# Patient Record
Sex: FEMALE | Race: BLACK OR AFRICAN AMERICAN | NOT HISPANIC OR LATINO | Employment: FULL TIME | URBAN - METROPOLITAN AREA
[De-identification: names, ages, dates, MRNs, and addresses within clinical notes are randomized per-mention and may not be internally consistent; named-entity substitution may affect disease eponyms.]

---

## 2017-04-28 ENCOUNTER — CONVERSION ENCOUNTER (OUTPATIENT)
Dept: MAMMOGRAPHY | Facility: CLINIC | Age: 47
End: 2017-04-28

## 2017-05-14 ENCOUNTER — OFFICE VISIT (OUTPATIENT)
Dept: URGENT CARE | Facility: MEDICAL CENTER | Age: 47
End: 2017-05-14
Payer: COMMERCIAL

## 2017-05-14 PROCEDURE — 99283 EMERGENCY DEPT VISIT LOW MDM: CPT

## 2017-05-14 PROCEDURE — G0382 LEV 3 HOSP TYPE B ED VISIT: HCPCS

## 2017-08-05 ENCOUNTER — HOSPITAL ENCOUNTER (EMERGENCY)
Facility: HOSPITAL | Age: 47
Discharge: HOME | End: 2017-08-06
Attending: EMERGENCY MEDICINE
Payer: COMMERCIAL

## 2017-08-05 VITALS
HEART RATE: 49 BPM | TEMPERATURE: 97.6 F | OXYGEN SATURATION: 100 % | SYSTOLIC BLOOD PRESSURE: 91 MMHG | DIASTOLIC BLOOD PRESSURE: 57 MMHG | RESPIRATION RATE: 16 BRPM | WEIGHT: 147.49 LBS

## 2017-08-05 DIAGNOSIS — S52.90XA RADIUS FRACTURE: Primary | ICD-10-CM

## 2017-08-05 RX ORDER — IBUPROFEN 400 MG/1
400 TABLET ORAL ONCE
Status: COMPLETED | OUTPATIENT
Start: 2017-08-06 | End: 2017-08-06

## 2017-08-05 RX ORDER — ONDANSETRON 2 MG/ML
INJECTION INTRAMUSCULAR; INTRAVENOUS
Status: COMPLETED
Start: 2017-08-05 | End: 2017-08-06

## 2017-08-06 ENCOUNTER — APPOINTMENT (EMERGENCY)
Dept: RADIOLOGY | Facility: HOSPITAL | Age: 47
End: 2017-08-06
Payer: COMMERCIAL

## 2017-08-06 PROCEDURE — 73110 X-RAY EXAM OF WRIST: CPT

## 2017-08-06 PROCEDURE — 96372 THER/PROPH/DIAG INJ SC/IM: CPT

## 2017-08-06 PROCEDURE — 99284 EMERGENCY DEPT VISIT MOD MDM: CPT

## 2017-08-06 RX ORDER — OXYCODONE HYDROCHLORIDE AND ACETAMINOPHEN 5; 325 MG/1; MG/1
1 TABLET ORAL EVERY 6 HOURS PRN
Qty: 12 TABLET | Refills: 0 | Status: SHIPPED | OUTPATIENT
Start: 2017-08-06 | End: 2017-08-16

## 2017-08-06 RX ORDER — IBUPROFEN 600 MG/1
600 TABLET ORAL EVERY 6 HOURS PRN
Qty: 30 TABLET | Refills: 0 | Status: SHIPPED | OUTPATIENT
Start: 2017-08-06 | End: 2019-01-30

## 2017-08-06 RX ADMIN — HYDROMORPHONE HYDROCHLORIDE 1 MG: 1 INJECTION, SOLUTION INTRAMUSCULAR; INTRAVENOUS; SUBCUTANEOUS at 00:32

## 2017-08-06 RX ADMIN — IBUPROFEN 400 MG: 400 TABLET ORAL at 00:15

## 2018-03-25 ENCOUNTER — OFFICE VISIT (OUTPATIENT)
Dept: URGENT CARE | Facility: MEDICAL CENTER | Age: 48
End: 2018-03-25
Payer: COMMERCIAL

## 2018-03-25 VITALS
OXYGEN SATURATION: 100 % | DIASTOLIC BLOOD PRESSURE: 70 MMHG | SYSTOLIC BLOOD PRESSURE: 130 MMHG | TEMPERATURE: 98.5 F | HEART RATE: 48 BPM | RESPIRATION RATE: 16 BRPM

## 2018-03-25 DIAGNOSIS — J30.1 ACUTE SEASONAL ALLERGIC RHINITIS DUE TO POLLEN: Primary | ICD-10-CM

## 2018-03-25 PROCEDURE — 99282 EMERGENCY DEPT VISIT SF MDM: CPT | Performed by: PHYSICIAN ASSISTANT

## 2018-03-25 PROCEDURE — G0381 LEV 2 HOSP TYPE B ED VISIT: HCPCS | Performed by: PHYSICIAN ASSISTANT

## 2018-03-25 RX ORDER — PREDNISONE 10 MG/1
TABLET ORAL
Qty: 18 TABLET | Refills: 0 | Status: SHIPPED | OUTPATIENT
Start: 2018-03-25 | End: 2019-01-30 | Stop reason: ALTCHOICE

## 2018-03-25 NOTE — PATIENT INSTRUCTIONS
Allergic Rhinitis   WHAT YOU NEED TO KNOW:   Allergic rhinitis, or hay fever, is swelling of the inside of your nose  The swelling is a reaction to allergens in the air  An allergen can be anything that causes an allergic reaction  Allergies to weeds, grass, trees, or mold often cause seasonal allergic rhinitis  Indoor dust mites, cockroaches, pet dander, or mold can also cause allergic rhinitis  DISCHARGE INSTRUCTIONS:   Call 911 for the following:   · You have chest pain or shortness of breath  Return to the emergency department if:   · You have severe pain  · You cough up blood  Contact your healthcare provider if:   · You have a fever  · You have ear or sinus pain, or a headache  · Your symptoms get worse, even after treatment  · You have yellow, green, brown, or bloody mucus coming from your nose  · Your nose is bleeding or you have pain inside your nose  · You have trouble sleeping because of your symptoms  · You have questions or concerns about your condition or care  Medicines:   · Medicines  help decrease your symptoms and clear your stuffy nose  · Take your medicine as directed  Contact your healthcare provider if you think your medicine is not helping or if you have side effects  Tell him of her if you are allergic to any medicine  Keep a list of the medicines, vitamins, and herbs you take  Include the amounts, and when and why you take them  Bring the list or the pill bottles to follow-up visits  Carry your medicine list with you in case of an emergency  How to manage allergic rhinitis:  The best way to manage allergic rhinitis is to avoid allergens that can trigger your symptoms  Any of the following may help decrease your symptoms:  · Rinse your nose and sinuses  with a salt water solution or use a salt water nasal spray  This will help thin the mucus in your nose and rinse away pollen and dirt  It will also help reduce swelling so you can breathe normally  Ask your healthcare provider how often to rinse your nose  · Reduce exposure to dust mites  Wash sheets and towels in hot water every week  Cover your pillows and mattresses with allergen-free covers  Limit the number of stuffed animals and soft toys your child has  Wash your child's toys in hot water regularly  Vacuum weekly and use a vacuum  with an air filter  If possible, get rid of carpets and curtains  These collect dust and dust mites  · Reduce exposure to pollen  Keep windows and doors closed in your house and car  Stay inside when air pollution or the pollen count is high  Run your air conditioner on recycle, and change air filters often  Shower and wash your hair before bed every night to rinse away pollen  · Reduce exposure to pet dander  If possible, do not keep cats, dogs, birds, or other pets  If you do keep pets in your home, keep them out of bedrooms and carpeted rooms  Bathe them often  · Reduce exposure to mold  Do not spend time in basements  Choose artificial plants instead of live plants  Keep your home's humidity at less than 45%  Do not have ponds or standing water in your home or yard  · Do not smoke  Avoid others who smoke  Ask your healthcare provider for information if you currently smoke and need help to quit  Follow up with your healthcare provider as directed: You may need to see an allergist often to control your symptoms  Write down your questions so you remember to ask them during your visits  © 2017 [employer] [employer] Information is for End User's use only and may not be sold, redistributed or otherwise used for commercial purposes  All illustrations and images included in CareNotes® are the copyrighted property of [employer]  or Luis F Navarro  The above information is an  only  It is not intended as medical advice for individual conditions or treatments   Talk to your doctor, nurse or pharmacist before following any medical regimen to see if it is safe and effective for you

## 2018-03-25 NOTE — PROGRESS NOTES
[facility]        NAME: Rsoe Toussaint is a 52 y o  female  : 1970    MRN: [id number]  DATE: 2018  TIME: 5:37 PM    Assessment and Plan   Acute seasonal allergic rhinitis due to pollen [J30 1]  1  Acute seasonal allergic rhinitis due to pollen  predniSONE 10 mg tablet         Patient Instructions       Follow-up if symptoms increase or no better in 5 days with PCP  Claritin or Allegra daily  Chief Complaint     Chief Complaint   Patient presents with    Headache     Pt with complaints of runny nose, phlegm in throat for months  Past 3 weeks has headache across forehead and behind eyes that pt states never totally subsides despite taking ibuprofen  Pt denies light sensitivity or nausea or vomiting   Nasal Congestion         History of Present Illness       Sinusitis   This is a new problem  The current episode started 1 to 4 weeks ago  The problem has been gradually worsening since onset  There has been no fever  Her pain is at a severity of 8/10  Associated symptoms include congestion, coughing, headaches, sinus pressure and a sore throat  Pertinent negatives include no chills, diaphoresis, ear pain, hoarse voice, neck pain, shortness of breath, sneezing or swollen glands  Past treatments include nothing  Patient with history of increasing sinus pressure  Has no fever or chills, but continuing difficulties  Symptoms increase when she lays down  She has a headache that is frontal in nature and behind her eyes and is pressing and pressure type  Review of Systems   Review of Systems   Constitutional: Negative for chills and diaphoresis  HENT: Positive for congestion, sinus pressure and sore throat  Negative for ear pain, hoarse voice and sneezing  Respiratory: Positive for cough  Negative for shortness of breath  Musculoskeletal: Negative for neck pain  Neurological: Positive for headaches  All other systems reviewed and are negative          Current Medications Current Outpatient Prescriptions:     ibuprofen (MOTRIN) 600 mg tablet, Take 1 tablet by mouth every 6 (six) hours as needed for mild pain or moderate pain, Disp: 30 tablet, Rfl: 0    predniSONE 10 mg tablet, 4 x 3 days, 3 x 1, 2×1, 1×1, Disp: 18 tablet, Rfl: 0    Current Allergies     Allergies as of 03/25/2018    (No Known Allergies)            The following portions of the patient's history were reviewed and updated as appropriate: allergies, current medications, past family history, past medical history, past social history, past surgical history and problem list      Past Medical History:   Diagnosis Date    Bradycardia     Hypotension        No past surgical history on file  No family history on file  Medications have been verified  Objective   /70 (BP Location: Right arm, Patient Position: Sitting, Cuff Size: Standard)   Pulse (!) 48   Temp 98 5 °F (36 9 °C) (Tympanic)   Resp 16   SpO2 100%        Physical Exam     Physical Exam   Constitutional: She appears well-developed and well-nourished  HENT:   Head: Normocephalic and atraumatic  Right Ear: External ear normal    Left Ear: External ear normal    Mouth/Throat: Oropharynx is clear and moist  No oropharyngeal exudate  Eyes: Conjunctivae are normal    Cardiovascular: Normal rate, regular rhythm and normal heart sounds  Pulmonary/Chest: Effort normal and breath sounds normal    Nasal mucosa boggy with cobblestone appearance  Negative maxillary tenderness And good transillumination  Tms slightly bulging bilaterally

## 2018-07-16 ENCOUNTER — TRANSCRIBE ORDERS (OUTPATIENT)
Dept: ADMINISTRATIVE | Facility: HOSPITAL | Age: 48
End: 2018-07-16

## 2018-07-16 DIAGNOSIS — F33.2 SEVERE RECURRENT MAJOR DEPRESSION WITHOUT PSYCHOTIC FEATURES (HCC): ICD-10-CM

## 2018-07-16 DIAGNOSIS — F31.70 BIPOLAR AFFECTIVE DISORDER IN REMISSION (HCC): ICD-10-CM

## 2018-07-17 ENCOUNTER — APPOINTMENT (OUTPATIENT)
Dept: LAB | Facility: HOSPITAL | Age: 48
End: 2018-07-17
Payer: COMMERCIAL

## 2018-07-17 DIAGNOSIS — F31.70 BIPOLAR AFFECTIVE DISORDER IN REMISSION (HCC): ICD-10-CM

## 2018-07-17 DIAGNOSIS — F33.2 SEVERE RECURRENT MAJOR DEPRESSION WITHOUT PSYCHOTIC FEATURES (HCC): ICD-10-CM

## 2018-07-17 DIAGNOSIS — F20.0 PARANOID SCHIZOPHRENIA, SUBCHRONIC CONDITION (HCC): ICD-10-CM

## 2018-07-17 DIAGNOSIS — F25.9 SCHIZOAFFECTIVE DISORDER, UNSPECIFIED TYPE (HCC): ICD-10-CM

## 2018-07-17 LAB
ALBUMIN SERPL BCP-MCNC: 3.6 G/DL (ref 3–5.2)
ALP SERPL-CCNC: 61 U/L (ref 43–122)
ALT SERPL W P-5'-P-CCNC: 22 U/L (ref 9–52)
ANION GAP SERPL CALCULATED.3IONS-SCNC: 7 MMOL/L (ref 5–14)
AST SERPL W P-5'-P-CCNC: 20 U/L (ref 14–36)
BASOPHILS # BLD AUTO: 0 THOUSANDS/ΜL (ref 0–0.1)
BASOPHILS NFR BLD AUTO: 1 % (ref 0–1)
BILIRUB SERPL-MCNC: 0.2 MG/DL
BUN SERPL-MCNC: 16 MG/DL (ref 5–25)
CALCIUM SERPL-MCNC: 8.6 MG/DL (ref 8.4–10.2)
CHLORIDE SERPL-SCNC: 105 MMOL/L (ref 97–108)
CHOLEST SERPL-MCNC: 202 MG/DL
CO2 SERPL-SCNC: 27 MMOL/L (ref 22–30)
CREAT SERPL-MCNC: 0.7 MG/DL (ref 0.6–1.2)
EOSINOPHIL # BLD AUTO: 0.1 THOUSAND/ΜL (ref 0–0.4)
EOSINOPHIL NFR BLD AUTO: 3 % (ref 0–6)
ERYTHROCYTE [DISTWIDTH] IN BLOOD BY AUTOMATED COUNT: 12.3 %
GFR SERPL CREATININE-BSD FRML MDRD: 119 ML/MIN/1.73SQ M
GLUCOSE P FAST SERPL-MCNC: 87 MG/DL (ref 70–99)
HCT VFR BLD AUTO: 36.7 % (ref 36–46)
HDLC SERPL-MCNC: 57 MG/DL (ref 40–59)
HGB BLD-MCNC: 12 G/DL (ref 12–16)
LDLC SERPL CALC-MCNC: 129 MG/DL
LYMPHOCYTES # BLD AUTO: 1.6 THOUSANDS/ΜL (ref 0.5–4)
LYMPHOCYTES NFR BLD AUTO: 41 % (ref 20–50)
MCH RBC QN AUTO: 30.2 PG (ref 26–34)
MCHC RBC AUTO-ENTMCNC: 32.7 G/DL (ref 31–36)
MCV RBC AUTO: 92 FL (ref 80–100)
MONOCYTES # BLD AUTO: 0.4 THOUSAND/ΜL (ref 0.2–0.9)
MONOCYTES NFR BLD AUTO: 10 % (ref 1–10)
NEUTROPHILS # BLD AUTO: 1.7 THOUSANDS/ΜL (ref 1.8–7.8)
NEUTS SEG NFR BLD AUTO: 45 % (ref 45–65)
NONHDLC SERPL-MCNC: 145 MG/DL
PLATELET # BLD AUTO: 168 THOUSANDS/UL (ref 150–450)
PMV BLD AUTO: 8.9 FL (ref 8.9–12.7)
POTASSIUM SERPL-SCNC: 4.2 MMOL/L (ref 3.6–5)
PROT SERPL-MCNC: 7.1 G/DL (ref 5.9–8.4)
RBC # BLD AUTO: 3.98 MILLION/UL (ref 4–5.2)
SODIUM SERPL-SCNC: 139 MMOL/L (ref 137–147)
TRIGL SERPL-MCNC: 78 MG/DL
TSH SERPL DL<=0.05 MIU/L-ACNC: 2.36 UIU/ML (ref 0.47–4.68)
WBC # BLD AUTO: 3.8 THOUSAND/UL (ref 4.5–11)

## 2018-07-17 PROCEDURE — 84443 ASSAY THYROID STIM HORMONE: CPT

## 2018-07-17 PROCEDURE — 80053 COMPREHEN METABOLIC PANEL: CPT

## 2018-07-17 PROCEDURE — 80061 LIPID PANEL: CPT

## 2018-07-17 PROCEDURE — 36415 COLL VENOUS BLD VENIPUNCTURE: CPT

## 2018-07-17 PROCEDURE — 85025 COMPLETE CBC W/AUTO DIFF WBC: CPT

## 2018-12-26 ENCOUNTER — HOSPITAL ENCOUNTER (EMERGENCY)
Facility: HOSPITAL | Age: 48
Discharge: HOME | End: 2018-12-26
Attending: EMERGENCY MEDICINE
Payer: COMMERCIAL

## 2018-12-26 ENCOUNTER — APPOINTMENT (EMERGENCY)
Dept: RADIOLOGY | Facility: HOSPITAL | Age: 48
End: 2018-12-26
Payer: COMMERCIAL

## 2018-12-26 VITALS
RESPIRATION RATE: 16 BRPM | TEMPERATURE: 98.3 F | OXYGEN SATURATION: 100 % | BODY MASS INDEX: 25.4 KG/M2 | DIASTOLIC BLOOD PRESSURE: 85 MMHG | HEART RATE: 85 BPM | SYSTOLIC BLOOD PRESSURE: 153 MMHG | WEIGHT: 138.89 LBS

## 2018-12-26 DIAGNOSIS — J18.9 PNEUMONIA: Primary | ICD-10-CM

## 2018-12-26 LAB
FLUAV + FLUBV RNA ISLT NAA+PROBE: NOT DETECTED
FLUAV + FLUBV RNA ISLT NAA+PROBE: NOT DETECTED

## 2018-12-26 PROCEDURE — 71046 X-RAY EXAM CHEST 2 VIEWS: CPT

## 2018-12-26 PROCEDURE — 87502 INFLUENZA DNA AMP PROBE: CPT | Performed by: PHYSICIAN ASSISTANT

## 2018-12-26 PROCEDURE — 99283 EMERGENCY DEPT VISIT LOW MDM: CPT

## 2018-12-26 RX ORDER — AZITHROMYCIN 250 MG/1
TABLET, FILM COATED ORAL
Qty: 6 TABLET | Refills: 0 | Status: SHIPPED | OUTPATIENT
Start: 2018-12-26 | End: 2018-12-26

## 2018-12-26 RX ORDER — AZITHROMYCIN 250 MG/1
TABLET, FILM COATED ORAL
Qty: 6 TABLET | Refills: 0 | Status: SHIPPED | OUTPATIENT
Start: 2018-12-26 | End: 2018-12-30

## 2018-12-26 RX ORDER — DEXTROMETHORPHAN HYDROBROMIDE AND PROMETHAZINE HYDROCHLORIDE 15; 6.25 MG/5ML; MG/5ML
5 SYRUP ORAL 4 TIMES DAILY PRN
Qty: 118 ML | Refills: 0 | Status: SHIPPED | OUTPATIENT
Start: 2018-12-26 | End: 2019-01-02

## 2018-12-26 NOTE — ED PROVIDER NOTES
History  Chief Complaint   Patient presents with    Fever - 9 weeks to 74 years     temp anywhere from 100 to 102  7  body aches, cough, sore throat  pt recently put on amoxicillin about 3 weeks ago which did help, but pt began to feel sick again     This is a 49-year-old female patient who states she has been sick for about 3 weeks she was seen in urgent care more than a week ago was put on amoxicillin she still having severe sinus congestion with green discharge from her nose and frontal pressure  Without headache or blurred vision she has an ongoing cough the last with a day and the night and as of 2 days ago developed 102 fever and some aches and pains with some chills  She finished the antibiotics as directed and did not improve at all  The beginning of this she was put on steroids in nasal spray without improvement  No nausea vomiting or diarrhea no chest pain or shortness of breath no urinary symptoms  She is eating and drinking but just feels very washout she called her family doctor which she cannot see her till January  She is still not taking any medications  Differential diagnosis includes not limited to sinusitis due to the greenish nasal discharge and phlegm terminates upon exam this is a not improved after amoxicillin patient might be given Augmentin, influenza, pneumonia            Prior to Admission Medications   Prescriptions Last Dose Informant Patient Reported?  Taking?   ibuprofen (MOTRIN) 600 mg tablet Past Week at Unknown time  No Yes   Sig: Take 1 tablet by mouth every 6 (six) hours as needed for mild pain or moderate pain   predniSONE 10 mg tablet Not Taking at Unknown time  No No   Sig: 4 x 3 days, 3 x 1, 2×1, 1×1   Patient not taking: Reported on 12/26/2018       Facility-Administered Medications: None       Past Medical History:   Diagnosis Date    Bradycardia     Depression     Hypotension        Past Surgical History:   Procedure Laterality Date    HEMORROIDECTOMY      WRIST SURGERY Right        Family History   Problem Relation Age of Onset    Hypertension Maternal Grandmother     Diabetes Paternal Grandmother      I have reviewed and agree with the history as documented  Social History   Substance Use Topics    Smoking status: Never Smoker    Smokeless tobacco: Never Used    Alcohol use 0 6 oz/week     1 Glasses of wine per week      Comment: social        Review of Systems   All other systems reviewed and are negative  Physical Exam  Physical Exam   Constitutional: She appears well-developed and well-nourished  HENT:   Head: Normocephalic and atraumatic  Right Ear: External ear normal    Left Ear: External ear normal    Nose: Mucosal edema and rhinorrhea present  Right sinus exhibits maxillary sinus tenderness  Left sinus exhibits maxillary sinus tenderness  Mouth/Throat: Oropharynx is clear and moist    Patient with greenish-yellow nasal discharge  Positive postnasal drip   Eyes: Pupils are equal, round, and reactive to light  Conjunctivae are normal    Neck: Normal range of motion  Neck supple  Cardiovascular: Normal rate and regular rhythm  Pulmonary/Chest: Effort normal and breath sounds normal    Abdominal: Soft  Bowel sounds are normal  There is no tenderness  Musculoskeletal: Normal range of motion  She exhibits no edema or tenderness  Lymphadenopathy:     She has no cervical adenopathy  Neurological: She is alert  Skin: Skin is warm  Psychiatric: She has a normal mood and affect  Her behavior is normal    Nursing note and vitals reviewed        Vital Signs  ED Triage Vitals [12/26/18 1233]   Temperature Pulse Respirations Blood Pressure SpO2   98 3 °F (36 8 °C) 85 16 153/85 100 %      Temp Source Heart Rate Source Patient Position - Orthostatic VS BP Location FiO2 (%)   Tympanic Monitor Sitting Left arm --      Pain Score       6           Vitals:    12/26/18 1233   BP: 153/85   Pulse: 85   Patient Position - Orthostatic VS: Sitting Visual Acuity      ED Medications  Medications - No data to display    Diagnostic Studies  Results Reviewed     Procedure Component Value Units Date/Time    Rapid Flu-Viral RNA amplification [facility] HEART ONLY) [[id number]]  (Normal) Collected:  12/26/18 1251    Lab Status:  Final result Specimen:  Nares from Nose Updated:  12/26/18 1315     INFLUENZA A AMPLIFIED RNA Not Detected     INFLUENZA B AMPLIFIED Not Detected                 XR chest 2 views   ED Interpretation by Juan Seals PA-C (12/26 3429)   Patient will consolidation right upper lobe possibly consistent with mycoplasma pneumonia                 Procedures  Procedures       Phone Contacts  ED Phone Contact    ED Course                               MDM  CritCare Time    Disposition  Final diagnoses:   Pneumonia     Time reflects when diagnosis was documented in both MDM as applicable and the Disposition within this note     Time User Action Codes Description Comment    12/26/2018  1:19 PM [address] [J18 9] Pneumonia       ED Disposition     ED Disposition Condition Comment    Discharge  [place] discharge to home/self care      Condition at discharge: Good        Follow-up Information     Follow up With Specialties Details Why Contact Info    Bren Jovel MD Family Medicine Schedule an appointment as soon as possible for a visit  [address] [place] [address]            Patient's Medications   Discharge Prescriptions    AZITHROMYCIN (ZITHROMAX) 250 MG TABLET    Take 2 tablets today then 1 tablet daily x 4 days       Start Date: 12/26/2018End Date: 12/30/2018       Order Dose: --       Quantity: 6 tablet    Refills: 0    PROMETHAZINE-DEXTROMETHORPHAN (PHENERGAN-DM) 6 25-15 MG/5 ML ORAL SYRUP    Take 5 mL by mouth 4 (four) times a day as needed for cough for up to 7 days       Start Date: 12/26/2018End Date: 1/2/2019       Order Dose: 5 mL       Quantity: 118 mL    Refills: 0    SODIUM CHLORIDE (OCEAN) 0 65 % NASAL SPRAY    1 spray into each nostril as needed for congestion (as needed for congestion)       Start Date: 12/26/2018End Date: 12/26/2019       Order Dose: 1 spray       Quantity: 15 mL    Refills: 0     No discharge procedures on file      ED Provider  Electronically Signed by           Bernadette Hart PA-C  12/26/18 6226

## 2018-12-26 NOTE — DISCHARGE INSTRUCTIONS
Community Acquired Pneumonia   WHAT YOU NEED TO KNOW:   Community-acquired pneumonia (CAP) is a lung infection that you get outside of a hospital or nursing home setting  Your lungs become inflamed and cannot work well  CAP may be caused by bacteria, viruses, or fungi  DISCHARGE INSTRUCTIONS:   Return to the emergency department if:   · You are confused and cannot think clearly  · You have increased trouble breathing  · Your lips or fingernails turn gray or blue  Contact your healthcare provider if:   · Your symptoms do not get better, or they get worse  · You are urinating less, or not at all  · You have questions or concerns about your condition or care  Medicines:   · Medicines  may be given to treat a bacterial, viral, or fungal infection  You may also be given medicines to dilate your bronchial tubes to help you breathe more easily  · Take your medicine as directed  Contact your healthcare provider if you think your medicine is not helping or if you have side effects  Tell him or her if you are allergic to any medicine  Keep a list of the medicines, vitamins, and herbs you take  Include the amounts, and when and why you take them  Bring the list or the pill bottles to follow-up visits  Carry your medicine list with you in case of an emergency  Follow up with your healthcare provider within 3 days or as directed: You may need another x-ray  Write down your questions so you remember to ask them during your visits  Deep breathing and coughing:  Deep breathing helps open the air passages in your lungs  Coughing helps bring up mucus from your lungs  Take a deep breath and hold the breath as long as you can  Then push the air out of your lungs with a deep, strong cough  Spit out any mucus you have coughed up  Take 10 deep breaths in a row every hour that you are awake  Remember to follow each deep breath with a cough     Do not smoke or allow others to smoke around you:  Nicotine and other chemicals in cigarettes and cigars can cause lung damage  Ask your healthcare provider for information if you currently smoke and need help to quit  E-cigarettes or smokeless tobacco still contain nicotine  Talk to your healthcare provider before you use these products  Manage CAP at home:   · Breathe warm, moist air  This helps loosen mucus  Loosely place a warm, wet washcloth over your nose and mouth  A room humidifier may also help make the air moist     · Drink liquids as directed  Ask your healthcare provider how much liquid to drink each day and which liquids to drink  Liquids help make mucus thin and easier to get out of your body  · Gently tap your chest   This helps loosen mucus so it is easier to cough  Lie with your head lower than your chest several times a day and tap your chest      · Get plenty of rest   Rest helps your body heal   Prevent CAP:   · Wash your hands often with soap and water  Carry germ-killing hand gel with you  You can use the gel to clean your hands when soap and water are not available  Do not touch your eyes, nose, or mouth unless you have washed your hands first      · Clean surfaces often  Clean doorknobs, countertops, cell phones, and other surfaces that are touched often  · Always cover your mouth when you cough  Cough into a tissue or your shirtsleeve so you do not spread germs from your hands  · Try to avoid people who have a cold or the flu  If you are sick, stay away from others as much as possible  · Ask about vaccines  You may need a vaccine to help prevent pneumonia  Get an influenza (flu) vaccine every year as soon as it becomes available  © 2017 [employer] Information is for End User's use only and may not be sold, redistributed or otherwise used for commercial purposes  All illustrations and images included in CareNotes® are the copyrighted property of [employer]  or [employer] F [employer]    The above information is an  only  It is not intended as medical advice for individual conditions or treatments  Talk to your doctor, nurse or pharmacist before following any medical regimen to see if it is safe and effective for you

## 2019-01-30 ENCOUNTER — OFFICE VISIT (OUTPATIENT)
Dept: FAMILY MEDICINE CLINIC | Facility: CLINIC | Age: 49
End: 2019-01-30
Payer: COMMERCIAL

## 2019-01-30 VITALS
TEMPERATURE: 97.3 F | HEART RATE: 60 BPM | SYSTOLIC BLOOD PRESSURE: 120 MMHG | OXYGEN SATURATION: 100 % | DIASTOLIC BLOOD PRESSURE: 72 MMHG | WEIGHT: 144 LBS | RESPIRATION RATE: 20 BRPM | HEIGHT: 61 IN | BODY MASS INDEX: 27.19 KG/M2

## 2019-01-30 DIAGNOSIS — T78.40XA ALLERGIC STATE, INITIAL ENCOUNTER: ICD-10-CM

## 2019-01-30 DIAGNOSIS — D72.819 LEUKOPENIA, UNSPECIFIED TYPE: ICD-10-CM

## 2019-01-30 DIAGNOSIS — J18.9 PNEUMONIA OF RIGHT UPPER LOBE DUE TO INFECTIOUS ORGANISM: Primary | ICD-10-CM

## 2019-01-30 DIAGNOSIS — Z12.31 SCREENING MAMMOGRAM, ENCOUNTER FOR: ICD-10-CM

## 2019-01-30 DIAGNOSIS — E78.00 PURE HYPERCHOLESTEROLEMIA: ICD-10-CM

## 2019-01-30 DIAGNOSIS — I10 HYPERTENSION, UNSPECIFIED TYPE: ICD-10-CM

## 2019-01-30 PROCEDURE — 99214 OFFICE O/P EST MOD 30 MIN: CPT | Performed by: FAMILY MEDICINE

## 2019-01-30 PROCEDURE — 3008F BODY MASS INDEX DOCD: CPT | Performed by: FAMILY MEDICINE

## 2019-01-30 RX ORDER — FLUTICASONE PROPIONATE 50 MCG
2 SPRAY, SUSPENSION (ML) NASAL DAILY
COMMUNITY
End: 2020-08-04

## 2019-01-30 RX ORDER — LORATADINE 10 MG/1
10 TABLET ORAL DAILY
Qty: 30 TABLET | Refills: 0 | Status: SHIPPED | OUTPATIENT
Start: 2019-01-30 | End: 2020-08-04

## 2019-01-30 NOTE — PROGRESS NOTES
Assessment/Plan:          Diagnoses and all orders for this visit:    Pneumonia of right upper lobe due to infectious organism ([facility] [address] )  -     XR chest pa & lateral; Future    Hypertension, unspecified type  Comments:  Most likely benign hypertension  Today is well controlled  Continue monitor blood pressure at home, to follow with low-salt diet  Orders:  -     ECG 12 lead; Future  -     UA w Reflex to Microscopic w Reflex to Culture  -     CBC and differential; Future  -     Comprehensive metabolic panel; Future    Leukopenia, unspecified type  -     CBC and differential; Future    Allergic state, initial encounter  Comments:  Patient to start the Flonase she has at home  And take a Claritin  With plan to stop Claritin with next office visit if her allergy is under control  Orders:  -     loratadine (CLARITIN) 10 mg tablet; Take 1 tablet (10 mg total) by mouth daily Take 1 tablet daily p o  Pure hypercholesterolemia  Comments: To follow with low-fat diet  Orders:  -     Lipid Panel with Direct LDL reflex; Future  -     TSH, 3rd generation with Free T4 reflex; Future    Screening mammogram, encounter for  Comments:  Recommend depressed gyn exam  Orders:  -     Mammo screening bilateral w cad; Future    Other orders  -     fluticasone (FLONASE) 50 mcg/act nasal spray; 2 sprays into each nostril daily            Subjective:     Patient ID: Pat Mondragon is a 50 y o  female      Follow-up post ER  Patient stated in October she developed cold symptoms for 2 weeks  It progressed to sinus infection  [facility]   Was placed on amoxicillin  Patient was given Flonase but she did not use it Did not feel better, she developed cough will want to the emergency room she had chest x-ray and showed pneumonia  Was placed  On a Zithromax  Patient has no further cough or  Fever  Patient admitted to chronic nasal congestion and postnasal drip  Clear  She said she has had for a long while    D admit to tickle in the throat with postnasal drip     Nasal congestion moderate  Could constant  Both nostril  Admit to mild frontal headache sometimes with her nasal congestion  Elevated blood pressure, patient stated when she was at [facility] she told her blood pressure was high  She start monitoring her blood pressure  Her blood pressure run at home in the last 2 weeks between 120 to 152 over 70 to 81,    ER report on December 26, 2018 noted  Had chest x-ray showed right upper lobe , x-ray report noted  Patient had labs on July 17, 2018 also reviewed and discussed with patient        Review of Systems   Constitutional: Negative for activity change, appetite change, chills, fatigue, fever and unexpected weight change  HENT: Positive for postnasal drip and sinus pressure  Negative for ear discharge, ear pain, sore throat, trouble swallowing and voice change  Eyes: Negative for visual disturbance  Respiratory: Negative for cough, chest tightness, shortness of breath and wheezing  Cardiovascular: Negative for chest pain, palpitations and leg swelling  Gastrointestinal: Negative for abdominal pain, blood in stool, constipation, diarrhea, nausea and vomiting  Genitourinary: Negative for dysuria, frequency, hematuria and urgency  Musculoskeletal: Negative for arthralgias, back pain, gait problem, joint swelling, myalgias and neck pain  Skin: Negative for rash  Neurological: Negative for dizziness, tremors, seizures, syncope, weakness and light-headedness  Hematological: Negative for adenopathy  Does not bruise/bleed easily  Psychiatric/Behavioral: Negative for behavioral problems, confusion, dysphoric mood and sleep disturbance  Objective:     Physical Exam   Constitutional: She is oriented to person, place, and time  She appears well-developed and well-nourished  No distress  HENT:   Head: Normocephalic and atraumatic     Right Ear: External ear normal    Left Ear: External ear normal  Mouth/Throat: Oropharynx is clear and moist  No oropharyngeal exudate  Nose  The pale mucosa very enlarged turbinates bilaterally  No discharge seen  Tympanic membrane are normal bilaterally   Eyes: Pupils are equal, round, and reactive to light  Conjunctivae and EOM are normal  No scleral icterus  Neck: Normal range of motion  Neck supple  No JVD present  No thyromegaly present  Cardiovascular: Normal rate, regular rhythm, normal heart sounds and intact distal pulses  No murmur heard  Extremities  No edema   Pulmonary/Chest: Effort normal and breath sounds normal    Abdominal: Soft  She exhibits no mass  There is no tenderness  There is no rebound and no guarding  No hernia  Lymphadenopathy:     She has no cervical adenopathy  Neurological: She is alert and oriented to person, place, and time  No cranial nerve deficit  She exhibits normal muscle tone  Coordination normal    Skin: No rash noted  Psychiatric: She has a normal mood and affect   Her behavior is normal  Judgment and thought content normal

## 2019-02-01 ENCOUNTER — APPOINTMENT (OUTPATIENT)
Dept: LAB | Facility: HOSPITAL | Age: 49
End: 2019-02-01
Payer: COMMERCIAL

## 2019-02-01 DIAGNOSIS — E78.00 PURE HYPERCHOLESTEROLEMIA: ICD-10-CM

## 2019-02-01 DIAGNOSIS — D72.819 LEUKOPENIA, UNSPECIFIED TYPE: ICD-10-CM

## 2019-02-01 DIAGNOSIS — I10 HYPERTENSION, UNSPECIFIED TYPE: ICD-10-CM

## 2019-02-01 LAB
ALBUMIN SERPL BCP-MCNC: 3.9 G/DL (ref 3–5.2)
ALP SERPL-CCNC: 63 U/L (ref 43–122)
ALT SERPL W P-5'-P-CCNC: 18 U/L (ref 9–52)
ANION GAP SERPL CALCULATED.3IONS-SCNC: 7 MMOL/L (ref 5–14)
AST SERPL W P-5'-P-CCNC: 25 U/L (ref 14–36)
BASOPHILS # BLD AUTO: 0 THOUSANDS/ΜL (ref 0–0.1)
BASOPHILS NFR BLD AUTO: 1 % (ref 0–1)
BILIRUB SERPL-MCNC: 0.2 MG/DL
BILIRUB UR QL STRIP: NEGATIVE
BUN SERPL-MCNC: 10 MG/DL (ref 5–25)
CALCIUM SERPL-MCNC: 9 MG/DL (ref 8.4–10.2)
CHLORIDE SERPL-SCNC: 107 MMOL/L (ref 97–108)
CHOLEST SERPL-MCNC: 218 MG/DL
CLARITY UR: CLEAR
CO2 SERPL-SCNC: 25 MMOL/L (ref 22–30)
COLOR UR: NORMAL
CREAT SERPL-MCNC: 0.6 MG/DL (ref 0.6–1.2)
EOSINOPHIL # BLD AUTO: 0.1 THOUSAND/ΜL (ref 0–0.4)
EOSINOPHIL NFR BLD AUTO: 4 % (ref 0–6)
ERYTHROCYTE [DISTWIDTH] IN BLOOD BY AUTOMATED COUNT: 14.2 %
GFR SERPL CREATININE-BSD FRML MDRD: 125 ML/MIN/1.73SQ M
GLUCOSE P FAST SERPL-MCNC: 91 MG/DL (ref 70–99)
GLUCOSE UR STRIP-MCNC: NEGATIVE MG/DL
HCT VFR BLD AUTO: 32.5 % (ref 36–46)
HDLC SERPL-MCNC: 67 MG/DL (ref 40–59)
HGB BLD-MCNC: 10.7 G/DL (ref 12–16)
HGB UR QL STRIP.AUTO: NEGATIVE
KETONES UR STRIP-MCNC: NEGATIVE MG/DL
LDLC SERPL CALC-MCNC: 139 MG/DL
LEUKOCYTE ESTERASE UR QL STRIP: NEGATIVE
LYMPHOCYTES # BLD AUTO: 1.4 THOUSANDS/ΜL (ref 0.5–4)
LYMPHOCYTES NFR BLD AUTO: 44 % (ref 25–45)
MCH RBC QN AUTO: 29.4 PG (ref 26–34)
MCHC RBC AUTO-ENTMCNC: 32.9 G/DL (ref 31–36)
MCV RBC AUTO: 90 FL (ref 80–100)
MONOCYTES # BLD AUTO: 0.3 THOUSAND/ΜL (ref 0.2–0.9)
MONOCYTES NFR BLD AUTO: 8 % (ref 1–10)
NEUTROPHILS # BLD AUTO: 1.4 THOUSANDS/ΜL (ref 1.8–7.8)
NEUTS SEG NFR BLD AUTO: 44 % (ref 45–65)
NITRITE UR QL STRIP: NEGATIVE
PH UR STRIP.AUTO: 6.5 [PH] (ref 4.5–8)
PLATELET # BLD AUTO: 158 THOUSANDS/UL (ref 150–450)
PMV BLD AUTO: 8.8 FL (ref 8.9–12.7)
POTASSIUM SERPL-SCNC: 3.9 MMOL/L (ref 3.6–5)
PROT SERPL-MCNC: 7.3 G/DL (ref 5.9–8.4)
PROT UR STRIP-MCNC: NEGATIVE MG/DL
RBC # BLD AUTO: 3.63 MILLION/UL (ref 4–5.2)
SODIUM SERPL-SCNC: 139 MMOL/L (ref 137–147)
SP GR UR STRIP.AUTO: 1.01 (ref 1–1.04)
TRIGL SERPL-MCNC: 58 MG/DL
TSH SERPL DL<=0.05 MIU/L-ACNC: 1.39 UIU/ML (ref 0.47–4.68)
UROBILINOGEN UA: NEGATIVE MG/DL
WBC # BLD AUTO: 3.2 THOUSAND/UL (ref 4.5–11)

## 2019-02-01 PROCEDURE — 85025 COMPLETE CBC W/AUTO DIFF WBC: CPT

## 2019-02-01 PROCEDURE — 84443 ASSAY THYROID STIM HORMONE: CPT

## 2019-02-01 PROCEDURE — 81003 URINALYSIS AUTO W/O SCOPE: CPT | Performed by: FAMILY MEDICINE

## 2019-02-01 PROCEDURE — 80053 COMPREHEN METABOLIC PANEL: CPT

## 2019-02-01 PROCEDURE — 36415 COLL VENOUS BLD VENIPUNCTURE: CPT

## 2019-02-01 PROCEDURE — 80061 LIPID PANEL: CPT

## 2019-02-13 ENCOUNTER — OFFICE VISIT (OUTPATIENT)
Dept: FAMILY MEDICINE CLINIC | Facility: CLINIC | Age: 49
End: 2019-02-13
Payer: COMMERCIAL

## 2019-02-13 ENCOUNTER — TRANSCRIBE ORDERS (OUTPATIENT)
Dept: ADMINISTRATIVE | Facility: HOSPITAL | Age: 49
End: 2019-02-13

## 2019-02-13 ENCOUNTER — APPOINTMENT (OUTPATIENT)
Dept: LAB | Facility: HOSPITAL | Age: 49
End: 2019-02-13
Payer: COMMERCIAL

## 2019-02-13 ENCOUNTER — HOSPITAL ENCOUNTER (OUTPATIENT)
Dept: NON INVASIVE DIAGNOSTICS | Facility: HOSPITAL | Age: 49
Discharge: HOME | End: 2019-02-13
Payer: COMMERCIAL

## 2019-02-13 ENCOUNTER — HOSPITAL ENCOUNTER (OUTPATIENT)
Dept: RADIOLOGY | Facility: HOSPITAL | Age: 49
Discharge: HOME | End: 2019-02-13
Payer: COMMERCIAL

## 2019-02-13 VITALS
HEART RATE: 74 BPM | WEIGHT: 141.8 LBS | DIASTOLIC BLOOD PRESSURE: 74 MMHG | RESPIRATION RATE: 20 BRPM | BODY MASS INDEX: 27.05 KG/M2 | OXYGEN SATURATION: 100 % | SYSTOLIC BLOOD PRESSURE: 100 MMHG | TEMPERATURE: 97.3 F

## 2019-02-13 DIAGNOSIS — I10 ESSENTIAL HYPERTENSION: ICD-10-CM

## 2019-02-13 DIAGNOSIS — J18.9 PNEUMONIA OF RIGHT UPPER LOBE DUE TO INFECTIOUS ORGANISM: ICD-10-CM

## 2019-02-13 DIAGNOSIS — E78.00 PURE HYPERCHOLESTEROLEMIA: ICD-10-CM

## 2019-02-13 DIAGNOSIS — D72.819 LEUKOPENIA, UNSPECIFIED TYPE: ICD-10-CM

## 2019-02-13 DIAGNOSIS — D64.9 ANEMIA, UNSPECIFIED TYPE: ICD-10-CM

## 2019-02-13 DIAGNOSIS — I10 HYPERTENSION, UNSPECIFIED TYPE: ICD-10-CM

## 2019-02-13 DIAGNOSIS — T78.40XD ALLERGIC STATE, SUBSEQUENT ENCOUNTER: ICD-10-CM

## 2019-02-13 DIAGNOSIS — D72.819 LEUKOPENIA, UNSPECIFIED TYPE: Primary | ICD-10-CM

## 2019-02-13 LAB
ATRIAL RATE: 65 BPM
BASOPHILS # BLD AUTO: 0 THOUSANDS/ΜL (ref 0–0.1)
BASOPHILS NFR BLD AUTO: 1 % (ref 0–1)
EOSINOPHIL # BLD AUTO: 0.2 THOUSAND/ΜL (ref 0–0.4)
EOSINOPHIL NFR BLD AUTO: 5 % (ref 0–6)
ERYTHROCYTE [DISTWIDTH] IN BLOOD BY AUTOMATED COUNT: 14.2 %
ERYTHROCYTE [SEDIMENTATION RATE] IN BLOOD: 35 MM/HOUR (ref 1–20)
FERRITIN SERPL-MCNC: 15 NG/ML (ref 8–388)
FOLATE SERPL-MCNC: 19.6 NG/ML (ref 3.1–17.5)
HCT VFR BLD AUTO: 36.4 % (ref 36–46)
HGB BLD-MCNC: 11.7 G/DL (ref 12–16)
IRON SATN MFR SERPL: 12 %
IRON SERPL-MCNC: 43 UG/DL (ref 50–170)
LYMPHOCYTES # BLD AUTO: 1.6 THOUSANDS/ΜL (ref 0.5–4)
LYMPHOCYTES NFR BLD AUTO: 43 % (ref 25–45)
MCH RBC QN AUTO: 28.9 PG (ref 26–34)
MCHC RBC AUTO-ENTMCNC: 32 G/DL (ref 31–36)
MCV RBC AUTO: 90 FL (ref 80–100)
MONOCYTES # BLD AUTO: 0.3 THOUSAND/ΜL (ref 0.2–0.9)
MONOCYTES NFR BLD AUTO: 9 % (ref 1–10)
NEUTROPHILS # BLD AUTO: 1.5 THOUSANDS/ΜL (ref 1.8–7.8)
NEUTS SEG NFR BLD AUTO: 42 % (ref 45–65)
P AXIS: 69 DEGREES
PLATELET # BLD AUTO: 203 THOUSANDS/UL (ref 150–450)
PMV BLD AUTO: 8.3 FL (ref 8.9–12.7)
PR INTERVAL: 166 MS
QRS AXIS: 71 DEGREES
QRSD INTERVAL: 80 MS
QT INTERVAL: 416 MS
QTC INTERVAL: 432 MS
RBC # BLD AUTO: 4.04 MILLION/UL (ref 4–5.2)
RETICS # CALC: 1.23 % (ref 0.87–2.63)
T WAVE AXIS: 44 DEGREES
TIBC SERPL-MCNC: 350 UG/DL (ref 250–450)
VENTRICULAR RATE: 65 BPM
VIT B12 SERPL-MCNC: 1105 PG/ML (ref 100–900)
WBC # BLD AUTO: 3.6 THOUSAND/UL (ref 4.5–11)

## 2019-02-13 PROCEDURE — 86334 IMMUNOFIX E-PHORESIS SERUM: CPT

## 2019-02-13 PROCEDURE — 84165 PROTEIN E-PHORESIS SERUM: CPT | Performed by: PATHOLOGY

## 2019-02-13 PROCEDURE — 71046 X-RAY EXAM CHEST 2 VIEWS: CPT

## 2019-02-13 PROCEDURE — 86334 IMMUNOFIX E-PHORESIS SERUM: CPT | Performed by: PATHOLOGY

## 2019-02-13 PROCEDURE — 83550 IRON BINDING TEST: CPT

## 2019-02-13 PROCEDURE — 85045 AUTOMATED RETICULOCYTE COUNT: CPT

## 2019-02-13 PROCEDURE — 82746 ASSAY OF FOLIC ACID SERUM: CPT

## 2019-02-13 PROCEDURE — 82728 ASSAY OF FERRITIN: CPT

## 2019-02-13 PROCEDURE — 3074F SYST BP LT 130 MM HG: CPT | Performed by: FAMILY MEDICINE

## 2019-02-13 PROCEDURE — 86038 ANTINUCLEAR ANTIBODIES: CPT

## 2019-02-13 PROCEDURE — 84166 PROTEIN E-PHORESIS/URINE/CSF: CPT

## 2019-02-13 PROCEDURE — 93010 ELECTROCARDIOGRAM REPORT: CPT | Performed by: INTERNAL MEDICINE

## 2019-02-13 PROCEDURE — 82607 VITAMIN B-12: CPT

## 2019-02-13 PROCEDURE — 99214 OFFICE O/P EST MOD 30 MIN: CPT | Performed by: FAMILY MEDICINE

## 2019-02-13 PROCEDURE — 84165 PROTEIN E-PHORESIS SERUM: CPT

## 2019-02-13 PROCEDURE — 85025 COMPLETE CBC W/AUTO DIFF WBC: CPT

## 2019-02-13 PROCEDURE — 3078F DIAST BP <80 MM HG: CPT | Performed by: FAMILY MEDICINE

## 2019-02-13 PROCEDURE — 36415 COLL VENOUS BLD VENIPUNCTURE: CPT

## 2019-02-13 PROCEDURE — 85652 RBC SED RATE AUTOMATED: CPT

## 2019-02-13 PROCEDURE — 86039 ANTINUCLEAR ANTIBODIES (ANA): CPT

## 2019-02-13 PROCEDURE — 84166 PROTEIN E-PHORESIS/URINE/CSF: CPT | Performed by: PATHOLOGY

## 2019-02-13 PROCEDURE — 83540 ASSAY OF IRON: CPT

## 2019-02-13 PROCEDURE — 93005 ELECTROCARDIOGRAM TRACING: CPT

## 2019-02-13 RX ORDER — METHYLPREDNISOLONE 4 MG/1
TABLET ORAL
Qty: 21 EACH | Refills: 0 | Status: SHIPPED | OUTPATIENT
Start: 2019-02-13 | End: 2019-03-13 | Stop reason: ALTCHOICE

## 2019-02-13 NOTE — PROGRESS NOTES
Assessment/Plan:          Diagnoses and all orders for this visit:    Leukopenia, unspecified type  -     Folate; Future  -     Iron, TIBC and Ferritin Panel; Future  -     Iron Saturation %; Future  -     Vitamin B12; Future  -     Protein electrophoresis, urine; Future  -     Protein electrophoresis, serum; Future  -     GONZALO Screen w/ Reflex to Titer/Pattern; Future  -     CBC and differential; Future  -     Sedimentation rate, automated; Future    Anemia, unspecified type  -     Folate; Future  -     Iron, TIBC and Ferritin Panel; Future  -     Iron Saturation %; Future  -     Vitamin B12; Future  -     Reticulocytes; Future  -     Protein electrophoresis, urine; Future  -     Protein electrophoresis, serum; Future  -     GONZALO Screen w/ Reflex to Titer/Pattern; Future  -     Occult Blood, Fecal Immunochemical; Future  -     CBC and differential; Future  -     Sedimentation rate, automated; Future    Pneumonia of right upper lobe due to infectious organism [facility])  Comments:  Check chest x-ray    Allergic state, subsequent encounter  Comments:  Not controlled  To take Medrol with food  GI side effect discussed  Orders:  -     methylPREDNISolone 4 MG tablet therapy pack; Use as directed on package    Pure hypercholesterolemia  Comments: Follow with low-fat diet    Essential hypertension  Comments:  Check EKG  To follow with the dash diet            Subjective:     Patient ID: Lisandro Trinidad is a 50 y o  female      Patient is here for follow-up on her chronic medical problem     Pneumonia  Patient denied fever, cough  Chest pain or chills     Leukopenia  Denied weight loss  Or recurrent infection  Hypertension  Denied flushing, dizziness or headache  Allergy  Patient stated her symptoms not better with adding Claritin  She still have nasal congestion and poor clear postnasal drainage  Recent blood work showed patient had anemia  Patient admitted to history of anemia     Patient stated years ago when she goes to the blood bank to donate blood a lot of time was told her blood count is low and she cannot today blood     And in the past also she took iron supplement  Patient denied melena or rectal bleeding  Test results  Lab done 12 days ago  Discussed result with patient  EKG and chest x-ray not done  Review of Systems   Constitutional: Negative for activity change, appetite change, chills, fatigue, fever and unexpected weight change  HENT: Negative for congestion, ear pain, sinus pressure and sore throat  Eyes: Negative for visual disturbance  Respiratory: Negative for cough, chest tightness, shortness of breath and wheezing  Cardiovascular: Negative for chest pain, palpitations and leg swelling  Gastrointestinal: Negative for abdominal pain, blood in stool, constipation, diarrhea, nausea and vomiting  Endocrine: Negative for cold intolerance, heat intolerance, polyphagia and polyuria  Genitourinary: Negative for dysuria, frequency, hematuria and urgency  Musculoskeletal: Negative for arthralgias, back pain, gait problem, joint swelling, myalgias and neck pain  Skin: Negative for rash  Neurological: Negative for dizziness, tremors, seizures, syncope, weakness, light-headedness and headaches  Hematological: Negative for adenopathy  Does not bruise/bleed easily  Psychiatric/Behavioral: Negative for behavioral problems, confusion, dysphoric mood and sleep disturbance  Objective:     Physical Exam   Constitutional: She is oriented to person, place, and time  She appears well-developed and well-nourished  No distress  HENT:   Head: Normocephalic  Eyes: Pupils are equal, round, and reactive to light  Conjunctivae and EOM are normal  No scleral icterus  Neck: No JVD present  No thyromegaly present  Cardiovascular: Normal rate, regular rhythm and normal heart sounds  Pulses:       Carotid pulses are 3+ on the right side, and 3+ on the left side         Dorsalis pedis pulses are 3+ on the right side, and 3+ on the left side  Pulmonary/Chest: Effort normal and breath sounds normal  She has no wheezes  Abdominal: Soft  Bowel sounds are normal  She exhibits no mass  There is no tenderness  There is no rebound and no guarding  No hernia  Lymphadenopathy:     She has no cervical adenopathy  Neurological: She is alert and oriented to person, place, and time  No cranial nerve deficit  She exhibits normal muscle tone  Coordination normal    Skin: No rash noted  Psychiatric: She has a normal mood and affect  Her behavior is normal  Judgment and thought content normal    Nursing note reviewed

## 2019-02-15 LAB
ALBUMIN SERPL ELPH-MCNC: 4.37 G/DL (ref 3.5–5)
ALBUMIN SERPL ELPH-MCNC: 56.7 % (ref 52–65)
ALBUMIN UR ELPH-MCNC: 100 %
ALPHA1 GLOB MFR UR ELPH: 0 %
ALPHA1 GLOB SERPL ELPH-MCNC: 0.25 G/DL (ref 0.1–0.4)
ALPHA1 GLOB SERPL ELPH-MCNC: 3.3 % (ref 2.5–5)
ALPHA2 GLOB MFR UR ELPH: 0 %
ALPHA2 GLOB SERPL ELPH-MCNC: 0.7 G/DL (ref 0.4–1.2)
ALPHA2 GLOB SERPL ELPH-MCNC: 9.1 % (ref 7–13)
ANA HOMOGEN SER QL IF: NORMAL
ANA HOMOGEN TITR SER: NORMAL {TITER}
B-GLOBULIN MFR UR ELPH: 0 %
BETA GLOB ABNORMAL SERPL ELPH-MCNC: 0.45 G/DL (ref 0.4–0.8)
BETA1 GLOB SERPL ELPH-MCNC: 5.9 % (ref 5–13)
BETA2 GLOB SERPL ELPH-MCNC: 4.8 % (ref 2–8)
BETA2+GAMMA GLOB SERPL ELPH-MCNC: 0.37 G/DL (ref 0.2–0.5)
GAMMA GLOB ABNORMAL SERPL ELPH-MCNC: 1.56 G/DL (ref 0.5–1.6)
GAMMA GLOB MFR UR ELPH: 0 %
GAMMA GLOB SERPL ELPH-MCNC: 20.2 % (ref 12–22)
IGG/ALB SER: 1.31 {RATIO} (ref 1.1–1.8)
INTERPRETATION UR IFE-IMP: NORMAL
M PROTEIN 1 MFR SERPL ELPH: 6.5 %
M PROTEIN 1 SERPL ELPH-MCNC: 0.5 G/DL
PROT PATTERN SERPL ELPH-IMP: NORMAL
PROT PATTERN UR ELPH-IMP: NORMAL
PROT SERPL-MCNC: 7.7 G/DL (ref 6.4–8.2)
PROT UR-MCNC: 13 MG/DL
RYE IGE QN: POSITIVE

## 2019-02-18 ENCOUNTER — TELEPHONE (OUTPATIENT)
Dept: FAMILY MEDICINE CLINIC | Facility: CLINIC | Age: 49
End: 2019-02-18

## 2019-02-18 DIAGNOSIS — D64.9 ANEMIA, UNSPECIFIED TYPE: Primary | ICD-10-CM

## 2019-02-18 DIAGNOSIS — D47.2 MONOCLONAL GAMMOPATHY: ICD-10-CM

## 2019-02-22 ENCOUNTER — APPOINTMENT (OUTPATIENT)
Dept: LAB | Facility: HOSPITAL | Age: 49
End: 2019-02-22
Attending: FAMILY MEDICINE
Payer: COMMERCIAL

## 2019-02-22 DIAGNOSIS — D64.9 ANEMIA, UNSPECIFIED TYPE: ICD-10-CM

## 2019-02-22 LAB — HEMOCCULT STL QL IA: NEGATIVE

## 2019-02-22 PROCEDURE — G0328 FECAL BLOOD SCRN IMMUNOASSAY: HCPCS

## 2019-03-13 ENCOUNTER — OFFICE VISIT (OUTPATIENT)
Dept: FAMILY MEDICINE CLINIC | Facility: CLINIC | Age: 49
End: 2019-03-13
Payer: COMMERCIAL

## 2019-03-13 VITALS
RESPIRATION RATE: 20 BRPM | BODY MASS INDEX: 26.82 KG/M2 | WEIGHT: 140.6 LBS | DIASTOLIC BLOOD PRESSURE: 80 MMHG | TEMPERATURE: 97.4 F | OXYGEN SATURATION: 100 % | HEART RATE: 63 BPM | SYSTOLIC BLOOD PRESSURE: 120 MMHG

## 2019-03-13 DIAGNOSIS — N92.6 MENSTRUAL PERIOD LATE: ICD-10-CM

## 2019-03-13 DIAGNOSIS — R70.0 ELEVATED SED RATE: ICD-10-CM

## 2019-03-13 DIAGNOSIS — D64.9 ANEMIA, UNSPECIFIED TYPE: ICD-10-CM

## 2019-03-13 DIAGNOSIS — R76.8 POSITIVE ANA (ANTINUCLEAR ANTIBODY): ICD-10-CM

## 2019-03-13 DIAGNOSIS — D47.2 MONOCLONAL GAMMOPATHY: ICD-10-CM

## 2019-03-13 DIAGNOSIS — R49.0 HOARSENESS: ICD-10-CM

## 2019-03-13 DIAGNOSIS — T78.40XD ALLERGIC STATE, SUBSEQUENT ENCOUNTER: ICD-10-CM

## 2019-03-13 DIAGNOSIS — E61.1 LOW IRON: ICD-10-CM

## 2019-03-13 DIAGNOSIS — D72.819 LEUKOPENIA, UNSPECIFIED TYPE: Primary | ICD-10-CM

## 2019-03-13 PROCEDURE — 99214 OFFICE O/P EST MOD 30 MIN: CPT | Performed by: FAMILY MEDICINE

## 2019-03-13 NOTE — PROGRESS NOTES
Assessment/Plan:          Diagnoses and all orders for this visit:    Leukopenia, unspecified type  Comments: We referred patient to Hematology, to keep her appointment with Hematology    Anemia, unspecified type  Comments:  Keep her appointment with Hematology  Orders:  -     Ambulatory referral to Gastroenterology; Future    Menstrual period late  -     Follicle stimulating hormone; Future  -     Pregnancy Test (HCG Qualitative); Future    Monoclonal gammopathy  Comments:  keep  appointment with Hematology this week, March 15, 2019    Low iron  -     Ambulatory referral to Gastroenterology; Future    Positive GONZALO (antinuclear antibody)  -     DNA(DS) AB, High Avidity; Future  -     RF Screen w/ Reflex to Titer; Future  -     Anti-scleroderma antibody; Future    Elevated sed rate  -     DNA(DS) AB, High Avidity; Future  -     RF Screen w/ Reflex to Titer; Future  -     Anti-scleroderma antibody; Future    Hoarseness  -     Ambulatory Referral to Otolaryngology; Future    Allergic state, subsequent encounter  -     Ambulatory Referral to Otolaryngology; Future            Subjective:     Patient ID: Marquis Vale is a 50 y o  female      Patient is here for follow-up on her medical problem  Anemia  Denied fatigue, denied rectal bleeding, melena or other bleeding  Leukopenia  Denied sign or symptoms of infection  Allergy patient stated her symptoms is not well controlled by taking Claritin and Flonase  She still has nasal congestion and clear postnasal drainage  She says occasionally have hoarseness   LMP IS ON 2-5-19 pt stated her period  Is  1 week late   Patient stated her menstrual period every 28 days but occasionally is little late  For the last 2 years  Denied pelvic pain  Test results    Lab done on February 13 and February 22, 2019  Discussed result with patient      Review of Systems   Constitutional: Negative for activity change, appetite change, chills, fatigue, fever and unexpected weight change  HENT: Negative for congestion, ear pain and sore throat  Eyes: Negative for visual disturbance  Respiratory: Negative for cough, chest tightness, shortness of breath and wheezing  Cardiovascular: Negative for chest pain, palpitations and leg swelling  Gastrointestinal: Negative for abdominal pain, blood in stool, constipation, diarrhea, nausea and vomiting  Genitourinary: Negative for dysuria, frequency, hematuria and urgency  Musculoskeletal: Negative for arthralgias, back pain, gait problem, joint swelling, myalgias and neck pain  Skin: Negative for rash  Neurological: Negative for dizziness, tremors, seizures, syncope, weakness, light-headedness and headaches  Hematological: Negative for adenopathy  Does not bruise/bleed easily  Psychiatric/Behavioral: Negative for behavioral problems, confusion, dysphoric mood and sleep disturbance  Objective:     Physical Exam   Constitutional: She is oriented to person, place, and time  She appears well-developed and well-nourished  No distress  HENT:   Head: Normocephalic and atraumatic  Eyes: Pupils are equal, round, and reactive to light  Conjunctivae and EOM are normal  No scleral icterus  Neck: Neck supple  No JVD present  No thyromegaly present  Cardiovascular: Normal rate, regular rhythm and normal heart sounds  No murmur heard  Pulses:       Carotid pulses are 3+ on the right side, and 3+ on the left side  Extremities  No edema   Pulmonary/Chest: Effort normal and breath sounds normal    Abdominal: Soft  Bowel sounds are normal  She exhibits no distension and no mass  There is no tenderness  There is no rebound and no guarding  No hernia  Lymphadenopathy:     She has no cervical adenopathy  Neurological: She is alert and oriented to person, place, and time  No cranial nerve deficit  She exhibits normal muscle tone  Coordination normal    Skin: No rash noted  Psychiatric: She has a normal mood and affect   Her behavior is normal  Judgment and thought content normal

## 2019-03-15 ENCOUNTER — CONSULT (OUTPATIENT)
Dept: HEMATOLOGY ONCOLOGY | Facility: CLINIC | Age: 49
End: 2019-03-15
Payer: COMMERCIAL

## 2019-03-15 VITALS
DIASTOLIC BLOOD PRESSURE: 70 MMHG | BODY MASS INDEX: 26.85 KG/M2 | SYSTOLIC BLOOD PRESSURE: 100 MMHG | TEMPERATURE: 98.6 F | HEART RATE: 63 BPM | HEIGHT: 61 IN | OXYGEN SATURATION: 98 % | WEIGHT: 142.2 LBS | RESPIRATION RATE: 16 BRPM

## 2019-03-15 DIAGNOSIS — D47.2 MONOCLONAL GAMMOPATHY: Primary | ICD-10-CM

## 2019-03-15 DIAGNOSIS — D50.0 IRON DEFICIENCY ANEMIA DUE TO CHRONIC BLOOD LOSS: ICD-10-CM

## 2019-03-15 PROBLEM — D50.9 IRON DEFICIENCY ANEMIA, UNSPECIFIED: Status: ACTIVE | Noted: 2019-03-15

## 2019-03-15 PROCEDURE — 99244 OFF/OP CNSLTJ NEW/EST MOD 40: CPT | Performed by: INTERNAL MEDICINE

## 2019-03-15 NOTE — LETTER
March 15, 2019     Vinod Lynn MD  [phone]    Patient: Marquis Vale   YOB: 1970   Date of Visit: 3/15/2019       Dear Dr Tal Horn: Thank you for referring Marquis Spine to me for evaluation  Below are my notes for this consultation  If you have questions, please do not hesitate to call me  I look forward to following your patient along with you  Sincerely,        Nallely Hernandez MD        CC: No Recipients  Nallely Hernandez MD  3/15/2019 12:26 PM  Sign at close encounter  Hematology/Oncology Outpatient Consult  Marquis Spine 50 y o  female 1970 [id number]    Date:  3/15/2019    Assessment and Plan:  1  Monoclonal gammopathy  The patient was educated about the abnormal monoclonal gammopathy compatible with IgG kappa which needs to be further investigated to rule out active plasma cell dyscrasia  The Patient will get also a skeletal survey to rule out lytic lesions before her next visit  We will then discuss the results findings and act accordingly   - CBC and differential; Future  - Comprehensive metabolic panel; Future  - C-reactive protein; Future  - Sedimentation rate, automated; Future  - Erythropoietin  - Folate; Future  - Haptoglobin; Future  - Iron Panel; Future  - Vitamin B12; Future  - LD,Blood; Future  - Hemolysis Smear; Future  - Reticulocytes; Future  - IgG, IgA, IgM; Future  - Immunoglobulin free LT chains blood; Future  - Kappa / lambda light chains, urine, 24 hour; Future  - Beta 2 microglobulin, serum; Future  - Protein,Total and Protein Electrophoresis,24 Hour Urine and Immunofixation; Future  - Protein, Total and Protein Electrophoresis with Immunofixation; Future  - XR bone survey complete >12 mos; Future    2  Iron deficiency anemia due to chronic blood loss  We will pursue extensive workup to see if she would be a candidate for iron IV treatment since she is not benefitting from oral iron supplements    - CBC and differential; Future  - Ferritin; Future  - Iron Panel; Future      HPI:  This is a 41-year-old female with a history of bradycardia, hypotension, and 6 pregnancies with 5 full-term deliveries  The patient also seems to have chronic anemia with the iron deficiency which is being treated with the oral iron supplements  She also had extensive workup recently by her PCP including a serum protein electrophoresis which showed an M spike  The serum immunofixation showed IgG kappa monoclonal gammopathy at the 0 5 grams/deciliter  The UPEP was negative for monoclonal protein  Her GONZALO test came back positive  Her CBC on the 13th of February showed low white cell count at 3 6 with hemoglobin of 11 7 and MCV of 90 a normal platelet of 681  Her ANC was 1 5  The patient complained today about significant fatigue and achiness of her bones with the significant headaches on a daily basis  Interval history:    ROS: Review of Systems   Constitutional: Positive for fatigue  Negative for activity change, appetite change, chills, diaphoresis, fever and unexpected weight change  HENT: Negative for congestion, dental problem, ear discharge, ear pain, facial swelling, hearing loss, mouth sores, nosebleeds, postnasal drip, sore throat, tinnitus and trouble swallowing  Eyes: Negative for discharge, redness, itching and visual disturbance  Respiratory: Positive for cough and shortness of breath  Negative for chest tightness and wheezing  Hemoptysis   Cardiovascular: Negative for chest pain, palpitations and leg swelling  Gastrointestinal: Negative for abdominal distention, abdominal pain, anal bleeding, blood in stool, constipation, diarrhea, nausea and vomiting  Genitourinary: Negative for difficulty urinating, dysuria, flank pain, frequency, hematuria and urgency  Musculoskeletal: Positive for arthralgias  Negative for back pain, gait problem, joint swelling, myalgias and neck pain     Skin: Negative for color change, pallor, rash and wound  Neurological: Positive for dizziness and numbness  Negative for syncope, speech difficulty, weakness, light-headedness and headaches  Hematological: Negative for adenopathy  Does not bruise/bleed easily  Psychiatric/Behavioral: Positive for sleep disturbance  Negative for agitation, behavioral problems and confusion  Past Medical History:   Diagnosis Date    Bradycardia     Depression     Hypotension        Past Surgical History:   Procedure Laterality Date    HEMORROIDECTOMY      WRIST SURGERY Right        Social History     Socioeconomic History    Marital status:      Spouse name: None    Number of children: None    Years of education: None    Highest education level: None   Occupational History    None   Social Needs    Financial resource strain: None    Food insecurity:     Worry: None     Inability: None    Transportation needs:     Medical: None     Non-medical: None   Tobacco Use    Smoking status: Never Smoker    Smokeless tobacco: Never Used   Substance and Sexual Activity    Alcohol use:  Yes     Alcohol/week: 0 6 oz     Types: 1 Glasses of wine per week     Comment: social    Drug use: No    Sexual activity: None   Lifestyle    Physical activity:     Days per week: None     Minutes per session: None    Stress: None   Relationships    Social connections:     Talks on phone: None     Gets together: None     Attends [employer] service: None     Active member of club or organization: None     Attends meetings of clubs or organizations: None     Relationship status: None    Intimate partner violence:     Fear of current or ex partner: None     Emotionally abused: None     Physically abused: None     Forced sexual activity: None   Other Topics Concern    None   Social History Narrative    None       Family History   Problem Relation Age of Onset    Hypertension Maternal Grandmother     Diabetes Paternal Grandmother        No Known Allergies      Current Outpatient Medications:     fluticasone (FLONASE) 50 mcg/act nasal spray, 2 sprays into each nostril daily, Disp: , Rfl:     loratadine (CLARITIN) 10 mg tablet, Take 1 tablet (10 mg total) by mouth daily Take 1 tablet daily p o  (Patient taking differently: Take 10 mg by mouth daily as needed ), Disp: 30 tablet, Rfl: 0      Physical Exam:  /70 (BP Location: Left arm, Cuff Size: Adult)   Pulse 63   Temp 98 6 °F (37 °C) (Tympanic)   Resp 16   Ht 5' 0 71" (1 542 m)   Wt 64 5 kg (142 lb 3 2 oz)   SpO2 98%   BMI 27 13 kg/m²      Physical Exam   Constitutional: She is oriented to person, place, and time  She appears well-developed and well-nourished  No distress  HENT:   Head: Normocephalic and atraumatic  Nose: Nose normal    Mouth/Throat: Oropharynx is clear and moist    Eyes: Pupils are equal, round, and reactive to light  Conjunctivae and EOM are normal  Right eye exhibits no discharge  Left eye exhibits no discharge  No scleral icterus  Neck: Normal range of motion  Neck supple  No JVD present  No tracheal deviation present  No thyromegaly present  Cardiovascular: Normal rate, regular rhythm and normal heart sounds  Exam reveals no friction rub  No murmur heard  Pulmonary/Chest: Effort normal and breath sounds normal  No stridor  No respiratory distress  She has no wheezes  She has no rales  She exhibits no tenderness  Abdominal: Soft  Bowel sounds are normal  She exhibits no distension and no mass  There is no hepatosplenomegaly, splenomegaly or hepatomegaly  There is no tenderness  There is no rebound and no guarding  Musculoskeletal: Normal range of motion  She exhibits no edema, tenderness or deformity  Lymphadenopathy:     She has no cervical adenopathy  Neurological: She is alert and oriented to person, place, and time  She has normal reflexes  No cranial nerve deficit  Coordination normal    Skin: Skin is warm and dry  No rash noted  She is not diaphoretic  No erythema   No pallor  Psychiatric: She has a normal mood and affect  Her behavior is normal  Judgment and thought content normal          Labs:  Lab Results   Component Value Date    WBC 3 60 (L) 02/13/2019    HGB 11 7 (L) 02/13/2019    HCT 36 4 02/13/2019    MCV 90 02/13/2019     02/13/2019     Lab Results   Component Value Date    K 3 9 02/01/2019     02/01/2019    CO2 25 02/01/2019    BUN 10 02/01/2019    CREATININE 0 60 02/01/2019    GLUF 91 02/01/2019    CALCIUM 9 0 02/01/2019    AST 25 02/01/2019    ALT 18 02/01/2019    ALKPHOS 63 02/01/2019    EGFR 125 02/01/2019       Patient voiced understanding and agreement in the above discussion  Aware to contact our office with questions/symptoms in the interim

## 2019-03-15 NOTE — PROGRESS NOTES
Hematology/Oncology Outpatient Consult  Jonh Gutierrez 50 y o  female 1970 [id number]    Date:  3/15/2019    Assessment and Plan:  1  Monoclonal gammopathy  The patient was educated about the abnormal monoclonal gammopathy compatible with IgG kappa which needs to be further investigated to rule out active plasma cell dyscrasia  The Patient will get also a skeletal survey to rule out lytic lesions before her next visit  We will then discuss the results findings and act accordingly   - CBC and differential; Future  - Comprehensive metabolic panel; Future  - C-reactive protein; Future  - Sedimentation rate, automated; Future  - Erythropoietin  - Folate; Future  - Haptoglobin; Future  - Iron Panel; Future  - Vitamin B12; Future  - LD,Blood; Future  - Hemolysis Smear; Future  - Reticulocytes; Future  - IgG, IgA, IgM; Future  - Immunoglobulin free LT chains blood; Future  - Kappa / lambda light chains, urine, 24 hour; Future  - Beta 2 microglobulin, serum; Future  - Protein,Total and Protein Electrophoresis,24 Hour Urine and Immunofixation; Future  - Protein, Total and Protein Electrophoresis with Immunofixation; Future  - XR bone survey complete >12 mos; Future    2  Iron deficiency anemia due to chronic blood loss  We will pursue extensive workup to see if she would be a candidate for iron IV treatment since she is not benefitting from oral iron supplements  - CBC and differential; Future  - Ferritin; Future  - Iron Panel; Future      HPI:  This is a 51-year-old female with a history of bradycardia, hypotension, and 6 pregnancies with 5 full-term deliveries  The patient also seems to have chronic anemia with the iron deficiency which is being treated with the oral iron supplements  She also had extensive workup recently by her PCP including a serum protein electrophoresis which showed an M spike  The serum immunofixation showed IgG kappa monoclonal gammopathy at the 0 5 grams/deciliter    The UPEP was negative for monoclonal protein  Her GONZALO test came back positive  Her CBC on the 13th of February showed low white cell count at 3 6 with hemoglobin of 11 7 and MCV of 90 a normal platelet of 363  Her ANC was 1 5  The patient complained today about significant fatigue and achiness of her bones with the significant headaches on a daily basis  Interval history:    ROS: Review of Systems   Constitutional: Positive for fatigue  Negative for activity change, appetite change, chills, diaphoresis, fever and unexpected weight change  HENT: Negative for congestion, dental problem, ear discharge, ear pain, facial swelling, hearing loss, mouth sores, nosebleeds, postnasal drip, sore throat, tinnitus and trouble swallowing  Eyes: Negative for discharge, redness, itching and visual disturbance  Respiratory: Positive for cough and shortness of breath  Negative for chest tightness and wheezing  Hemoptysis   Cardiovascular: Negative for chest pain, palpitations and leg swelling  Gastrointestinal: Negative for abdominal distention, abdominal pain, anal bleeding, blood in stool, constipation, diarrhea, nausea and vomiting  Genitourinary: Negative for difficulty urinating, dysuria, flank pain, frequency, hematuria and urgency  Musculoskeletal: Positive for arthralgias  Negative for back pain, gait problem, joint swelling, myalgias and neck pain  Skin: Negative for color change, pallor, rash and wound  Neurological: Positive for dizziness and numbness  Negative for syncope, speech difficulty, weakness, light-headedness and headaches  Hematological: Negative for adenopathy  Does not bruise/bleed easily  Psychiatric/Behavioral: Positive for sleep disturbance  Negative for agitation, behavioral problems and confusion         Past Medical History:   Diagnosis Date    Bradycardia     Depression     Hypotension        Past Surgical History:   Procedure Laterality Date    HEMORROIDECTOMY      WRIST SURGERY Right        Social History     Socioeconomic History    Marital status:      Spouse name: None    Number of children: None    Years of education: None    Highest education level: None   Occupational History    None   Social Needs    Financial resource strain: None    Food insecurity:     Worry: None     Inability: None    Transportation needs:     Medical: None     Non-medical: None   Tobacco Use    Smoking status: Never Smoker    Smokeless tobacco: Never Used   Substance and Sexual Activity    Alcohol use:  Yes     Alcohol/week: 0 6 oz     Types: 1 Glasses of wine per week     Comment: social    Drug use: No    Sexual activity: None   Lifestyle    Physical activity:     Days per week: None     Minutes per session: None    Stress: None   Relationships    Social connections:     Talks on phone: None     Gets together: None     Attends Muslim service: None     Active member of club or organization: None     Attends meetings of clubs or organizations: None     Relationship status: None    Intimate partner violence:     Fear of current or ex partner: None     Emotionally abused: None     Physically abused: None     Forced sexual activity: None   Other Topics Concern    None   Social History Narrative    None       Family History   Problem Relation Age of Onset    Hypertension Maternal Grandmother     Diabetes Paternal Grandmother        No Known Allergies      Current Outpatient Medications:     fluticasone (FLONASE) 50 mcg/act nasal spray, 2 sprays into each nostril daily, Disp: , Rfl:     loratadine (CLARITIN) 10 mg tablet, Take 1 tablet (10 mg total) by mouth daily Take 1 tablet daily p o  (Patient taking differently: Take 10 mg by mouth daily as needed ), Disp: 30 tablet, Rfl: 0      Physical Exam:  /70 (BP Location: Left arm, Cuff Size: Adult)   Pulse 63   Temp 98 6 °F (37 °C) (Tympanic)   Resp 16   Ht 5' 0 71" (1 542 m)   Wt 64 5 kg (142 lb 3 2 oz)   SpO2 98%   BMI 27 13 kg/m²     Physical Exam   Constitutional: She is oriented to person, place, and time  She appears well-developed and well-nourished  No distress  HENT:   Head: Normocephalic and atraumatic  Nose: Nose normal    Mouth/Throat: Oropharynx is clear and moist    Eyes: Pupils are equal, round, and reactive to light  Conjunctivae and EOM are normal  Right eye exhibits no discharge  Left eye exhibits no discharge  No scleral icterus  Neck: Normal range of motion  Neck supple  No JVD present  No tracheal deviation present  No thyromegaly present  Cardiovascular: Normal rate, regular rhythm and normal heart sounds  Exam reveals no friction rub  No murmur heard  Pulmonary/Chest: Effort normal and breath sounds normal  No stridor  No respiratory distress  She has no wheezes  She has no rales  She exhibits no tenderness  Abdominal: Soft  Bowel sounds are normal  She exhibits no distension and no mass  There is no hepatosplenomegaly, splenomegaly or hepatomegaly  There is no tenderness  There is no rebound and no guarding  Musculoskeletal: Normal range of motion  She exhibits no edema, tenderness or deformity  Lymphadenopathy:     She has no cervical adenopathy  Neurological: She is alert and oriented to person, place, and time  She has normal reflexes  No cranial nerve deficit  Coordination normal    Skin: Skin is warm and dry  No rash noted  She is not diaphoretic  No erythema  No pallor  Psychiatric: She has a normal mood and affect   Her behavior is normal  Judgment and thought content normal          Labs:  Lab Results   Component Value Date    WBC 3 60 (L) 02/13/2019    HGB 11 7 (L) 02/13/2019    HCT 36 4 02/13/2019    MCV 90 02/13/2019     02/13/2019     Lab Results   Component Value Date    K 3 9 02/01/2019     02/01/2019    CO2 25 02/01/2019    BUN 10 02/01/2019    CREATININE 0 60 02/01/2019    GLUF 91 02/01/2019    CALCIUM 9 0 02/01/2019    AST 25 02/01/2019    ALT 18 02/01/2019 ALKPHOS 63 02/01/2019    EGFR 125 02/01/2019       Patient voiced understanding and agreement in the above discussion  Aware to contact our office with questions/symptoms in the interim

## 2019-03-26 ENCOUNTER — TELEPHONE (OUTPATIENT)
Dept: FAMILY MEDICINE CLINIC | Facility: CLINIC | Age: 49
End: 2019-03-26

## 2019-03-26 NOTE — TELEPHONE ENCOUNTER
Left message on patients voice mail to return phone call to schedule her one month follow up appointment

## 2019-04-22 ENCOUNTER — TELEPHONE (OUTPATIENT)
Dept: HEMATOLOGY ONCOLOGY | Facility: CLINIC | Age: 49
End: 2019-04-22

## 2019-05-04 ENCOUNTER — APPOINTMENT (OUTPATIENT)
Dept: LAB | Facility: HOSPITAL | Age: 49
End: 2019-05-04
Attending: FAMILY MEDICINE
Payer: COMMERCIAL

## 2019-05-04 ENCOUNTER — TRANSCRIBE ORDERS (OUTPATIENT)
Dept: ADMINISTRATIVE | Facility: HOSPITAL | Age: 49
End: 2019-05-04

## 2019-05-04 ENCOUNTER — HOSPITAL ENCOUNTER (OUTPATIENT)
Dept: RADIOLOGY | Facility: HOSPITAL | Age: 49
Discharge: HOME | End: 2019-05-04
Attending: INTERNAL MEDICINE
Payer: COMMERCIAL

## 2019-05-04 DIAGNOSIS — R70.0 ELEVATED SED RATE: ICD-10-CM

## 2019-05-04 DIAGNOSIS — D50.0 IRON DEFICIENCY ANEMIA DUE TO CHRONIC BLOOD LOSS: ICD-10-CM

## 2019-05-04 DIAGNOSIS — R76.8 POSITIVE ANA (ANTINUCLEAR ANTIBODY): ICD-10-CM

## 2019-05-04 DIAGNOSIS — D47.2 MONOCLONAL GAMMOPATHY: ICD-10-CM

## 2019-05-04 DIAGNOSIS — N92.6 MENSTRUAL PERIOD LATE: ICD-10-CM

## 2019-05-04 LAB
ALBUMIN SERPL BCP-MCNC: 4.4 G/DL (ref 3–5.2)
ALP SERPL-CCNC: 59 U/L (ref 43–122)
ALT SERPL W P-5'-P-CCNC: 10 U/L (ref 9–52)
ANION GAP SERPL CALCULATED.3IONS-SCNC: 7 MMOL/L (ref 5–14)
AST SERPL W P-5'-P-CCNC: 21 U/L (ref 14–36)
BASOPHILS # BLD AUTO: 0 THOUSANDS/ΜL (ref 0–0.1)
BASOPHILS NFR BLD AUTO: 1 % (ref 0–1)
BILIRUB SERPL-MCNC: 0.3 MG/DL
BLD SMEAR INTERP: NORMAL
BUN SERPL-MCNC: 12 MG/DL (ref 5–25)
CALCIUM SERPL-MCNC: 9.2 MG/DL (ref 8.4–10.2)
CHLORIDE SERPL-SCNC: 105 MMOL/L (ref 97–108)
CO2 SERPL-SCNC: 26 MMOL/L (ref 22–30)
CREAT SERPL-MCNC: 0.62 MG/DL (ref 0.6–1.2)
CRP SERPL QL: <3 MG/L
EOSINOPHIL # BLD AUTO: 0.2 THOUSAND/ΜL (ref 0–0.4)
EOSINOPHIL NFR BLD AUTO: 4 % (ref 0–6)
ERYTHROCYTE [DISTWIDTH] IN BLOOD BY AUTOMATED COUNT: 13.8 %
ERYTHROCYTE [SEDIMENTATION RATE] IN BLOOD: 32 MM/HOUR (ref 1–20)
FERRITIN SERPL-MCNC: 10 NG/ML (ref 8–388)
FOLATE SERPL-MCNC: 19.8 NG/ML (ref 3.1–17.5)
FSH SERPL-ACNC: 3.7 MIU/ML
GFR SERPL CREATININE-BSD FRML MDRD: 123 ML/MIN/1.73SQ M
GLUCOSE SERPL-MCNC: 71 MG/DL (ref 70–99)
HCG SERPL QL: NEGATIVE
HCT VFR BLD AUTO: 36.9 % (ref 36–46)
HGB BLD-MCNC: 12.1 G/DL (ref 12–16)
IGA SERPL-MCNC: 243 MG/DL (ref 70–400)
IGG SERPL-MCNC: 1400 MG/DL (ref 700–1600)
IGM SERPL-MCNC: 157 MG/DL (ref 40–230)
IRON SATN MFR SERPL: 25 %
IRON SERPL-MCNC: 86 UG/DL (ref 50–170)
LDH SERPL-CCNC: 402 U/L (ref 313–618)
LYMPHOCYTES # BLD AUTO: 1.5 THOUSANDS/ΜL (ref 0.5–4)
LYMPHOCYTES NFR BLD AUTO: 38 % (ref 25–45)
MCH RBC QN AUTO: 29.6 PG (ref 26–34)
MCHC RBC AUTO-ENTMCNC: 32.7 G/DL (ref 31–36)
MCV RBC AUTO: 91 FL (ref 80–100)
MONOCYTES # BLD AUTO: 0.4 THOUSAND/ΜL (ref 0.2–0.9)
MONOCYTES NFR BLD AUTO: 10 % (ref 1–10)
NEUTROPHILS # BLD AUTO: 1.9 THOUSANDS/ΜL (ref 1.8–7.8)
NEUTS SEG NFR BLD AUTO: 48 % (ref 45–65)
PLATELET # BLD AUTO: 187 THOUSANDS/UL (ref 150–450)
PMV BLD AUTO: 8.5 FL (ref 8.9–12.7)
POTASSIUM SERPL-SCNC: 4.2 MMOL/L (ref 3.6–5)
PROT SERPL-MCNC: 7.8 G/DL (ref 5.9–8.4)
RBC # BLD AUTO: 4.07 MILLION/UL (ref 4–5.2)
RETICS # CALC: 0.81 % (ref 0.87–2.63)
SODIUM SERPL-SCNC: 138 MMOL/L (ref 137–147)
TIBC SERPL-MCNC: 345 UG/DL (ref 250–450)
VIT B12 SERPL-MCNC: 698 PG/ML (ref 100–900)
WBC # BLD AUTO: 4.1 THOUSAND/UL (ref 4.5–11)

## 2019-05-04 PROCEDURE — 36415 COLL VENOUS BLD VENIPUNCTURE: CPT | Performed by: INTERNAL MEDICINE

## 2019-05-04 PROCEDURE — 86235 NUCLEAR ANTIGEN ANTIBODY: CPT

## 2019-05-04 PROCEDURE — 83001 ASSAY OF GONADOTROPIN (FSH): CPT

## 2019-05-04 PROCEDURE — 83010 ASSAY OF HAPTOGLOBIN QUANT: CPT

## 2019-05-04 PROCEDURE — 86140 C-REACTIVE PROTEIN: CPT

## 2019-05-04 PROCEDURE — 82232 ASSAY OF BETA-2 PROTEIN: CPT

## 2019-05-04 PROCEDURE — 77075 RADEX OSSEOUS SURVEY COMPL: CPT

## 2019-05-04 PROCEDURE — 82728 ASSAY OF FERRITIN: CPT

## 2019-05-04 PROCEDURE — 82746 ASSAY OF FOLIC ACID SERUM: CPT

## 2019-05-04 PROCEDURE — 85045 AUTOMATED RETICULOCYTE COUNT: CPT

## 2019-05-04 PROCEDURE — 86430 RHEUMATOID FACTOR TEST QUAL: CPT

## 2019-05-04 PROCEDURE — 84703 CHORIONIC GONADOTROPIN ASSAY: CPT

## 2019-05-04 PROCEDURE — 82784 ASSAY IGA/IGD/IGG/IGM EACH: CPT

## 2019-05-04 PROCEDURE — 83540 ASSAY OF IRON: CPT

## 2019-05-04 PROCEDURE — 84165 PROTEIN E-PHORESIS SERUM: CPT | Performed by: PATHOLOGY

## 2019-05-04 PROCEDURE — 82668 ASSAY OF ERYTHROPOIETIN: CPT | Performed by: INTERNAL MEDICINE

## 2019-05-04 PROCEDURE — 83550 IRON BINDING TEST: CPT

## 2019-05-04 PROCEDURE — 83615 LACTATE (LD) (LDH) ENZYME: CPT

## 2019-05-04 PROCEDURE — 85025 COMPLETE CBC W/AUTO DIFF WBC: CPT

## 2019-05-04 PROCEDURE — 83883 ASSAY NEPHELOMETRY NOT SPEC: CPT

## 2019-05-04 PROCEDURE — 80053 COMPREHEN METABOLIC PANEL: CPT

## 2019-05-04 PROCEDURE — 84165 PROTEIN E-PHORESIS SERUM: CPT

## 2019-05-04 PROCEDURE — 86225 DNA ANTIBODY NATIVE: CPT

## 2019-05-04 PROCEDURE — 85652 RBC SED RATE AUTOMATED: CPT

## 2019-05-04 PROCEDURE — 82607 VITAMIN B-12: CPT

## 2019-05-05 LAB — HAPTOGLOB SERPL-MCNC: 133 MG/DL (ref 34–200)

## 2019-05-06 LAB
ALBUMIN SERPL ELPH-MCNC: 3.97 G/DL (ref 3.5–5)
ALBUMIN SERPL ELPH-MCNC: 55.1 % (ref 52–65)
ALPHA1 GLOB SERPL ELPH-MCNC: 0.29 G/DL (ref 0.1–0.4)
ALPHA1 GLOB SERPL ELPH-MCNC: 4 % (ref 2.5–5)
ALPHA2 GLOB SERPL ELPH-MCNC: 0.73 G/DL (ref 0.4–1.2)
ALPHA2 GLOB SERPL ELPH-MCNC: 10.1 % (ref 7–13)
BETA GLOB ABNORMAL SERPL ELPH-MCNC: 0.45 G/DL (ref 0.4–0.8)
BETA1 GLOB SERPL ELPH-MCNC: 6.3 % (ref 5–13)
BETA2 GLOB SERPL ELPH-MCNC: 4.9 % (ref 2–8)
BETA2+GAMMA GLOB SERPL ELPH-MCNC: 0.35 G/DL (ref 0.2–0.5)
ENA SCL70 AB SER-ACNC: <0.2 AI (ref 0–0.9)
GAMMA GLOB ABNORMAL SERPL ELPH-MCNC: 1.41 G/DL (ref 0.5–1.6)
GAMMA GLOB SERPL ELPH-MCNC: 19.6 % (ref 12–22)
IGG/ALB SER: 1.23 {RATIO} (ref 1.1–1.8)
M PROTEIN 1 MFR SERPL ELPH: 5.3 %
M PROTEIN 1 SERPL ELPH-MCNC: 0.38 G/DL
PROT PATTERN SERPL ELPH-IMP: NORMAL
PROT SERPL-MCNC: 7.2 G/DL (ref 6.4–8.2)
RHEUMATOID FACT SER QL LA: NEGATIVE

## 2019-05-07 LAB
B2 MICROGLOB SERPL-MCNC: 0.8 MG/L (ref 0.6–2.4)
EPO SERPL-ACNC: 13.2 MIU/ML (ref 2.6–18.5)
KAPPA LC FREE SER-MCNC: 17.6 MG/L (ref 3.3–19.4)
KAPPA LC FREE/LAMBDA FREE SER: 1.39 {RATIO} (ref 0.26–1.65)
LAMBDA LC FREE SERPL-MCNC: 12.7 MG/L (ref 5.7–26.3)

## 2019-05-14 ENCOUNTER — TELEPHONE (OUTPATIENT)
Dept: HEMATOLOGY ONCOLOGY | Facility: CLINIC | Age: 49
End: 2019-05-14

## 2019-05-14 LAB — MISCELLANEOUS LAB TEST RESULT: NORMAL

## 2019-06-17 ENCOUNTER — OFFICE VISIT (OUTPATIENT)
Dept: HEMATOLOGY ONCOLOGY | Facility: CLINIC | Age: 49
End: 2019-06-17
Payer: COMMERCIAL

## 2019-06-17 VITALS
WEIGHT: 137.4 LBS | TEMPERATURE: 99 F | DIASTOLIC BLOOD PRESSURE: 70 MMHG | HEIGHT: 61 IN | RESPIRATION RATE: 16 BRPM | SYSTOLIC BLOOD PRESSURE: 100 MMHG | HEART RATE: 57 BPM | BODY MASS INDEX: 25.94 KG/M2 | OXYGEN SATURATION: 98 %

## 2019-06-17 DIAGNOSIS — D50.0 IRON DEFICIENCY ANEMIA DUE TO CHRONIC BLOOD LOSS: ICD-10-CM

## 2019-06-17 DIAGNOSIS — D47.2 MONOCLONAL GAMMOPATHY: Primary | ICD-10-CM

## 2019-06-17 PROCEDURE — 99214 OFFICE O/P EST MOD 30 MIN: CPT | Performed by: INTERNAL MEDICINE

## 2019-06-18 ENCOUNTER — APPOINTMENT (OUTPATIENT)
Dept: LAB | Facility: HOSPITAL | Age: 49
End: 2019-06-18
Attending: INTERNAL MEDICINE
Payer: COMMERCIAL

## 2019-06-18 PROCEDURE — 84156 ASSAY OF PROTEIN URINE: CPT

## 2019-06-18 PROCEDURE — 83883 ASSAY NEPHELOMETRY NOT SPEC: CPT

## 2019-06-18 PROCEDURE — 84166 PROTEIN E-PHORESIS/URINE/CSF: CPT

## 2019-06-19 LAB
KAPPA LC UR-MCNC: 21.2 MG/L (ref 1.35–24.19)
KAPPA LC/LAMBDA UR: 1.94 {RATIO} (ref 2.04–10.37)
LAMBDA LC UR-MCNC: 10.9 MG/L (ref 0.24–6.66)
PROT 24H UR-MCNC: 56.25 MG/24 HRS (ref 40–150)
SPECIMEN VOL UR: 625 ML

## 2019-07-01 ENCOUNTER — ANNUAL EXAM (OUTPATIENT)
Dept: OBGYN CLINIC | Facility: CLINIC | Age: 49
End: 2019-07-01

## 2019-07-01 VITALS
HEIGHT: 61 IN | SYSTOLIC BLOOD PRESSURE: 120 MMHG | WEIGHT: 137.8 LBS | BODY MASS INDEX: 26.01 KG/M2 | DIASTOLIC BLOOD PRESSURE: 80 MMHG

## 2019-07-01 DIAGNOSIS — Z20.2 POSSIBLE EXPOSURE TO STD: ICD-10-CM

## 2019-07-01 DIAGNOSIS — Z11.3 SCREENING EXAMINATION FOR SEXUALLY TRANSMITTED DISEASE: ICD-10-CM

## 2019-07-01 DIAGNOSIS — Z12.4 SCREENING FOR CERVICAL CANCER: ICD-10-CM

## 2019-07-01 DIAGNOSIS — Z12.39 BREAST CANCER SCREENING: ICD-10-CM

## 2019-07-01 DIAGNOSIS — Z01.419 ENCOUNTER FOR GYNECOLOGICAL EXAMINATION (GENERAL) (ROUTINE) WITHOUT ABNORMAL FINDINGS: Primary | ICD-10-CM

## 2019-07-01 PROCEDURE — 99396 PREV VISIT EST AGE 40-64: CPT | Performed by: OBSTETRICS & GYNECOLOGY

## 2019-07-01 PROCEDURE — 87491 CHLMYD TRACH DNA AMP PROBE: CPT | Performed by: OBSTETRICS & GYNECOLOGY

## 2019-07-01 PROCEDURE — 87624 HPV HI-RISK TYP POOLED RSLT: CPT | Performed by: OBSTETRICS & GYNECOLOGY

## 2019-07-01 PROCEDURE — G0145 SCR C/V CYTO,THINLAYER,RESCR: HCPCS | Performed by: PATHOLOGY

## 2019-07-01 PROCEDURE — G0124 SCREEN C/V THIN LAYER BY MD: HCPCS | Performed by: PATHOLOGY

## 2019-07-01 PROCEDURE — 87591 N.GONORRHOEAE DNA AMP PROB: CPT | Performed by: OBSTETRICS & GYNECOLOGY

## 2019-07-01 NOTE — PROGRESS NOTES
Assessment/Plan:             Diagnoses and all orders for this visit:    Encounter for gynecological examination (general) (routine) without abnormal findings    Screening for cervical cancer  -     Liquid-based pap, screening; Future    Breast cancer screening  -     Mammo screening bilateral w cad; Future    Possible exposure to STD  -     Liquid-based pap, screening; Future  -     Chlamydia/GC amplified DNA by PCR; Future    RTO for Mirena insertion  Subjective:      Patient ID: Eivn Wilder is a 50 y o  female presents for annual exam    Patient has been experiencing irregularity with her menses for the past several years especially in the past couple of months  Her cycle lasts between 4 days to 15 days with varying degrees of heaviness  The patient reports menses occur with one week to several months a part  She denies abdominal pain, nausea, vomiting, dysuria, or abnormal discharge  Additionally, she would like to discuss IUD placement  She is sexually active with a male and the relationship is monogamous  She has no changes to her medical history  Her last pap was 2014 and was negative  Her last mammo was in 2017 and was negative  HPI    The following portions of the patient's history were reviewed and updated as appropriate: allergies, current medications, past family history, past medical history, past social history, past surgical history and problem list     Review of Systems      Objective:      /80   Ht 5' 0 71" (1 542 m)   Wt 62 5 kg (137 lb 12 8 oz)   LMP 06/10/2019 (Exact Date)   BMI 26 29 kg/m²          Physical Exam   Constitutional: She is oriented to person, place, and time  She appears well-developed and well-nourished  No distress  HENT:   Head: Normocephalic  Neck: No thyromegaly present  Cardiovascular: Normal rate, regular rhythm and normal heart sounds  No murmur heard  Pulmonary/Chest: Effort normal and breath sounds normal  No respiratory distress   She has no wheezes  She has no rales  She exhibits no tenderness  No breast tenderness, discharge or bleeding  Abdominal: Soft  Bowel sounds are normal  She exhibits no distension and no mass  There is no tenderness  There is no rebound and no guarding  Genitourinary: Uterus normal  Rectal exam shows no external hemorrhoid  No breast tenderness, discharge or bleeding  No labial fusion  There is no rash, tenderness, lesion or injury on the right labia  There is no rash, tenderness, lesion or injury on the left labia  Cervix exhibits no motion tenderness, no discharge and no friability  Right adnexum displays no mass, no tenderness and no fullness  Left adnexum displays no mass, no tenderness and no fullness  Musculoskeletal: She exhibits no edema  Lymphadenopathy:        Right: No inguinal adenopathy present  Left: No inguinal adenopathy present  Neurological: She is alert and oriented to person, place, and time  Skin: Skin is warm and dry  Psychiatric: She has a normal mood and affect  Her behavior is normal  Judgment and thought content normal    Nursing note and vitals reviewed

## 2019-07-02 LAB
HPV HR 12 DNA CVX QL NAA+PROBE: NEGATIVE
HPV16 DNA CVX QL NAA+PROBE: NEGATIVE
HPV18 DNA CVX QL NAA+PROBE: NEGATIVE

## 2019-07-03 LAB
C TRACH DNA SPEC QL NAA+PROBE: NEGATIVE
N GONORRHOEA DNA SPEC QL NAA+PROBE: NEGATIVE

## 2019-07-06 ENCOUNTER — HOSPITAL ENCOUNTER (OUTPATIENT)
Dept: MAMMOGRAPHY | Facility: CLINIC | Age: 49
Discharge: HOME | End: 2019-07-06
Payer: COMMERCIAL

## 2019-07-06 VITALS — BODY MASS INDEX: 26.9 KG/M2 | HEIGHT: 60 IN | WEIGHT: 137 LBS

## 2019-07-06 DIAGNOSIS — Z12.39 BREAST CANCER SCREENING: ICD-10-CM

## 2019-07-06 LAB — MISCELLANEOUS LAB TEST RESULT: NORMAL

## 2019-07-06 PROCEDURE — 77067 SCR MAMMO BI INCL CAD: CPT

## 2019-07-09 LAB
LAB AP GYN PRIMARY INTERPRETATION: ABNORMAL
Lab: ABNORMAL
PATH INTERP SPEC-IMP: ABNORMAL

## 2019-07-10 ENCOUNTER — PROCEDURE VISIT (OUTPATIENT)
Dept: OBGYN CLINIC | Facility: CLINIC | Age: 49
End: 2019-07-10

## 2019-07-10 VITALS
HEIGHT: 60 IN | BODY MASS INDEX: 26.5 KG/M2 | WEIGHT: 135 LBS | SYSTOLIC BLOOD PRESSURE: 110 MMHG | DIASTOLIC BLOOD PRESSURE: 70 MMHG

## 2019-07-10 DIAGNOSIS — R87.619 ENDOMETRIAL CELLS ON CERVICAL PAP SMEAR INCONSISTENT W/LMP: ICD-10-CM

## 2019-07-10 DIAGNOSIS — Z30.430 ENCOUNTER FOR IUD INSERTION: Primary | ICD-10-CM

## 2019-07-10 PROCEDURE — 58100 BIOPSY OF UTERUS LINING: CPT | Performed by: OBSTETRICS & GYNECOLOGY

## 2019-07-10 PROCEDURE — 88305 TISSUE EXAM BY PATHOLOGIST: CPT | Performed by: PATHOLOGY

## 2019-07-10 PROCEDURE — 58300 INSERT INTRAUTERINE DEVICE: CPT | Performed by: OBSTETRICS & GYNECOLOGY

## 2019-07-10 NOTE — PROGRESS NOTES
Endometrial biopsy  Date/Time: 7/10/2019 4:04 PM  Performed by: Wes Carter MD  Authorized by: Wes Carter MD     Consent:     Consent obtained:  Verbal and written    Consent given by:  Patient    Procedural risks discussed:  Bleeding and infection    Patient questions answered: yes      Patient agrees, verbalizes understanding, and wants to proceed: yes      Instructions and paperwork completed: yes    Indication:     Indications:  Other disorder of menstruation and other abnormal bleeding from female genital tract    Pre-procedure:     Pre-procedure timeout performed: yes    Procedure:     Procedure: endometrial biopsy with Pipelle      A bivalve speculum was placed in the vagina: yes      Cervix cleaned and prepped: yes      The cervix was dilated: no      Uterus sounded: yes      Uterus sound depth (cm):  9    Specimen collected: specimen collected and sent to pathology      Patient tolerated procedure well with no complications: yes    Findings:     Uterus size:  Non-gravid    Cervix: normal

## 2019-07-10 NOTE — PROGRESS NOTES
Iud insertions  Date/Time: 7/10/2019 4:05 PM  Performed by: Angel Henry MD  Authorized by: Angel Henry MD     Consent:     Consent obtained:  Verbal and written    Consent given by:  Patient    Procedure risks and benefits discussed: yes      Patient questions answered: yes      Patient agrees, verbalizes understanding, and wants to proceed: yes    Procedure:     Pelvic exam performed: yes      Negative GC/chlamydia test: yes      Negative urine pregnancy test: no      Cervix cleaned and prepped: yes      Speculum placed in vagina: yes      Tenaculum applied to cervix: yes      Uterus sounded: yes      Uterus sound depth (cm):  9    IUD inserted with no complications: yes      IUD type:  Mirena    Strings trimmed: yes    Post-procedure:     Patient tolerated procedure well: yes      Patient will follow up after next period: yes

## 2019-08-05 ENCOUNTER — OFFICE VISIT (OUTPATIENT)
Dept: OBGYN CLINIC | Facility: CLINIC | Age: 49
End: 2019-08-05

## 2019-08-05 VITALS
HEIGHT: 60 IN | SYSTOLIC BLOOD PRESSURE: 150 MMHG | BODY MASS INDEX: 26.74 KG/M2 | WEIGHT: 136.2 LBS | DIASTOLIC BLOOD PRESSURE: 100 MMHG

## 2019-08-05 DIAGNOSIS — Z30.431 IUD CHECK UP: ICD-10-CM

## 2019-08-05 DIAGNOSIS — Z71.2 ENCOUNTER TO DISCUSS TEST RESULTS: Primary | ICD-10-CM

## 2019-08-05 PROCEDURE — 99213 OFFICE O/P EST LOW 20 MIN: CPT | Performed by: OBSTETRICS & GYNECOLOGY

## 2019-08-05 NOTE — PROGRESS NOTES
Assessment/Plan:     No problem-specific Assessment & Plan notes found for this encounter  Diagnoses and all orders for this visit:    Encounter to discuss test results    IUD check up    RTO for annual exam           Subjective:      Patient ID: Robert Reyna is a 50 y o  female presents for EMB results and IUD check up  She has been bleeding continuously since insertion, but not heavily  She is feeling social stress not but declines social work consult today  EMB results were normal and shared with her today  HPI    The following portions of the patient's history were reviewed and updated as appropriate: allergies, current medications, past family history, past medical history, past social history, past surgical history and problem list     Review of Systems      Objective:      /100   Ht 5' (1 524 m)   Wt 61 8 kg (136 lb 3 2 oz)   LMP 06/10/2019 (LMP Unknown) Comment: mirena  BMI 26 60 kg/m²     Final Diagnosis   A  Endometrium, biopsy:             - Early secretory endometrium              - No evidence of chronic endometritis  - No hyperplasia or malignancy is identified  Physical Exam   Constitutional: She is oriented to person, place, and time  She appears well-developed and well-nourished  No distress  Pulmonary/Chest: Effort normal    Genitourinary: There is no rash, tenderness, lesion or injury on the right labia  There is no rash, tenderness, lesion or injury on the left labia  Cervix exhibits no motion tenderness, no discharge and no friability  There is bleeding in the vagina  No erythema or tenderness in the vagina  No foreign body in the vagina  No signs of injury around the vagina  No vaginal discharge found  Genitourinary Comments: IUD strings visualized   Neurological: She is alert and oriented to person, place, and time  Psychiatric: She has a normal mood and affect  Her behavior is normal    Nursing note and vitals reviewed

## 2019-10-03 ENCOUNTER — TELEPHONE (OUTPATIENT)
Dept: OBGYN CLINIC | Facility: CLINIC | Age: 49
End: 2019-10-03

## 2019-10-04 ENCOUNTER — TELEPHONE (OUTPATIENT)
Dept: OBGYN CLINIC | Facility: CLINIC | Age: 49
End: 2019-10-04

## 2019-10-04 DIAGNOSIS — N93.9 ABNORMAL UTERINE BLEEDING (AUB): Primary | ICD-10-CM

## 2019-10-04 RX ORDER — NORETHINDRONE ACETATE AND ETHINYL ESTRADIOL AND FERROUS FUMARATE 1MG-20(24)
1 KIT ORAL DAILY
Qty: 28 TABLET | Refills: 1 | Status: SHIPPED | OUTPATIENT
Start: 2019-10-04 | End: 2019-10-23 | Stop reason: SDUPTHER

## 2019-10-04 NOTE — TELEPHONE ENCOUNTER
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

## 2019-10-23 ENCOUNTER — TELEPHONE (OUTPATIENT)
Dept: HEMATOLOGY ONCOLOGY | Facility: CLINIC | Age: 49
End: 2019-10-23

## 2019-10-23 ENCOUNTER — OFFICE VISIT (OUTPATIENT)
Dept: OBGYN CLINIC | Facility: CLINIC | Age: 49
End: 2019-10-23

## 2019-10-23 ENCOUNTER — APPOINTMENT (OUTPATIENT)
Dept: LAB | Facility: HOSPITAL | Age: 49
End: 2019-10-23
Attending: INTERNAL MEDICINE
Payer: COMMERCIAL

## 2019-10-23 VITALS
HEIGHT: 60 IN | BODY MASS INDEX: 25.72 KG/M2 | SYSTOLIC BLOOD PRESSURE: 120 MMHG | WEIGHT: 131 LBS | DIASTOLIC BLOOD PRESSURE: 70 MMHG

## 2019-10-23 DIAGNOSIS — N93.9 ABNORMAL UTERINE BLEEDING (AUB): ICD-10-CM

## 2019-10-23 DIAGNOSIS — D50.0 IRON DEFICIENCY ANEMIA DUE TO CHRONIC BLOOD LOSS: ICD-10-CM

## 2019-10-23 DIAGNOSIS — N93.9 VAGINAL SPOTTING: Primary | ICD-10-CM

## 2019-10-23 DIAGNOSIS — D47.2 MONOCLONAL GAMMOPATHY: ICD-10-CM

## 2019-10-23 LAB
ALBUMIN SERPL BCP-MCNC: 4.3 G/DL (ref 3–5.2)
ALP SERPL-CCNC: 58 U/L (ref 43–122)
ALT SERPL W P-5'-P-CCNC: 23 U/L (ref 9–52)
ANION GAP SERPL CALCULATED.3IONS-SCNC: 7 MMOL/L (ref 5–14)
AST SERPL W P-5'-P-CCNC: 27 U/L (ref 14–36)
BASOPHILS # BLD AUTO: 0 THOUSANDS/ΜL (ref 0–0.1)
BASOPHILS NFR BLD AUTO: 1 % (ref 0–1)
BILIRUB SERPL-MCNC: 0.4 MG/DL
BUN SERPL-MCNC: 17 MG/DL (ref 5–25)
CALCIUM SERPL-MCNC: 9.1 MG/DL (ref 8.4–10.2)
CHLORIDE SERPL-SCNC: 105 MMOL/L (ref 97–108)
CO2 SERPL-SCNC: 27 MMOL/L (ref 22–30)
CREAT SERPL-MCNC: 0.87 MG/DL (ref 0.6–1.2)
EOSINOPHIL # BLD AUTO: 0.1 THOUSAND/ΜL (ref 0–0.4)
EOSINOPHIL NFR BLD AUTO: 1 % (ref 0–6)
ERYTHROCYTE [DISTWIDTH] IN BLOOD BY AUTOMATED COUNT: 13.8 %
FERRITIN SERPL-MCNC: 9 NG/ML (ref 8–388)
GFR SERPL CREATININE-BSD FRML MDRD: 90 ML/MIN/1.73SQ M
GLUCOSE SERPL-MCNC: 96 MG/DL (ref 70–99)
HCT VFR BLD AUTO: 37.5 % (ref 36–46)
HGB BLD-MCNC: 12.4 G/DL (ref 12–16)
IGA SERPL-MCNC: 242 MG/DL (ref 70–400)
IGG SERPL-MCNC: 1360 MG/DL (ref 700–1600)
IGM SERPL-MCNC: 156 MG/DL (ref 40–230)
IRON SATN MFR SERPL: 23 %
IRON SERPL-MCNC: 93 UG/DL (ref 50–170)
LYMPHOCYTES # BLD AUTO: 1.7 THOUSANDS/ΜL (ref 0.5–4)
LYMPHOCYTES NFR BLD AUTO: 32 % (ref 25–45)
MCH RBC QN AUTO: 30.8 PG (ref 26–34)
MCHC RBC AUTO-ENTMCNC: 33 G/DL (ref 31–36)
MCV RBC AUTO: 93 FL (ref 80–100)
MONOCYTES # BLD AUTO: 0.5 THOUSAND/ΜL (ref 0.2–0.9)
MONOCYTES NFR BLD AUTO: 9 % (ref 1–10)
NEUTROPHILS # BLD AUTO: 3.1 THOUSANDS/ΜL (ref 1.8–7.8)
NEUTS SEG NFR BLD AUTO: 57 % (ref 45–65)
PLATELET # BLD AUTO: 224 THOUSANDS/UL (ref 150–450)
PMV BLD AUTO: 9.1 FL (ref 8.9–12.7)
POTASSIUM SERPL-SCNC: 3.9 MMOL/L (ref 3.6–5)
PROT SERPL-MCNC: 8 G/DL (ref 5.9–8.4)
RBC # BLD AUTO: 4.02 MILLION/UL (ref 4–5.2)
SODIUM SERPL-SCNC: 139 MMOL/L (ref 137–147)
TIBC SERPL-MCNC: 399 UG/DL (ref 250–450)
WBC # BLD AUTO: 5.5 THOUSAND/UL (ref 4.5–11)

## 2019-10-23 PROCEDURE — 80053 COMPREHEN METABOLIC PANEL: CPT

## 2019-10-23 PROCEDURE — 82784 ASSAY IGA/IGD/IGG/IGM EACH: CPT

## 2019-10-23 PROCEDURE — 84165 PROTEIN E-PHORESIS SERUM: CPT

## 2019-10-23 PROCEDURE — 36415 COLL VENOUS BLD VENIPUNCTURE: CPT

## 2019-10-23 PROCEDURE — 83540 ASSAY OF IRON: CPT

## 2019-10-23 PROCEDURE — 82728 ASSAY OF FERRITIN: CPT

## 2019-10-23 PROCEDURE — 99213 OFFICE O/P EST LOW 20 MIN: CPT | Performed by: OBSTETRICS & GYNECOLOGY

## 2019-10-23 PROCEDURE — 85025 COMPLETE CBC W/AUTO DIFF WBC: CPT

## 2019-10-23 PROCEDURE — 83550 IRON BINDING TEST: CPT

## 2019-10-23 PROCEDURE — 83883 ASSAY NEPHELOMETRY NOT SPEC: CPT

## 2019-10-23 RX ORDER — NORETHINDRONE ACETATE AND ETHINYL ESTRADIOL AND FERROUS FUMARATE 1MG-20(24)
1 KIT ORAL DAILY
Qty: 28 TABLET | Refills: 1 | Status: SHIPPED | OUTPATIENT
Start: 2019-10-23 | End: 2020-08-04

## 2019-10-23 NOTE — TELEPHONE ENCOUNTER
I left a message for the patient reminding her to have her lab work done prior to her appt on 10/30/19

## 2019-10-23 NOTE — PROGRESS NOTES
Assessment/Plan:     No problem-specific Assessment & Plan notes found for this encounter  Diagnoses and all orders for this visit:    Vaginal spotting    Abnormal uterine bleeding (AUB)  -     norethindrone-ethinyl estradiol-ferrous fumarate (LOESTIN 24 FE) 1-20 MG-MCG(24) per tablet; Take 1 tablet by mouth daily    RTO for annual exam or prn  Subjective:      Patient ID: Fran Barrett is a 52 y o  female presents with spotting more days than not with her Mirena  She was bothered by the nuisance spotting  The amount of bleeding has improved and is light  She would like to continue on pills for one more month  She has bloodwork pending for Dr Stefan Fermin- reminded her to go  HPI    The following portions of the patient's history were reviewed and updated as appropriate: allergies, current medications, past family history, past medical history, past social history, past surgical history and problem list     Review of Systems      Objective:      /70   Ht 5' (1 524 m)   Wt 59 4 kg (131 lb)   BMI 25 58 kg/m²          Physical Exam   Constitutional: She is oriented to person, place, and time  She appears well-developed and well-nourished  No distress  Pulmonary/Chest: Effort normal    Neurological: She is alert and oriented to person, place, and time  Psychiatric: She has a normal mood and affect  Her behavior is normal    Nursing note and vitals reviewed

## 2019-10-24 LAB
KAPPA LC FREE SER-MCNC: 16.4 MG/L (ref 3.3–19.4)
KAPPA LC FREE/LAMBDA FREE SER: 1.23 {RATIO} (ref 0.26–1.65)
LAMBDA LC FREE SERPL-MCNC: 13.3 MG/L (ref 5.7–26.3)

## 2019-10-25 LAB
ALBUMIN SERPL ELPH-MCNC: 4.25 G/DL (ref 3.5–5)
ALBUMIN SERPL ELPH-MCNC: 55.2 % (ref 52–65)
ALPHA1 GLOB SERPL ELPH-MCNC: 0.31 G/DL (ref 0.1–0.4)
ALPHA1 GLOB SERPL ELPH-MCNC: 4 % (ref 2.5–5)
ALPHA2 GLOB SERPL ELPH-MCNC: 0.8 G/DL (ref 0.4–1.2)
ALPHA2 GLOB SERPL ELPH-MCNC: 10.4 % (ref 7–13)
BETA GLOB ABNORMAL SERPL ELPH-MCNC: 0.48 G/DL (ref 0.4–0.8)
BETA1 GLOB SERPL ELPH-MCNC: 6.2 % (ref 5–13)
BETA2 GLOB SERPL ELPH-MCNC: 5 % (ref 2–8)
BETA2+GAMMA GLOB SERPL ELPH-MCNC: 0.39 G/DL (ref 0.2–0.5)
GAMMA GLOB ABNORMAL SERPL ELPH-MCNC: 1.48 G/DL (ref 0.5–1.6)
GAMMA GLOB SERPL ELPH-MCNC: 19.2 % (ref 12–22)
IGG/ALB SER: 1.23 {RATIO} (ref 1.1–1.8)
KAPPA LC UR-MCNC: 147 MG/L (ref 1.35–24.19)
KAPPA LC/LAMBDA UR: 26.25 {RATIO} (ref 2.04–10.37)
LAMBDA LC UR-MCNC: 5.6 MG/L (ref 0.24–6.66)
M PROTEIN 1 MFR SERPL ELPH: 5.1 %
M PROTEIN 1 SERPL ELPH-MCNC: 0.39 G/DL
PROT PATTERN SERPL ELPH-IMP: NORMAL
PROT SERPL-MCNC: 7.7 G/DL (ref 6.4–8.2)

## 2019-10-30 ENCOUNTER — OFFICE VISIT (OUTPATIENT)
Dept: HEMATOLOGY ONCOLOGY | Facility: CLINIC | Age: 49
End: 2019-10-30
Payer: COMMERCIAL

## 2019-10-30 VITALS
OXYGEN SATURATION: 98 % | HEART RATE: 67 BPM | SYSTOLIC BLOOD PRESSURE: 112 MMHG | TEMPERATURE: 100.2 F | RESPIRATION RATE: 14 BRPM | WEIGHT: 129.6 LBS | BODY MASS INDEX: 25.45 KG/M2 | DIASTOLIC BLOOD PRESSURE: 84 MMHG | HEIGHT: 60 IN

## 2019-10-30 DIAGNOSIS — D47.2 MONOCLONAL GAMMOPATHY: Primary | ICD-10-CM

## 2019-10-30 DIAGNOSIS — D50.0 IRON DEFICIENCY ANEMIA DUE TO CHRONIC BLOOD LOSS: ICD-10-CM

## 2019-10-30 PROCEDURE — 99214 OFFICE O/P EST MOD 30 MIN: CPT | Performed by: INTERNAL MEDICINE

## 2019-10-30 NOTE — PROGRESS NOTES
Hematology/Oncology Outpatient Follow-up  Kathleen Apple 52 y o  female 1970 [id number]    Date:  10/30/2019    Assessment and Plan:  1  Monoclonal gammopathy  Patient was educated about the high IgG kappa monoclonal protein seen in the urine  She does seem to have monoclonal protein which needs to be further investigated  We discussed the need for bone marrow biopsy in the near future  The plan is to pursue another measurement of her protein prior to her next visit in about 3 months from now, she will then get a bone marrow biopsy around that time for further evaluation according to her results  - CBC and differential; Future  - Comprehensive metabolic panel; Future  - Beta 2 microglobulin, serum; Future  - IgG, IgA, IgM; Future  - Immunoglobulin free LT chains blood; Future  - Kappa/Lambda Light Chains Free With Ratio,Urine; Future  - Protein, urine, 24 hour; Future  - Protein electrophoresis, serum; Future  - Protein electrophoresis, urine; Future  - IMMUNOFIXATION (ADAMA) AND PROTEIN ELECTROPHORESIS, RANDOM URINE; Future  - Protein, Total and Protein Electrophoresis with Immunofixation; Future    2  Iron deficiency anemia due to chronic blood loss  Patient continues to be iron deficient with a ferritin around 9  She stated that she is interested in going back on oral iron supplements and not interested in iron IV at this point in time which seems to be appropriate  She will be approach next visit to pursue extensive GI evaluation since she is almost 48years old  - CBC and differential; Future  - Comprehensive metabolic panel; Future  - Iron Panel (Includes Ferritin, Iron Sat%, Iron, and TIBC); Future  - Ferritin; Future  - Vitamin B12; Future      HPI:  This is a 79-year-old female with a history of bradycardia, hypotension, and 6 pregnancies with 5 full-term deliveries    The patient also seems to have chronic anemia with iron deficiency which is being treated with the oral iron supplements   She also had extensive workup by her PCP including a serum protein electrophoresis which showed an M spike   The serum immunofixation showed IgG kappa monoclonal gammopathy at the 0 5 grams/deciliter   The UPEP was negative for monoclonal protein  Interval history:  Patient came today for a follow-up visit  She had recent blood work on 10/23/2019 which show showed normal immunoglobulin levels the SPEP was abnormal with the IgG kappa monoclonal protein on the serum immunofixation  The urine light chain showed a high kappa concentration of 147 milligram/liter with kappa to lambda ratio of 26  Her CBC showed normal hemoglobin level of 12 4 with MCV of 93 her white cells and platelets were within normal range  The creatinine was 0 8 with calcium of 9 1 and normal liver enzymes  Her iron panel showed a ferritin of 9 with saturation of 23%  The patient denies bleeding from any sites  She does not seem to have swelling of her extremities like before  ROS: Review of Systems   Constitutional: Negative for activity change, appetite change, chills, diaphoresis, fatigue, fever and unexpected weight change  HENT: Negative for congestion, dental problem, ear discharge, ear pain, facial swelling, hearing loss, mouth sores, nosebleeds, postnasal drip, sore throat, tinnitus and trouble swallowing  Eyes: Negative for discharge, redness, itching and visual disturbance  Respiratory: Negative for cough, chest tightness, shortness of breath and wheezing  Cardiovascular: Negative for chest pain, palpitations and leg swelling  Gastrointestinal: Negative for abdominal distention, abdominal pain, anal bleeding, blood in stool, constipation, diarrhea, nausea and vomiting  Genitourinary: Negative for difficulty urinating, dysuria, flank pain, frequency, hematuria and urgency  Musculoskeletal: Positive for myalgias  Negative for arthralgias, back pain, gait problem, joint swelling and neck pain     Skin: Negative for color change, pallor, rash and wound  Neurological: Positive for headaches  Negative for dizziness, syncope, speech difficulty, weakness, light-headedness and numbness  Hematological: Negative for adenopathy  Does not bruise/bleed easily  Psychiatric/Behavioral: Negative for agitation, behavioral problems, confusion and sleep disturbance  Past Medical History:   Diagnosis Date    Bradycardia     Depression     Hypotension        Past Surgical History:   Procedure Laterality Date    HEMORROIDECTOMY      WRIST SURGERY Right        Social History     Socioeconomic History    Marital status:      Spouse name: None    Number of children: None    Years of education: None    Highest education level: None   Occupational History    None   Social Needs    Financial resource strain: None    Food insecurity:     Worry: None     Inability: None    Transportation needs:     Medical: None     Non-medical: None   Tobacco Use    Smoking status: Never Smoker    Smokeless tobacco: Never Used   Substance and Sexual Activity    Alcohol use:  Yes     Alcohol/week: 1 0 standard drinks     Types: 1 Glasses of wine per week     Comment: social    Drug use: No    Sexual activity: Yes     Partners: Male     Birth control/protection: IUD   Lifestyle    Physical activity:     Days per week: None     Minutes per session: None    Stress: None   Relationships    Social connections:     Talks on phone: None     Gets together: None     Attends Restorationism service: None     Active member of club or organization: None     Attends meetings of clubs or organizations: None     Relationship status: None    Intimate partner violence:     Fear of current or ex partner: None     Emotionally abused: None     Physically abused: None     Forced sexual activity: None   Other Topics Concern    None   Social History Narrative    None       Family History   Problem Relation Age of Onset    Hypertension Maternal Grandmother  Diabetes Paternal Grandmother     No Known Problems Mother     No Known Problems Father     No Known Problems Sister     No Known Problems Daughter     No Known Problems Maternal Grandfather     No Known Problems Paternal Grandfather     No Known Problems Sister     No Known Problems Son     No Known Problems Son     No Known Problems Son     No Known Problems Son        No Known Allergies      Current Outpatient Medications:     norethindrone-ethinyl estradiol-ferrous fumarate (LOESTIN 24 FE) 1-20 MG-MCG(24) per tablet, Take 1 tablet by mouth daily, Disp: 28 tablet, Rfl: 1    fluticasone (FLONASE) 50 mcg/act nasal spray, 2 sprays into each nostril daily, Disp: , Rfl:     loratadine (CLARITIN) 10 mg tablet, Take 1 tablet (10 mg total) by mouth daily Take 1 tablet daily p o  (Patient taking differently: Take 10 mg by mouth daily as needed ), Disp: 30 tablet, Rfl: 0      Physical Exam:  /84 (BP Location: Left arm, Patient Position: Sitting, Cuff Size: Adult)   Pulse 67   Temp 100 2 °F (37 9 °C) (Tympanic)   Resp 14   Ht 5' (1 524 m)   Wt 58 8 kg (129 lb 9 6 oz)   SpO2 98%   BMI 25 31 kg/m²     Physical Exam   Constitutional: She is oriented to person, place, and time  She appears well-developed and well-nourished  No distress  HENT:   Head: Normocephalic and atraumatic  Nose: Nose normal    Mouth/Throat: Oropharynx is clear and moist    Eyes: Pupils are equal, round, and reactive to light  Conjunctivae and EOM are normal  Right eye exhibits no discharge  Left eye exhibits no discharge  No scleral icterus  Neck: Normal range of motion  Neck supple  No JVD present  No tracheal deviation present  No thyromegaly present  Cardiovascular: Normal rate, regular rhythm and normal heart sounds  Exam reveals no friction rub  No murmur heard  Pulmonary/Chest: Effort normal and breath sounds normal  No stridor  No respiratory distress  She has no wheezes  She has no rales   She exhibits no tenderness  Abdominal: Soft  Bowel sounds are normal  She exhibits no distension and no mass  There is no hepatosplenomegaly, splenomegaly or hepatomegaly  There is no tenderness  There is no rebound and no guarding  Musculoskeletal: Normal range of motion  She exhibits no edema, tenderness or deformity  Lymphadenopathy:     She has no cervical adenopathy  Neurological: She is alert and oriented to person, place, and time  She has normal reflexes  No cranial nerve deficit  Coordination normal    Skin: Skin is warm and dry  No rash noted  She is not diaphoretic  No erythema  No pallor  Psychiatric: She has a normal mood and affect  Her behavior is normal  Judgment and thought content normal          Labs:  Lab Results   Component Value Date    WBC 5 50 10/23/2019    HGB 12 4 10/23/2019    HCT 37 5 10/23/2019    MCV 93 10/23/2019     10/23/2019     Lab Results   Component Value Date    K 3 9 10/23/2019     10/23/2019    CO2 27 10/23/2019    BUN 17 10/23/2019    CREATININE 0 87 10/23/2019    GLUF 91 02/01/2019    CALCIUM 9 1 10/23/2019    AST 27 10/23/2019    ALT 23 10/23/2019    ALKPHOS 58 10/23/2019    EGFR 90 10/23/2019       Patient voiced understanding and agreement in the above discussion  Aware to contact our office with questions/symptoms in the interim

## 2020-01-28 ENCOUNTER — TELEPHONE (OUTPATIENT)
Dept: HEMATOLOGY ONCOLOGY | Facility: CLINIC | Age: 50
End: 2020-01-28

## 2020-02-04 ENCOUNTER — TELEPHONE (OUTPATIENT)
Dept: HEMATOLOGY ONCOLOGY | Facility: CLINIC | Age: 50
End: 2020-02-04

## 2020-02-04 NOTE — TELEPHONE ENCOUNTER
Patient called to reschedule appointment with Dr Ankita Dumont appointment date and time: 02/05 at 4:00pm  Appointment rescheduled to:02/19 at 2:40pm   Location:  [facility]   Patient verbalized understanding of above

## 2020-02-11 ENCOUNTER — TELEPHONE (OUTPATIENT)
Dept: HEMATOLOGY ONCOLOGY | Facility: CLINIC | Age: 50
End: 2020-02-11

## 2020-02-18 ENCOUNTER — TELEPHONE (OUTPATIENT)
Dept: HEMATOLOGY ONCOLOGY | Facility: CLINIC | Age: 50
End: 2020-02-18

## 2020-03-19 ENCOUNTER — OFFICE VISIT (OUTPATIENT)
Dept: FAMILY MEDICINE CLINIC | Facility: CLINIC | Age: 50
End: 2020-03-19
Payer: COMMERCIAL

## 2020-03-19 VITALS
DIASTOLIC BLOOD PRESSURE: 81 MMHG | HEART RATE: 69 BPM | HEIGHT: 61 IN | WEIGHT: 128.6 LBS | BODY MASS INDEX: 24.28 KG/M2 | SYSTOLIC BLOOD PRESSURE: 122 MMHG | TEMPERATURE: 97.8 F | OXYGEN SATURATION: 99 %

## 2020-03-19 DIAGNOSIS — R09.81 CHRONIC NASAL CONGESTION: Primary | ICD-10-CM

## 2020-03-19 DIAGNOSIS — D47.2 MONOCLONAL GAMMOPATHY: ICD-10-CM

## 2020-03-19 DIAGNOSIS — K52.9 CHRONIC DIARRHEA: ICD-10-CM

## 2020-03-19 DIAGNOSIS — M25.552 LEFT HIP PAIN: ICD-10-CM

## 2020-03-19 DIAGNOSIS — E61.1 LOW IRON: ICD-10-CM

## 2020-03-19 DIAGNOSIS — R76.8 POSITIVE ANA (ANTINUCLEAR ANTIBODY): ICD-10-CM

## 2020-03-19 DIAGNOSIS — M54.50 ACUTE MIDLINE LOW BACK PAIN WITHOUT SCIATICA: ICD-10-CM

## 2020-03-19 DIAGNOSIS — E78.00 PURE HYPERCHOLESTEROLEMIA: ICD-10-CM

## 2020-03-19 DIAGNOSIS — R94.31 ABNORMAL EKG: ICD-10-CM

## 2020-03-19 DIAGNOSIS — I10 ESSENTIAL HYPERTENSION: ICD-10-CM

## 2020-03-19 DIAGNOSIS — D72.819 LEUKOPENIA, UNSPECIFIED TYPE: ICD-10-CM

## 2020-03-19 DIAGNOSIS — D64.9 ANEMIA, UNSPECIFIED TYPE: ICD-10-CM

## 2020-03-19 DIAGNOSIS — R06.83 SNORING: ICD-10-CM

## 2020-03-19 PROBLEM — T78.40XA ALLERGIES: Status: ACTIVE | Noted: 2020-03-19

## 2020-03-19 PROCEDURE — 99214 OFFICE O/P EST MOD 30 MIN: CPT | Performed by: FAMILY MEDICINE

## 2020-03-19 PROCEDURE — 3079F DIAST BP 80-89 MM HG: CPT | Performed by: FAMILY MEDICINE

## 2020-03-19 PROCEDURE — 3008F BODY MASS INDEX DOCD: CPT | Performed by: FAMILY MEDICINE

## 2020-03-19 PROCEDURE — 3074F SYST BP LT 130 MM HG: CPT | Performed by: FAMILY MEDICINE

## 2020-03-19 PROCEDURE — 1036F TOBACCO NON-USER: CPT | Performed by: FAMILY MEDICINE

## 2020-03-19 RX ORDER — ACETAMINOPHEN 500 MG
1000 TABLET ORAL EVERY 8 HOURS PRN
Qty: 30 TABLET | Refills: 0 | COMMUNITY
Start: 2020-03-19 | End: 2020-08-04

## 2020-03-19 NOTE — PROGRESS NOTES
Assessment/Plan:       No problem-specific Assessment & Plan notes found for this encounter  Diagnoses and all orders for this visit:    Chronic nasal congestion  -     Ambulatory Referral to Otolaryngology; Future    Snoring  -     Ambulatory Referral to Otolaryngology; Future    Leukopenia, unspecified type  Comments: To follow with Dr Marcella Castillo    Anemia, unspecified type  Comments: To follow with Dr Marcella Castillo    Monoclonal gammopathy  Comments: To follow with Dr Marcella Castillo    Low iron  Comments:  corrected    Positive GONZALO (antinuclear antibody)  -     GONZALO Screen w/ Reflex to Titer/Pattern; Future    Essential hypertension  Comments:  Controlled  To follow with the dash diet    Pure hypercholesterolemia  Comments: To follow with low-fat  Orders:  -     Lipid Panel with Direct LDL reflex; Future    Abnormal EKG  -     Echo complete with contrast if indicated; Future    Left hip pain  -     XR hip/pelv 2-3 vws left if performed; Future  -     XR spine lumbar minimum 4 views non injury; Future  -     acetaminophen (TYLENOL) 500 mg tablet; Take 2 tablets (1,000 mg total) by mouth every 8 (eight) hours as needed for mild pain    Acute midline low back pain without sciatica  -     UA w Reflex to Microscopic w Reflex to Culture  -     acetaminophen (TYLENOL) 500 mg tablet; Take 2 tablets (1,000 mg total) by mouth every 8 (eight) hours as needed for mild pain    Chronic diarrhea  -     Amylase; Future  -     Lipase; Future  -     Clostridium difficile toxin by PCR with EIA; Future  -     Giardia antigen; Future  -     Stool Enteric Bacterial Panel by PCR; Future  -     Ova and parasite examination;  Future        Patient Instructions   To follow up with test results      Orders Placed This Encounter   Procedures    Clostridium difficile toxin by PCR with EIA     This is a patient instruction: Inform the lab staff if you have diarrhea or are using antibiotics, barium, bismuth, oil, iron, magnesium or medications to stop diarrhea  Avoid adding urine, water, tissues or toilet paper to the stool sample  FORMED stools are not acceptable  Standing Status:   Future     Standing Expiration Date:   3/19/2021    Giardia antigen     Standing Status:   Future     Standing Expiration Date:   3/19/2021    Stool Enteric Bacterial Panel by PCR     Standing Status:   Future     Standing Expiration Date:   3/19/2021    Ova and parasite examination     Standing Status:   Future     Standing Expiration Date:   3/19/2021    XR hip/pelv 2-3 vws left if performed     Standing Status:   Future     Standing Expiration Date:   3/19/2024     Scheduling Instructions:      Bring along any outside films relating to this procedure  Order Specific Question:   Is the patient pregnant? Answer:   Unknown    XR spine lumbar minimum 4 views non injury     Standing Status:   Future     Standing Expiration Date:   3/19/2024     Scheduling Instructions:      Bring along any outside films relating to this procedure  Order Specific Question:   Is the patient pregnant? Answer:   Unknown    Lipid Panel with Direct LDL reflex     This is a patient instruction: This test requires patient fasting for 10-12 hours or longer  Drinking of black coffee or black tea is acceptable       Standing Status:   Future     Standing Expiration Date:   3/19/2021    GONZALO Screen w/ Reflex to Titer/Pattern     Standing Status:   Future     Standing Expiration Date:   3/19/2021    Amylase     Standing Status:   Future     Standing Expiration Date:   3/19/2021    Lipase     Standing Status:   Future     Standing Expiration Date:   3/19/2021    UA w Reflex to Microscopic w Reflex to Culture    Ambulatory Referral to Otolaryngology     Standing Status:   Future     Standing Expiration Date:   3/19/2021     Referral Priority:   Routine     Referral Type:   Consult - AMB     Referral Reason:   Specialty Services Required     Referred to Provider:   Sade Brody Susanne Oliver DO     Requested Specialty:   Otolaryngology     Number of Visits Requested:   1     Expiration Date:   3/19/2021    Echo complete with contrast if indicated     Standing Status:   Future     Standing Expiration Date:   3/19/2024         Subjective:     Patient ID: Jolene Abel is a 52 y o  female      Reestablish   Nasal congestion x years  With clear postnasal drainage  Has tried multiple nasal spray and oral medication did not work  She was referred to ENT but did not keep the appointment  Denied sore throat, cough or wheeze, also admit to chronic snoring  Diarrhea  started 6 years every day but it varies from day to another day   oneday she will have one loose bowel movement ,sometimes have lose 3 bowel movement a day  Denied nausea, vomiting or abdominal pain denied rectal bleeding  Hip pain  Left side  Started 2 months  Mild  Only with walking  Denied swelling or redness of the joint  Denied injury  Back pain  Patient complaining of lower back pain for 3 days  Mild  Off and  on  Does not radiate  It is dull  Denied injury  Denied problem with urination  Leukopenia  Has been following with Dr Flaco Cruz  But did not have a blood test ordered by him yet  Denied weight loss or feeling tired  Positive GONZALO  Denied myalgia  Low iron  Is following with Dr Flaco Cruz, did not follow with GI  Lab done on October 29, 2019  EKG done February 2019   reviewed and discussed result with      Review of Systems   Constitutional: Negative for activity change, appetite change, chills, fatigue, fever and unexpected weight change  HENT: Negative for ear discharge, ear pain, hearing loss, nosebleeds, rhinorrhea, sinus pressure, sore throat, tinnitus, trouble swallowing and voice change  Eyes: Negative for photophobia, pain and visual disturbance  Respiratory: Negative for cough, chest tightness, shortness of breath and wheezing      Cardiovascular: Negative for chest pain, palpitations and leg swelling  Gastrointestinal: Negative for abdominal pain, anal bleeding, blood in stool, constipation, nausea, rectal pain and vomiting  Endocrine: Negative for cold intolerance, heat intolerance, polydipsia and polyuria  Genitourinary: Negative for dysuria, frequency, hematuria and urgency  Musculoskeletal: Negative for back pain, gait problem, joint swelling, myalgias and neck pain  Skin: Negative for rash  Neurological: Negative for dizziness, tremors, seizures, syncope, weakness, light-headedness and headaches  Hematological: Negative for adenopathy  Does not bruise/bleed easily  Psychiatric/Behavioral: Negative for agitation, behavioral problems, confusion, dysphoric mood, hallucinations and sleep disturbance  The patient is not nervous/anxious  Objective:     Physical Exam   Constitutional: She is oriented to person, place, and time  She appears well-developed and well-nourished  No distress  HENT:   Head: Normocephalic and atraumatic  Right Ear: External ear normal    Left Ear: External ear normal    Nose: Nose normal    Mouth/Throat: Oropharynx is clear and moist  No oropharyngeal exudate  Nose  Mucosa is normal color  Positive moderate enlarged turbinates bilaterally  Tympanic membrane are normal bilaterally   Eyes: Pupils are equal, round, and reactive to light  Conjunctivae and EOM are normal  No scleral icterus  Neck: Neck supple  No JVD present  No thyromegaly present  Cardiovascular: Normal rate, regular rhythm and normal heart sounds  No murmur heard  Pulses:       Carotid pulses are 3+ on the right side, and 3+ on the left side  Dorsalis pedis pulses are 3+ on the right side, and 3+ on the left side  Posterior tibial pulses are 3+ on the right side, and 3+ on the left side  Pulmonary/Chest: Effort normal and breath sounds normal  No stridor  No respiratory distress  She has no wheezes  She has no rales  Abdominal: Soft   Bowel sounds are normal  She exhibits no distension and no mass  There is no tenderness  There is no rebound and no guarding  No hernia  Musculoskeletal: Normal range of motion  She exhibits no edema or deformity  Hips, no tenderness no swelling no redness has full range of motion  Back no spine or muscle tenderness  Has good range of motion  Negative leg raising bilaterally  Lymphadenopathy:     She has no cervical adenopathy  Neurological: She is alert and oriented to person, place, and time  She displays normal reflexes  No cranial nerve deficit or sensory deficit  She exhibits normal muscle tone  Coordination normal    Gait is normal     Skin: No rash noted  She is not diaphoretic  Psychiatric: She has a normal mood and affect   Her behavior is normal  Judgment and thought content normal

## 2020-03-23 ENCOUNTER — HOSPITAL ENCOUNTER (OUTPATIENT)
Dept: RADIOLOGY | Facility: HOSPITAL | Age: 50
Discharge: HOME | End: 2020-03-23
Attending: FAMILY MEDICINE
Payer: COMMERCIAL

## 2020-03-23 ENCOUNTER — APPOINTMENT (OUTPATIENT)
Dept: LAB | Facility: HOSPITAL | Age: 50
End: 2020-03-23
Attending: INTERNAL MEDICINE
Payer: COMMERCIAL

## 2020-03-23 DIAGNOSIS — K52.9 CHRONIC DIARRHEA: ICD-10-CM

## 2020-03-23 DIAGNOSIS — M25.552 LEFT HIP PAIN: ICD-10-CM

## 2020-03-23 DIAGNOSIS — E78.00 PURE HYPERCHOLESTEROLEMIA: ICD-10-CM

## 2020-03-23 DIAGNOSIS — R76.8 POSITIVE ANA (ANTINUCLEAR ANTIBODY): ICD-10-CM

## 2020-03-23 LAB
AMYLASE SERPL-CCNC: 130 IU/L (ref 30–110)
BACTERIA UR QL AUTO: ABNORMAL /HPF
BILIRUB UR QL STRIP: NEGATIVE
CHOLEST SERPL-MCNC: 219 MG/DL
CLARITY UR: ABNORMAL
COLOR UR: YELLOW
GLUCOSE UR STRIP-MCNC: NEGATIVE MG/DL
HDLC SERPL-MCNC: 59 MG/DL
HGB UR QL STRIP.AUTO: 25
KETONES UR STRIP-MCNC: NEGATIVE MG/DL
LDLC SERPL CALC-MCNC: 140 MG/DL
LEUKOCYTE ESTERASE UR QL STRIP: NEGATIVE
LIPASE SERPL-CCNC: 40 U/L (ref 23–300)
MUCOUS THREADS UR QL AUTO: ABNORMAL
NITRITE UR QL STRIP: NEGATIVE
NON-SQ EPI CELLS URNS QL MICRO: ABNORMAL /HPF
PH UR STRIP.AUTO: 6 [PH]
PROT UR STRIP-MCNC: NEGATIVE MG/DL
RBC #/AREA URNS AUTO: ABNORMAL /HPF
SP GR UR STRIP.AUTO: 1.02 (ref 1–1.04)
TRIGL SERPL-MCNC: 98 MG/DL
UROBILINOGEN UA: NEGATIVE MG/DL
WBC #/AREA URNS AUTO: ABNORMAL /HPF

## 2020-03-23 PROCEDURE — 84166 PROTEIN E-PHORESIS/URINE/CSF: CPT | Performed by: PATHOLOGY

## 2020-03-23 PROCEDURE — 84165 PROTEIN E-PHORESIS SERUM: CPT | Performed by: PATHOLOGY

## 2020-03-23 PROCEDURE — 86038 ANTINUCLEAR ANTIBODIES: CPT

## 2020-03-23 PROCEDURE — 86334 IMMUNOFIX E-PHORESIS SERUM: CPT | Performed by: PATHOLOGY

## 2020-03-23 PROCEDURE — 80061 LIPID PANEL: CPT

## 2020-03-23 PROCEDURE — 81003 URINALYSIS AUTO W/O SCOPE: CPT | Performed by: FAMILY MEDICINE

## 2020-03-23 PROCEDURE — 82150 ASSAY OF AMYLASE: CPT

## 2020-03-23 PROCEDURE — 81001 URINALYSIS AUTO W/SCOPE: CPT | Performed by: FAMILY MEDICINE

## 2020-03-23 PROCEDURE — 72110 X-RAY EXAM L-2 SPINE 4/>VWS: CPT

## 2020-03-23 PROCEDURE — 73502 X-RAY EXAM HIP UNI 2-3 VIEWS: CPT

## 2020-03-23 PROCEDURE — 83690 ASSAY OF LIPASE: CPT

## 2020-03-23 PROCEDURE — 86039 ANTINUCLEAR ANTIBODIES (ANA): CPT

## 2020-03-23 PROCEDURE — 36415 COLL VENOUS BLD VENIPUNCTURE: CPT

## 2020-03-24 DIAGNOSIS — R74.8 ELEVATED AMYLASE: Primary | ICD-10-CM

## 2020-03-24 DIAGNOSIS — K52.9 CHRONIC DIARRHEA: ICD-10-CM

## 2020-03-25 ENCOUNTER — TELEPHONE (OUTPATIENT)
Dept: FAMILY MEDICINE CLINIC | Facility: CLINIC | Age: 50
End: 2020-03-25

## 2020-03-25 DIAGNOSIS — R82.90 ABNORMAL URINE FINDINGS: ICD-10-CM

## 2020-03-25 DIAGNOSIS — R74.8 ELEVATED AMYLASE: Primary | ICD-10-CM

## 2020-03-25 LAB
ANA HOMOGEN SER QL IF: NORMAL
ANA HOMOGEN TITR SER: NORMAL {TITER}
RYE IGE QN: POSITIVE

## 2020-03-25 NOTE — TELEPHONE ENCOUNTER
----- Message from Nikolay Corley MD sent at 3/23/2020  6:03 PM EDT -----  Total cholesterol 219   to recommend medication  To follow with low-fat  Amylase is slightly above normal but lipase is normal check lipase and amylase in 3 days

## 2020-04-03 ENCOUNTER — TELEPHONE (OUTPATIENT)
Dept: HEMATOLOGY ONCOLOGY | Facility: CLINIC | Age: 50
End: 2020-04-03

## 2020-04-06 ENCOUNTER — TELEPHONE (OUTPATIENT)
Dept: HEMATOLOGY ONCOLOGY | Facility: CLINIC | Age: 50
End: 2020-04-06

## 2020-04-06 ENCOUNTER — TELEPHONE (OUTPATIENT)
Dept: HEMATOLOGY ONCOLOGY | Facility: MEDICAL CENTER | Age: 50
End: 2020-04-06

## 2020-04-06 ENCOUNTER — TELEMEDICINE (OUTPATIENT)
Dept: HEMATOLOGY ONCOLOGY | Facility: CLINIC | Age: 50
End: 2020-04-06
Payer: COMMERCIAL

## 2020-04-06 DIAGNOSIS — D50.0 IRON DEFICIENCY ANEMIA DUE TO CHRONIC BLOOD LOSS: ICD-10-CM

## 2020-04-06 DIAGNOSIS — D47.2 MONOCLONAL GAMMOPATHY: Primary | ICD-10-CM

## 2020-04-06 PROCEDURE — 99214 OFFICE O/P EST MOD 30 MIN: CPT | Performed by: INTERNAL MEDICINE

## 2020-04-06 PROCEDURE — 3079F DIAST BP 80-89 MM HG: CPT | Performed by: INTERNAL MEDICINE

## 2020-04-06 PROCEDURE — 3074F SYST BP LT 130 MM HG: CPT | Performed by: INTERNAL MEDICINE

## 2020-04-06 PROCEDURE — 1036F TOBACCO NON-USER: CPT | Performed by: INTERNAL MEDICINE

## 2020-04-22 ENCOUNTER — TELEPHONE (OUTPATIENT)
Dept: FAMILY MEDICINE CLINIC | Facility: CLINIC | Age: 50
End: 2020-04-22

## 2020-04-23 DIAGNOSIS — R74.8 ELEVATED AMYLASE: Primary | ICD-10-CM

## 2020-04-23 DIAGNOSIS — R76.8 ANA POSITIVE: ICD-10-CM

## 2020-06-01 ENCOUNTER — APPOINTMENT (OUTPATIENT)
Dept: LAB | Facility: HOSPITAL | Age: 50
End: 2020-06-01
Attending: FAMILY MEDICINE
Payer: COMMERCIAL

## 2020-06-01 DIAGNOSIS — R74.8 ELEVATED AMYLASE: ICD-10-CM

## 2020-06-01 DIAGNOSIS — K52.9 CHRONIC DIARRHEA: ICD-10-CM

## 2020-06-01 DIAGNOSIS — R82.90 ABNORMAL URINE FINDINGS: ICD-10-CM

## 2020-06-01 DIAGNOSIS — R76.8 ANA POSITIVE: ICD-10-CM

## 2020-06-01 LAB
AMYLASE SERPL-CCNC: 81 IU/L (ref 30–110)
LIPASE SERPL-CCNC: 35 U/L (ref 23–300)

## 2020-06-01 PROCEDURE — 83690 ASSAY OF LIPASE: CPT

## 2020-06-01 PROCEDURE — 86225 DNA ANTIBODY NATIVE: CPT

## 2020-06-01 PROCEDURE — 87505 NFCT AGENT DETECTION GI: CPT

## 2020-06-01 PROCEDURE — 87209 SMEAR COMPLEX STAIN: CPT

## 2020-06-01 PROCEDURE — 87177 OVA AND PARASITES SMEARS: CPT

## 2020-06-01 PROCEDURE — 83516 IMMUNOASSAY NONANTIBODY: CPT

## 2020-06-01 PROCEDURE — 82150 ASSAY OF AMYLASE: CPT

## 2020-06-01 PROCEDURE — 87086 URINE CULTURE/COLONY COUNT: CPT

## 2020-06-01 PROCEDURE — 86235 NUCLEAR ANTIGEN ANTIBODY: CPT

## 2020-06-01 PROCEDURE — 36415 COLL VENOUS BLD VENIPUNCTURE: CPT

## 2020-06-02 LAB
BACTERIA UR CULT: NORMAL
C DIFF TOX GENS STL QL NAA+PROBE: NEGATIVE
CAMPYLOBACTER DNA SPEC NAA+PROBE: NORMAL
SALMONELLA DNA SPEC QL NAA+PROBE: NORMAL
SHIGA TOXIN STX GENE SPEC NAA+PROBE: NORMAL
SHIGELLA DNA SPEC QL NAA+PROBE: NORMAL

## 2020-06-03 ENCOUNTER — APPOINTMENT (OUTPATIENT)
Dept: LAB | Facility: HOSPITAL | Age: 50
End: 2020-06-03
Payer: COMMERCIAL

## 2020-06-03 ENCOUNTER — TRANSCRIBE ORDERS (OUTPATIENT)
Dept: LAB | Facility: HOSPITAL | Age: 50
End: 2020-06-03

## 2020-06-03 ENCOUNTER — OFFICE VISIT (OUTPATIENT)
Dept: FAMILY MEDICINE CLINIC | Facility: CLINIC | Age: 50
End: 2020-06-03
Payer: COMMERCIAL

## 2020-06-03 VITALS
SYSTOLIC BLOOD PRESSURE: 124 MMHG | BODY MASS INDEX: 24.17 KG/M2 | WEIGHT: 128 LBS | TEMPERATURE: 97.1 F | HEART RATE: 50 BPM | DIASTOLIC BLOOD PRESSURE: 80 MMHG | HEIGHT: 61 IN | OXYGEN SATURATION: 99 %

## 2020-06-03 DIAGNOSIS — R00.1 BRADYCARDIA: Primary | ICD-10-CM

## 2020-06-03 DIAGNOSIS — M25.552 LEFT HIP PAIN: ICD-10-CM

## 2020-06-03 DIAGNOSIS — N62 HYPERTROPHY OF BREAST: Primary | ICD-10-CM

## 2020-06-03 DIAGNOSIS — R76.8 ANA POSITIVE: ICD-10-CM

## 2020-06-03 DIAGNOSIS — N62 HYPERTROPHY OF BREAST: ICD-10-CM

## 2020-06-03 DIAGNOSIS — I10 ESSENTIAL HYPERTENSION: ICD-10-CM

## 2020-06-03 DIAGNOSIS — Z11.4 ENCOUNTER FOR SCREENING FOR HIV: ICD-10-CM

## 2020-06-03 DIAGNOSIS — K52.9 CHRONIC DIARRHEA: ICD-10-CM

## 2020-06-03 DIAGNOSIS — E78.00 PURE HYPERCHOLESTEROLEMIA: ICD-10-CM

## 2020-06-03 DIAGNOSIS — D47.2 MONOCLONAL GAMMOPATHY: ICD-10-CM

## 2020-06-03 DIAGNOSIS — R74.8 ELEVATED AMYLASE: ICD-10-CM

## 2020-06-03 LAB
ANION GAP SERPL CALCULATED.3IONS-SCNC: 6 MMOL/L (ref 5–14)
BUN SERPL-MCNC: 12 MG/DL (ref 5–25)
CALCIUM SERPL-MCNC: 9 MG/DL (ref 8.4–10.2)
CHLORIDE SERPL-SCNC: 107 MMOL/L (ref 97–108)
CO2 SERPL-SCNC: 25 MMOL/L (ref 22–30)
CREAT SERPL-MCNC: 0.7 MG/DL (ref 0.6–1.2)
G LAMBLIA AG STL QL IA: NEGATIVE
GFR SERPL CREATININE-BSD FRML MDRD: 118 ML/MIN/1.73SQ M
GLUCOSE SERPL-MCNC: 97 MG/DL (ref 70–99)
POTASSIUM SERPL-SCNC: 4.1 MMOL/L (ref 3.6–5)
SODIUM SERPL-SCNC: 138 MMOL/L (ref 137–147)

## 2020-06-03 PROCEDURE — 99214 OFFICE O/P EST MOD 30 MIN: CPT | Performed by: FAMILY MEDICINE

## 2020-06-03 PROCEDURE — 1036F TOBACCO NON-USER: CPT | Performed by: FAMILY MEDICINE

## 2020-06-03 PROCEDURE — 3079F DIAST BP 80-89 MM HG: CPT | Performed by: FAMILY MEDICINE

## 2020-06-03 PROCEDURE — 3074F SYST BP LT 130 MM HG: CPT | Performed by: FAMILY MEDICINE

## 2020-06-03 PROCEDURE — 80048 BASIC METABOLIC PNL TOTAL CA: CPT

## 2020-06-03 PROCEDURE — 36415 COLL VENOUS BLD VENIPUNCTURE: CPT

## 2020-06-03 PROCEDURE — 3008F BODY MASS INDEX DOCD: CPT | Performed by: FAMILY MEDICINE

## 2020-06-04 LAB — O+P STL CONC: NORMAL

## 2020-06-11 LAB — MISCELLANEOUS LAB TEST RESULT: NORMAL

## 2020-06-16 ENCOUNTER — TELEPHONE (OUTPATIENT)
Dept: FAMILY MEDICINE CLINIC | Facility: CLINIC | Age: 50
End: 2020-06-16

## 2020-08-04 ENCOUNTER — ANNUAL EXAM (OUTPATIENT)
Dept: OBGYN CLINIC | Facility: CLINIC | Age: 50
End: 2020-08-04

## 2020-08-04 VITALS
WEIGHT: 130 LBS | TEMPERATURE: 98.2 F | BODY MASS INDEX: 24.56 KG/M2 | HEART RATE: 71 BPM | SYSTOLIC BLOOD PRESSURE: 132 MMHG | DIASTOLIC BLOOD PRESSURE: 81 MMHG

## 2020-08-04 DIAGNOSIS — Z01.419 ENCOUNTER FOR GYNECOLOGICAL EXAMINATION (GENERAL) (ROUTINE) WITHOUT ABNORMAL FINDINGS: Primary | ICD-10-CM

## 2020-08-04 DIAGNOSIS — R35.0 URINARY FREQUENCY: ICD-10-CM

## 2020-08-04 DIAGNOSIS — Z12.39 BREAST CANCER SCREENING: ICD-10-CM

## 2020-08-04 DIAGNOSIS — Z20.2 POSSIBLE EXPOSURE TO STD: ICD-10-CM

## 2020-08-04 DIAGNOSIS — N92.3 INTERMENSTRUAL BLEEDING: ICD-10-CM

## 2020-08-04 LAB
SL AMB  POCT GLUCOSE, UA: NORMAL
SL AMB LEUKOCYTE ESTERASE,UA: NORMAL
SL AMB POCT BILIRUBIN,UA: NORMAL
SL AMB POCT BLOOD,UA: NORMAL
SL AMB POCT CLARITY,UA: CLEAR
SL AMB POCT COLOR,UA: YELLOW
SL AMB POCT KETONES,UA: NORMAL
SL AMB POCT NITRITE,UA: NORMAL
SL AMB POCT PH,UA: NORMAL
SL AMB POCT SPECIFIC GRAVITY,UA: NORMAL
SL AMB POCT URINE PROTEIN: NORMAL
SL AMB POCT UROBILINOGEN: NORMAL

## 2020-08-04 PROCEDURE — 3725F SCREEN DEPRESSION PERFORMED: CPT | Performed by: OBSTETRICS & GYNECOLOGY

## 2020-08-04 PROCEDURE — 81002 URINALYSIS NONAUTO W/O SCOPE: CPT | Performed by: OBSTETRICS & GYNECOLOGY

## 2020-08-04 PROCEDURE — 3079F DIAST BP 80-89 MM HG: CPT | Performed by: OBSTETRICS & GYNECOLOGY

## 2020-08-04 PROCEDURE — 87491 CHLMYD TRACH DNA AMP PROBE: CPT | Performed by: OBSTETRICS & GYNECOLOGY

## 2020-08-04 PROCEDURE — 3075F SYST BP GE 130 - 139MM HG: CPT | Performed by: OBSTETRICS & GYNECOLOGY

## 2020-08-04 PROCEDURE — 1036F TOBACCO NON-USER: CPT | Performed by: OBSTETRICS & GYNECOLOGY

## 2020-08-04 PROCEDURE — 87591 N.GONORRHOEAE DNA AMP PROB: CPT | Performed by: OBSTETRICS & GYNECOLOGY

## 2020-08-04 PROCEDURE — 99396 PREV VISIT EST AGE 40-64: CPT | Performed by: OBSTETRICS & GYNECOLOGY

## 2020-08-04 RX ORDER — MEDROXYPROGESTERONE ACETATE 10 MG/1
10 TABLET ORAL DAILY
Qty: 30 TABLET | Refills: 11 | Status: SHIPPED | OUTPATIENT
Start: 2020-08-04 | End: 2022-03-08

## 2020-08-04 NOTE — PROGRESS NOTES
Assessment/Plan:     No problem-specific Assessment & Plan notes found for this encounter  Diagnoses and all orders for this visit:    Encounter for gynecological examination (general) (routine) without abnormal findings    Breast cancer screening  -     Mammo screening bilateral w 3d & cad; Future    Possible exposure to STD  -     Chlamydia/GC amplified DNA by PCR  -     RPR; Future  -     Hepatitis C antibody; Future    Intermenstrual bleeding  -     medroxyPROGESTERone (PROVERA) 10 mg tablet; Take 1 tablet (10 mg total) by mouth daily       Depression Screening Follow-up Plan: Patient's depression screening was positive with a PHQ-2 score of 0  Their PHQ-9 score was 4  Clinically patient does not have depression  No treatment is required  RTO in 3 months for recheck  Subjective:      Patient ID: Anay Wang is a 52 y o  female presents for annual exam   Her last pap was 07-01-19 and was ASCUS with negative HPV  She is due for retest in 2 years  Her last mammogram was 07-06-19 and was birads 1  She recently had a breast lift, so mammogram can be deferred until healing is complete  She reports intermenstrual spotting  She is bothered by this  It also happened with intercourse  She would like to try a trial of provera  She is interested in STD testing  HPI    The following portions of the patient's history were reviewed and updated as appropriate: allergies, current medications, past family history, past medical history, past social history, past surgical history and problem list     Review of Systems      Objective:      /81   Pulse 71   Temp 98 2 °F (36 8 °C)   Wt 59 kg (130 lb)   LMP 08/03/2020 (Approximate) Comment: Mirena   BMI 24 56 kg/m²          Physical Exam   Constitutional: She is oriented to person, place, and time  She appears well-developed  No distress  HENT:   Head: Normocephalic  Neck: No thyromegaly present     Cardiovascular: Normal rate, regular rhythm and normal heart sounds  No murmur heard  Pulmonary/Chest: Effort normal and breath sounds normal  No respiratory distress  She has no wheezes  She has no rales  She exhibits no tenderness  Abdominal: Soft  Bowel sounds are normal  She exhibits no distension and no mass  There is no abdominal tenderness  There is no rebound and no guarding  Genitourinary: Rectum:      No external hemorrhoid  There is no rash, tenderness, lesion or injury on the right labia  There is no rash, tenderness, lesion or injury on the left labia  Cervix exhibits no motion tenderness, no discharge and no friability  Right adnexum displays no mass, no tenderness and no fullness  Left adnexum displays no mass, no tenderness and no fullness  Neurological: She is alert and oriented to person, place, and time  Skin: Skin is warm and dry  Psychiatric: Her behavior is normal  Judgment and thought content normal    Nursing note and vitals reviewed

## 2020-08-06 ENCOUNTER — HOSPITAL ENCOUNTER (OUTPATIENT)
Dept: NON INVASIVE DIAGNOSTICS | Facility: HOSPITAL | Age: 50
Discharge: HOME | End: 2020-08-06
Attending: FAMILY MEDICINE
Payer: COMMERCIAL

## 2020-08-06 DIAGNOSIS — R94.31 ABNORMAL EKG: ICD-10-CM

## 2020-08-06 LAB
C TRACH DNA SPEC QL NAA+PROBE: NEGATIVE
N GONORRHOEA DNA SPEC QL NAA+PROBE: NEGATIVE

## 2020-08-06 PROCEDURE — 93306 TTE W/DOPPLER COMPLETE: CPT

## 2020-08-07 ENCOUNTER — TRANSCRIBE ORDERS (OUTPATIENT)
Dept: LAB | Facility: HOSPITAL | Age: 50
End: 2020-08-07

## 2020-08-07 ENCOUNTER — APPOINTMENT (OUTPATIENT)
Dept: LAB | Facility: HOSPITAL | Age: 50
End: 2020-08-07
Attending: INTERNAL MEDICINE
Payer: COMMERCIAL

## 2020-08-07 DIAGNOSIS — Z20.2 POSSIBLE EXPOSURE TO STD: ICD-10-CM

## 2020-08-07 DIAGNOSIS — E78.00 PURE HYPERCHOLESTEROLEMIA: ICD-10-CM

## 2020-08-07 DIAGNOSIS — D47.2 MONOCLONAL GAMMOPATHY: ICD-10-CM

## 2020-08-07 DIAGNOSIS — Z12.39 OTHER SCREENING BREAST EXAMINATION: ICD-10-CM

## 2020-08-07 DIAGNOSIS — Z11.4 ENCOUNTER FOR SCREENING FOR HIV: ICD-10-CM

## 2020-08-07 DIAGNOSIS — D50.0 IRON DEFICIENCY ANEMIA DUE TO CHRONIC BLOOD LOSS: ICD-10-CM

## 2020-08-07 DIAGNOSIS — Z12.39 OTHER SCREENING BREAST EXAMINATION: Primary | ICD-10-CM

## 2020-08-07 DIAGNOSIS — K52.9 CHRONIC DIARRHEA: ICD-10-CM

## 2020-08-07 LAB
ALBUMIN SERPL BCP-MCNC: 4.3 G/DL (ref 3–5.2)
ALP SERPL-CCNC: 59 U/L (ref 43–122)
ALT SERPL W P-5'-P-CCNC: 35 U/L (ref 9–52)
ANION GAP SERPL CALCULATED.3IONS-SCNC: 4 MMOL/L (ref 5–14)
AST SERPL W P-5'-P-CCNC: 53 U/L (ref 14–36)
BASOPHILS # BLD AUTO: 0 THOUSANDS/ΜL (ref 0–0.1)
BASOPHILS NFR BLD AUTO: 1 % (ref 0–1)
BILIRUB SERPL-MCNC: 0.4 MG/DL
BUN SERPL-MCNC: 15 MG/DL (ref 5–25)
CALCIUM SERPL-MCNC: 9.1 MG/DL (ref 8.4–10.2)
CHLORIDE SERPL-SCNC: 104 MMOL/L (ref 97–108)
CHOLEST SERPL-MCNC: 204 MG/DL
CO2 SERPL-SCNC: 28 MMOL/L (ref 22–30)
CREAT SERPL-MCNC: 0.7 MG/DL (ref 0.6–1.2)
EOSINOPHIL # BLD AUTO: 0 THOUSAND/ΜL (ref 0–0.4)
EOSINOPHIL NFR BLD AUTO: 1 % (ref 0–6)
ERYTHROCYTE [DISTWIDTH] IN BLOOD BY AUTOMATED COUNT: 13.4 %
FERRITIN SERPL-MCNC: 27 NG/ML (ref 8–388)
GFR SERPL CREATININE-BSD FRML MDRD: 118 ML/MIN/1.73SQ M
GLUCOSE P FAST SERPL-MCNC: 92 MG/DL (ref 70–99)
HCT VFR BLD AUTO: 38.8 % (ref 36–46)
HDLC SERPL-MCNC: 62 MG/DL
HGB BLD-MCNC: 13.3 G/DL (ref 12–16)
IGA SERPL-MCNC: 216 MG/DL (ref 70–400)
IGG SERPL-MCNC: 1330 MG/DL (ref 700–1600)
IGM SERPL-MCNC: 133 MG/DL (ref 40–230)
IRON SATN MFR SERPL: 27 %
IRON SERPL-MCNC: 87 UG/DL (ref 50–170)
LDLC SERPL CALC-MCNC: 127 MG/DL
LYMPHOCYTES # BLD AUTO: 1.2 THOUSANDS/ΜL (ref 0.5–4)
LYMPHOCYTES NFR BLD AUTO: 31 % (ref 25–45)
MCH RBC QN AUTO: 32.6 PG (ref 26–34)
MCHC RBC AUTO-ENTMCNC: 34.3 G/DL (ref 31–36)
MCV RBC AUTO: 95 FL (ref 80–100)
MONOCYTES # BLD AUTO: 0.4 THOUSAND/ΜL (ref 0.2–0.9)
MONOCYTES NFR BLD AUTO: 11 % (ref 1–10)
NEUTROPHILS # BLD AUTO: 2.1 THOUSANDS/ΜL (ref 1.8–7.8)
NEUTS SEG NFR BLD AUTO: 56 % (ref 45–65)
PLATELET # BLD AUTO: 195 THOUSANDS/UL (ref 150–450)
PMV BLD AUTO: 9.3 FL (ref 8.9–12.7)
POTASSIUM SERPL-SCNC: 4.2 MMOL/L (ref 3.6–5)
PROT SERPL-MCNC: 7.6 G/DL (ref 5.9–8.4)
RBC # BLD AUTO: 4.1 MILLION/UL (ref 4–5.2)
SODIUM SERPL-SCNC: 136 MMOL/L (ref 137–147)
TIBC SERPL-MCNC: 328 UG/DL (ref 250–450)
TRIGL SERPL-MCNC: 74 MG/DL
VIT B12 SERPL-MCNC: 606 PG/ML (ref 100–900)
WBC # BLD AUTO: 3.7 THOUSAND/UL (ref 4.5–11)

## 2020-08-07 PROCEDURE — 80053 COMPREHEN METABOLIC PANEL: CPT

## 2020-08-07 PROCEDURE — 82784 ASSAY IGA/IGD/IGG/IGM EACH: CPT

## 2020-08-07 PROCEDURE — 84165 PROTEIN E-PHORESIS SERUM: CPT

## 2020-08-07 PROCEDURE — 85025 COMPLETE CBC W/AUTO DIFF WBC: CPT

## 2020-08-07 PROCEDURE — 84166 PROTEIN E-PHORESIS/URINE/CSF: CPT

## 2020-08-07 PROCEDURE — 36415 COLL VENOUS BLD VENIPUNCTURE: CPT

## 2020-08-07 PROCEDURE — 84166 PROTEIN E-PHORESIS/URINE/CSF: CPT | Performed by: PATHOLOGY

## 2020-08-07 PROCEDURE — 83540 ASSAY OF IRON: CPT

## 2020-08-07 PROCEDURE — 83883 ASSAY NEPHELOMETRY NOT SPEC: CPT

## 2020-08-07 PROCEDURE — 80061 LIPID PANEL: CPT

## 2020-08-07 PROCEDURE — 87389 HIV-1 AG W/HIV-1&-2 AB AG IA: CPT

## 2020-08-07 PROCEDURE — 84165 PROTEIN E-PHORESIS SERUM: CPT | Performed by: PATHOLOGY

## 2020-08-07 PROCEDURE — 82607 VITAMIN B-12: CPT

## 2020-08-07 PROCEDURE — 83550 IRON BINDING TEST: CPT

## 2020-08-07 PROCEDURE — 82232 ASSAY OF BETA-2 PROTEIN: CPT

## 2020-08-07 PROCEDURE — 86592 SYPHILIS TEST NON-TREP QUAL: CPT

## 2020-08-07 PROCEDURE — 82728 ASSAY OF FERRITIN: CPT

## 2020-08-07 PROCEDURE — 86803 HEPATITIS C AB TEST: CPT

## 2020-08-08 LAB
HCV AB SER QL: NORMAL
HIV 1+2 AB+HIV1 P24 AG SERPL QL IA: NORMAL
KAPPA LC FREE SER-MCNC: 17.2 MG/L (ref 3.3–19.4)
KAPPA LC FREE/LAMBDA FREE SER: 1.48 {RATIO} (ref 0.26–1.65)
LAMBDA LC FREE SERPL-MCNC: 11.6 MG/L (ref 5.7–26.3)

## 2020-08-10 LAB
ALBUMIN SERPL ELPH-MCNC: 4.13 G/DL (ref 3.5–5)
ALBUMIN SERPL ELPH-MCNC: 56.6 % (ref 52–65)
ALBUMIN UR ELPH-MCNC: 100 %
ALPHA1 GLOB MFR UR ELPH: 0 %
ALPHA1 GLOB SERPL ELPH-MCNC: 0.31 G/DL (ref 0.1–0.4)
ALPHA1 GLOB SERPL ELPH-MCNC: 4.2 % (ref 2.5–5)
ALPHA2 GLOB MFR UR ELPH: 0 %
ALPHA2 GLOB SERPL ELPH-MCNC: 0.74 G/DL (ref 0.4–1.2)
ALPHA2 GLOB SERPL ELPH-MCNC: 10.2 % (ref 7–13)
B-GLOBULIN MFR UR ELPH: 0 %
B2 MICROGLOB SERPL-MCNC: 0.9 MG/L (ref 0.6–2.4)
BETA GLOB ABNORMAL SERPL ELPH-MCNC: 0.45 G/DL (ref 0.4–0.8)
BETA1 GLOB SERPL ELPH-MCNC: 6.1 % (ref 5–13)
BETA2 GLOB SERPL ELPH-MCNC: 4.8 % (ref 2–8)
BETA2+GAMMA GLOB SERPL ELPH-MCNC: 0.35 G/DL (ref 0.2–0.5)
GAMMA GLOB ABNORMAL SERPL ELPH-MCNC: 1.32 G/DL (ref 0.5–1.6)
GAMMA GLOB MFR UR ELPH: 0 %
GAMMA GLOB SERPL ELPH-MCNC: 18.1 % (ref 12–22)
IGG/ALB SER: 1.3 {RATIO} (ref 1.1–1.8)
M PROTEIN 1 MFR SERPL ELPH: 6 %
M PROTEIN 1 SERPL ELPH-MCNC: 0.44 G/DL
PROT PATTERN SERPL ELPH-IMP: NORMAL
PROT PATTERN UR ELPH-IMP: NORMAL
PROT SERPL-MCNC: 7.3 G/DL (ref 6.4–8.2)
PROT UR-MCNC: 10 MG/DL
RPR SER QL: NORMAL

## 2020-08-11 LAB
KAPPA LC UR-MCNC: 21.5 MG/L (ref 0.63–113.79)
KAPPA LC/LAMBDA UR: 26.88 {RATIO} (ref 1.03–31.76)
LAMBDA LC UR-MCNC: 0.8 MG/L (ref 0.47–11.77)

## 2020-08-12 ENCOUNTER — OFFICE VISIT (OUTPATIENT)
Dept: FAMILY MEDICINE CLINIC | Facility: CLINIC | Age: 50
End: 2020-08-12
Payer: COMMERCIAL

## 2020-08-12 VITALS
SYSTOLIC BLOOD PRESSURE: 131 MMHG | BODY MASS INDEX: 24.51 KG/M2 | HEIGHT: 61 IN | OXYGEN SATURATION: 99 % | WEIGHT: 129.8 LBS | HEART RATE: 62 BPM | TEMPERATURE: 97.6 F | DIASTOLIC BLOOD PRESSURE: 79 MMHG

## 2020-08-12 DIAGNOSIS — Z00.00 WELL ADULT EXAM: Primary | ICD-10-CM

## 2020-08-12 DIAGNOSIS — R76.8 ANA POSITIVE: ICD-10-CM

## 2020-08-12 DIAGNOSIS — D47.2 MONOCLONAL GAMMOPATHY: ICD-10-CM

## 2020-08-12 DIAGNOSIS — K52.9 CHRONIC DIARRHEA: ICD-10-CM

## 2020-08-12 DIAGNOSIS — I10 ESSENTIAL HYPERTENSION: ICD-10-CM

## 2020-08-12 DIAGNOSIS — E78.00 PURE HYPERCHOLESTEROLEMIA: ICD-10-CM

## 2020-08-12 DIAGNOSIS — R76.8 POSITIVE ANA (ANTINUCLEAR ANTIBODY): ICD-10-CM

## 2020-08-12 DIAGNOSIS — D72.819 LEUKOPENIA, UNSPECIFIED TYPE: ICD-10-CM

## 2020-08-12 DIAGNOSIS — I35.1 NONRHEUMATIC AORTIC VALVE INSUFFICIENCY: ICD-10-CM

## 2020-08-12 DIAGNOSIS — I36.1 NONRHEUMATIC TRICUSPID VALVE REGURGITATION: ICD-10-CM

## 2020-08-12 DIAGNOSIS — I37.1 NONRHEUMATIC PULMONARY VALVE INSUFFICIENCY: ICD-10-CM

## 2020-08-12 DIAGNOSIS — R79.89 ABNORMAL LIVER FUNCTION TEST: ICD-10-CM

## 2020-08-12 DIAGNOSIS — Z28.20 IMMUNIZATION NOT CARRIED OUT BECAUSE OF PATIENT DECISION: ICD-10-CM

## 2020-08-12 DIAGNOSIS — I31.9 DISEASE OF PERICARDIUM, UNSPECIFIED: ICD-10-CM

## 2020-08-12 PROCEDURE — 1036F TOBACCO NON-USER: CPT | Performed by: FAMILY MEDICINE

## 2020-08-12 PROCEDURE — 3008F BODY MASS INDEX DOCD: CPT | Performed by: OBSTETRICS & GYNECOLOGY

## 2020-08-12 PROCEDURE — 99396 PREV VISIT EST AGE 40-64: CPT | Performed by: FAMILY MEDICINE

## 2020-08-12 PROCEDURE — 3008F BODY MASS INDEX DOCD: CPT | Performed by: FAMILY MEDICINE

## 2020-08-12 PROCEDURE — 3075F SYST BP GE 130 - 139MM HG: CPT | Performed by: FAMILY MEDICINE

## 2020-08-12 PROCEDURE — 3078F DIAST BP <80 MM HG: CPT | Performed by: FAMILY MEDICINE

## 2020-08-12 PROCEDURE — 99214 OFFICE O/P EST MOD 30 MIN: CPT | Performed by: FAMILY MEDICINE
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
## 2020-08-12 NOTE — PROGRESS NOTES
Assessment/Plan:       No problem-specific Assessment & Plan notes found for this encounter  Diagnoses and all orders for this visit:    Well adult exam    Abnormal liver function test  Comments:  Not to exceed 2 drinking /1 day  Orders:  -     Hepatic function panel; Future    Pure hypercholesterolemia  Comments:  improving  Discussed start statin  Await repeat liver function test    GONZALO positive  Comments:  Negative GONZALO profile, pt ia asymtomatic    Monoclonal gammopathy  Comments: To follow up with Dr Radha Flower of pericardium, unspecified  Comments:  thickening  Orders:  -     Ambulatory referral to Cardiology; Future    Chronic diarrhea  Comments:  Patient was referred to GI advised to follow-up with  Orders:  -     Ambulatory referral to Gastroenterology; Future    Positive GONZALO (antinuclear antibody)  Comments:  Patient is asymptomatic  Negative GONZALO profile    Leukopenia, unspecified type  Comments: To follow with Dr Kaci Webster aortic valve insufficiency  Comments: Follow up with an echocardiogram in 1-2 years    Nonrheumatic tricuspid valve regurgitation  Comments:  Recheck echocardiogram in 1-2 years    Nonrheumatic pulmonary valve insufficiency  Comments: Follow up with an echocardiogram in 1-2 years    Essential hypertension  Comments:  Controlled  To follow with the dash diet    Immunization not carried out because of patient decision  Comments:  Patient declined to take Tdap and Prevnar        Patient Instructions   To follow up with test results      Orders Placed This Encounter   Procedures    Hepatic function panel     This is a patient instruction: This test is non-fasting  Please drink two glasses of water morning of bloodwork          Standing Status:   Future     Standing Expiration Date:   8/12/2021    Ambulatory referral to Gastroenterology     Standing Status:   Future     Standing Expiration Date:   8/12/2021     Referral Priority:   Routine     Referral Type: Consult - AMB     Referral Reason:   Specialty Services Required     Referred to Provider:   Laura Tanner MD     Requested Specialty:   Gastroenterology     Number of Visits Requested:   1     Expiration Date:   8/12/2021   Ardeth Needs Ambulatory referral to Cardiology     Standing Status:   Future     Standing Expiration Date:   8/12/2021     Referral Priority:   Routine     Referral Type:   Consult - AMB     Referral Reason:   Specialty Services Required     Referred to Provider:   Miri Lin DO     Requested Specialty:   Cardiology     Number of Visits Requested:   1     Expiration Date:   8/12/2021         Subjective:     Patient ID: Ramiro Lyle is a 52 y o  female      HPI  Hyperlipidemia  Admit to low-fat diet  Patient exercised vigorously  Denied chest pain  Diarrhea  patient to continue to have wanted 3 soft bowel movement a day, started in the last few years  Monoclonal gammopathy  Denied weight loss, bone pain or hematuria  Following with Hematology  + GONZALO  Patient denied arthralgia or myalgia      Test results  Lab done on July 1st stool cultures  Lab done on August 4th and 07/2020  Echo Doppler  Review of Systems   Constitutional: Negative for chills, diaphoresis and fatigue  HENT: Negative for ear pain, sore throat, trouble swallowing and voice change  Eyes: Negative for visual disturbance  Respiratory: Negative for cough, chest tightness and shortness of breath  Cardiovascular: Negative for chest pain, palpitations and leg swelling  Gastrointestinal: Negative for abdominal distention, abdominal pain, blood in stool, constipation, diarrhea and nausea  Endocrine: Negative for polydipsia and polyuria  Genitourinary: Negative for dysuria, flank pain, frequency, hematuria, pelvic pain, urgency, vaginal bleeding and vaginal discharge  Musculoskeletal: Negative for arthralgias, back pain, gait problem, myalgias and neck pain  Skin: Negative for rash     Allergic/Immunologic: Negative for food allergies  Neurological: Negative for dizziness, tremors, seizures, weakness, light-headedness, numbness and headaches  Hematological: Negative for adenopathy  Does not bruise/bleed easily  Psychiatric/Behavioral: Negative for confusion and dysphoric mood  The patient is not nervous/anxious  Objective:     Physical Exam  Nursing note reviewed  Constitutional:       General: She is not in acute distress  Appearance: Normal appearance  She is well-developed  HENT:      Head: Normocephalic  Eyes:      General: No scleral icterus  Extraocular Movements: Extraocular movements intact  Conjunctiva/sclera: Conjunctivae normal    Neck:      Musculoskeletal: Neck supple  Thyroid: No thyromegaly  Vascular: No carotid bruit  Cardiovascular:      Rate and Rhythm: Normal rate and regular rhythm  Heart sounds: Normal heart sounds  Pulmonary:      Effort: Pulmonary effort is normal       Breath sounds: Normal breath sounds  Abdominal:      General: Bowel sounds are normal  There is no distension  Palpations: Abdomen is soft  There is no mass  Tenderness: There is no abdominal tenderness  There is no guarding or rebound  Hernia: No hernia is present  Musculoskeletal:         General: No swelling  Right lower leg: No edema  Left lower leg: No edema  Lymphadenopathy:      Cervical: No cervical adenopathy  Skin:     Findings: No rash  Neurological:      Mental Status: She is alert and oriented to person, place, and time  Cranial Nerves: No cranial nerve deficit  Motor: No abnormal muscle tone  Coordination: Coordination normal       Gait: Gait normal    Psychiatric:         Mood and Affect: Mood normal          Behavior: Behavior normal          Thought Content:  Thought content normal

## 2020-08-17 ENCOUNTER — TELEPHONE (OUTPATIENT)
Dept: OBGYN CLINIC | Facility: CLINIC | Age: 50
End: 2020-08-17

## 2020-09-18 ENCOUNTER — APPOINTMENT (OUTPATIENT)
Dept: LAB | Facility: HOSPITAL | Age: 50
End: 2020-09-18
Attending: FAMILY MEDICINE
Payer: COMMERCIAL

## 2020-09-18 DIAGNOSIS — R79.89 ABNORMAL LIVER FUNCTION TEST: ICD-10-CM

## 2020-09-18 LAB
ALBUMIN SERPL BCP-MCNC: 4.3 G/DL (ref 3–5.2)
ALP SERPL-CCNC: 60 U/L (ref 43–122)
ALT SERPL W P-5'-P-CCNC: 44 U/L (ref 9–52)
AST SERPL W P-5'-P-CCNC: 36 U/L (ref 14–36)
BILIRUB DIRECT SERPL-MCNC: 0 MG/DL
BILIRUB SERPL-MCNC: 0.4 MG/DL
PROT SERPL-MCNC: 7.6 G/DL (ref 5.9–8.4)

## 2020-09-18 PROCEDURE — 80076 HEPATIC FUNCTION PANEL: CPT

## 2020-09-18 PROCEDURE — 36415 COLL VENOUS BLD VENIPUNCTURE: CPT

## 2020-09-24 ENCOUNTER — TELEPHONE (OUTPATIENT)
Dept: FAMILY MEDICINE CLINIC | Facility: CLINIC | Age: 50
End: 2020-09-24

## 2020-09-24 NOTE — TELEPHONE ENCOUNTER
I called and spoke with the patient  She would like to start a cholesterol medication  She didn't wan to wait 2 months  She said that you were waiting for the liver function test to come back before starting them?

## 2020-09-24 NOTE — TELEPHONE ENCOUNTER
----- Message from Ray Borrero MD sent at 9/19/2020  9:01 PM EDT -----  Liver function test is normal   Schedule patient for follow-up office visit in 2 months please

## 2020-09-28 NOTE — TELEPHONE ENCOUNTER
Recommend to start crestor 5 mg one tab a day , check lipid and LFT in 6 moths , ask pt when she had her last menstrual period please

## 2020-09-30 DIAGNOSIS — E78.00 PURE HYPERCHOLESTEROLEMIA: Primary | ICD-10-CM

## 2020-09-30 RX ORDER — ROSUVASTATIN CALCIUM 5 MG/1
5 TABLET, COATED ORAL DAILY
Qty: 30 TABLET | Refills: 1 | Status: SHIPPED | OUTPATIENT
Start: 2020-09-30 | End: 2020-11-04

## 2020-09-30 NOTE — TELEPHONE ENCOUNTER
spotting everything on her menstrual I put in the las for the patient and also the medication to be sent to the pharmacy

## 2020-11-03 ENCOUNTER — TELEPHONE (OUTPATIENT)
Dept: OBGYN CLINIC | Facility: CLINIC | Age: 50
End: 2020-11-03

## 2020-11-03 ENCOUNTER — CONSULT (OUTPATIENT)
Dept: CARDIOLOGY CLINIC | Facility: CLINIC | Age: 50
End: 2020-11-03
Payer: COMMERCIAL

## 2020-11-03 VITALS
HEIGHT: 61 IN | BODY MASS INDEX: 23.79 KG/M2 | TEMPERATURE: 97.3 F | SYSTOLIC BLOOD PRESSURE: 158 MMHG | DIASTOLIC BLOOD PRESSURE: 96 MMHG | HEART RATE: 77 BPM | WEIGHT: 126 LBS

## 2020-11-03 DIAGNOSIS — I31.9 DISEASE OF PERICARDIUM, UNSPECIFIED: ICD-10-CM

## 2020-11-03 PROCEDURE — 99243 OFF/OP CNSLTJ NEW/EST LOW 30: CPT | Performed by: INTERNAL MEDICINE

## 2020-11-04 ENCOUNTER — TELEPHONE (OUTPATIENT)
Dept: FAMILY MEDICINE CLINIC | Facility: CLINIC | Age: 50
End: 2020-11-04

## 2020-11-04 ENCOUNTER — OFFICE VISIT (OUTPATIENT)
Dept: OBGYN CLINIC | Facility: CLINIC | Age: 50
End: 2020-11-04

## 2020-11-04 VITALS
TEMPERATURE: 98 F | BODY MASS INDEX: 23.68 KG/M2 | DIASTOLIC BLOOD PRESSURE: 77 MMHG | SYSTOLIC BLOOD PRESSURE: 128 MMHG | HEART RATE: 60 BPM | WEIGHT: 125.4 LBS | HEIGHT: 61 IN

## 2020-11-04 DIAGNOSIS — Z87.42: Primary | ICD-10-CM

## 2020-11-04 PROCEDURE — 1036F TOBACCO NON-USER: CPT | Performed by: OBSTETRICS & GYNECOLOGY

## 2020-11-04 PROCEDURE — 99213 OFFICE O/P EST LOW 20 MIN: CPT | Performed by: OBSTETRICS & GYNECOLOGY

## 2020-11-06 ENCOUNTER — OFFICE VISIT (OUTPATIENT)
Dept: GASTROENTEROLOGY | Facility: CLINIC | Age: 50
End: 2020-11-06
Payer: COMMERCIAL

## 2020-11-06 VITALS
DIASTOLIC BLOOD PRESSURE: 80 MMHG | SYSTOLIC BLOOD PRESSURE: 138 MMHG | TEMPERATURE: 98.4 F | BODY MASS INDEX: 23.98 KG/M2 | HEART RATE: 49 BPM | HEIGHT: 61 IN | WEIGHT: 127 LBS

## 2020-11-06 DIAGNOSIS — K52.9 CHRONIC DIARRHEA: Primary | ICD-10-CM

## 2020-11-06 DIAGNOSIS — Z12.11 SCREENING FOR COLON CANCER: ICD-10-CM

## 2020-11-06 PROCEDURE — 99203 OFFICE O/P NEW LOW 30 MIN: CPT | Performed by: INTERNAL MEDICINE

## 2020-11-06 PROCEDURE — 1036F TOBACCO NON-USER: CPT | Performed by: INTERNAL MEDICINE

## 2020-11-06 RX ORDER — POLYETHYLENE GLYCOL 3350, SODIUM SULFATE ANHYDROUS, SODIUM BICARBONATE, SODIUM CHLORIDE, POTASSIUM CHLORIDE 236; 22.74; 6.74; 5.86; 2.97 G/4L; G/4L; G/4L; G/4L; G/4L
4 POWDER, FOR SOLUTION ORAL ONCE
Qty: 4000 ML | Refills: 0 | Status: SHIPPED | OUTPATIENT
Start: 2020-11-06 | End: 2022-07-12 | Stop reason: ALTCHOICE

## 2020-11-09 ENCOUNTER — TELEPHONE (OUTPATIENT)
Dept: OBGYN CLINIC | Facility: CLINIC | Age: 50
End: 2020-11-09

## 2020-11-11 ENCOUNTER — OFFICE VISIT (OUTPATIENT)
Dept: FAMILY MEDICINE CLINIC | Facility: CLINIC | Age: 50
End: 2020-11-11
Payer: COMMERCIAL

## 2020-11-11 VITALS
WEIGHT: 127.2 LBS | OXYGEN SATURATION: 98 % | TEMPERATURE: 97.1 F | DIASTOLIC BLOOD PRESSURE: 70 MMHG | HEART RATE: 59 BPM | RESPIRATION RATE: 16 BRPM | HEIGHT: 61 IN | SYSTOLIC BLOOD PRESSURE: 102 MMHG | BODY MASS INDEX: 24.02 KG/M2

## 2020-11-11 DIAGNOSIS — E78.00 PURE HYPERCHOLESTEROLEMIA: ICD-10-CM

## 2020-11-11 DIAGNOSIS — K52.9 CHRONIC DIARRHEA: ICD-10-CM

## 2020-11-11 DIAGNOSIS — R79.89 ABNORMAL LIVER FUNCTION TEST: ICD-10-CM

## 2020-11-11 DIAGNOSIS — I31.9 DISEASE OF PERICARDIUM, UNSPECIFIED: ICD-10-CM

## 2020-11-11 DIAGNOSIS — G47.00 INSOMNIA, UNSPECIFIED TYPE: Primary | ICD-10-CM

## 2020-11-11 DIAGNOSIS — Z28.20 IMMUNIZATION NOT CARRIED OUT BECAUSE OF PATIENT DECISION: ICD-10-CM

## 2020-11-11 PROCEDURE — 99214 OFFICE O/P EST MOD 30 MIN: CPT | Performed by: FAMILY MEDICINE

## 2020-11-11 PROCEDURE — 3078F DIAST BP <80 MM HG: CPT | Performed by: FAMILY MEDICINE

## 2020-11-11 PROCEDURE — 3008F BODY MASS INDEX DOCD: CPT | Performed by: OBSTETRICS & GYNECOLOGY

## 2020-11-11 PROCEDURE — 3008F BODY MASS INDEX DOCD: CPT | Performed by: FAMILY MEDICINE

## 2020-11-11 PROCEDURE — 3074F SYST BP LT 130 MM HG: CPT | Performed by: FAMILY MEDICINE

## 2020-11-11 PROCEDURE — 1036F TOBACCO NON-USER: CPT | Performed by: FAMILY MEDICINE

## 2020-11-11 RX ORDER — CALCIUM CARBONATE 300MG(750)
TABLET,CHEWABLE ORAL
COMMUNITY
End: 2022-03-08

## 2020-11-11 RX ORDER — ATORVASTATIN CALCIUM 10 MG/1
10 TABLET, FILM COATED ORAL DAILY
Qty: 30 TABLET | Refills: 2 | Status: SHIPPED | OUTPATIENT
Start: 2020-11-11 | End: 2021-08-12

## 2020-12-10 ENCOUNTER — OFFICE VISIT (OUTPATIENT)
Dept: OBGYN CLINIC | Facility: CLINIC | Age: 50
End: 2020-12-10

## 2020-12-10 VITALS
HEIGHT: 61 IN | SYSTOLIC BLOOD PRESSURE: 135 MMHG | BODY MASS INDEX: 24.09 KG/M2 | DIASTOLIC BLOOD PRESSURE: 83 MMHG | WEIGHT: 127.6 LBS | HEART RATE: 71 BPM

## 2020-12-10 DIAGNOSIS — R35.0 URINARY FREQUENCY: Primary | ICD-10-CM

## 2020-12-10 DIAGNOSIS — K52.9 CHRONIC DIARRHEA: ICD-10-CM

## 2020-12-10 DIAGNOSIS — R39.15 URINARY URGENCY: ICD-10-CM

## 2020-12-10 PROCEDURE — 3008F BODY MASS INDEX DOCD: CPT | Performed by: OBSTETRICS & GYNECOLOGY

## 2020-12-10 PROCEDURE — 3008F BODY MASS INDEX DOCD: CPT | Performed by: FAMILY MEDICINE

## 2020-12-10 PROCEDURE — 99396 PREV VISIT EST AGE 40-64: CPT | Performed by: STUDENT IN AN ORGANIZED HEALTH CARE EDUCATION/TRAINING PROGRAM

## 2020-12-10 RX ORDER — MIRABEGRON 50 MG/1
1 TABLET, FILM COATED, EXTENDED RELEASE ORAL DAILY
Qty: 30 TABLET | Refills: 6 | Status: SHIPPED | OUTPATIENT
Start: 2020-12-10 | End: 2022-03-08

## 2020-12-28 ENCOUNTER — OFFICE VISIT (OUTPATIENT)
Dept: SLEEP CENTER | Facility: CLINIC | Age: 50
End: 2020-12-28
Payer: COMMERCIAL

## 2020-12-28 VITALS
WEIGHT: 129 LBS | BODY MASS INDEX: 24.35 KG/M2 | HEIGHT: 61 IN | HEART RATE: 49 BPM | SYSTOLIC BLOOD PRESSURE: 114 MMHG | DIASTOLIC BLOOD PRESSURE: 74 MMHG

## 2020-12-28 DIAGNOSIS — G25.81 RESTLESS LEG SYNDROME: ICD-10-CM

## 2020-12-28 DIAGNOSIS — G47.00 INSOMNIA, UNSPECIFIED TYPE: Primary | ICD-10-CM

## 2020-12-28 DIAGNOSIS — G47.33 OBSTRUCTIVE SLEEP APNEA: ICD-10-CM

## 2020-12-28 PROCEDURE — 3008F BODY MASS INDEX DOCD: CPT | Performed by: FAMILY MEDICINE

## 2020-12-28 PROCEDURE — 3008F BODY MASS INDEX DOCD: CPT | Performed by: OBSTETRICS & GYNECOLOGY

## 2020-12-28 PROCEDURE — 99244 OFF/OP CNSLTJ NEW/EST MOD 40: CPT | Performed by: NURSE PRACTITIONER

## 2020-12-28 RX ORDER — ZOLPIDEM TARTRATE 5 MG/1
TABLET ORAL
Qty: 1 TABLET | Refills: 0 | Status: SHIPPED | OUTPATIENT
Start: 2020-12-28 | End: 2022-07-12 | Stop reason: ALTCHOICE

## 2021-01-03 ENCOUNTER — HOSPITAL ENCOUNTER (OUTPATIENT)
Dept: SLEEP CENTER | Facility: CLINIC | Age: 51
Discharge: HOME | End: 2021-01-03
Payer: COMMERCIAL

## 2021-01-03 DIAGNOSIS — G47.00 INSOMNIA, UNSPECIFIED TYPE: ICD-10-CM

## 2021-01-03 DIAGNOSIS — G25.81 RESTLESS LEG SYNDROME: ICD-10-CM

## 2021-01-03 DIAGNOSIS — G47.33 OBSTRUCTIVE SLEEP APNEA: ICD-10-CM

## 2021-01-03 PROCEDURE — 95810 POLYSOM 6/> YRS 4/> PARAM: CPT

## 2021-01-03 PROCEDURE — 95810 POLYSOM 6/> YRS 4/> PARAM: CPT | Performed by: INTERNAL MEDICINE

## 2021-01-04 NOTE — PROGRESS NOTES
Sleep Study Documentation    Pre-Sleep Study       Sleep testing procedure explained to patient:YES    Patient napped prior to study:NO    [address] [place] worker after 12PM   Caffeine use:NO    Alcohol:Dayshift workers after 5PM: Alcohol use:NO    Typical day for patient:YES       Study Documentation    Sleep Study Indications: Snoring, excessive daytime sleepiness, impaired concentration, and unrefreshing sleep  Sleep Study: Diagnostic   Snore: Moderate  Supplemental O2: no      Minimum SaO2 91%  Baseline SaO2 97%            EKG abnormalities: no     EEG abnormalities: no    Sleep Study Recorded < 2 hours: N/A    Sleep Study Recorded > 2 hours but incomplete study: N/A    Sleep Study Recorded 6 hours but no sleep obtained: NO    Patient classification: employed       Post-Sleep Study    Medication used at bedtime or during sleep study:YES over the counter sleep aid    Patient reports time it took to fall asleep:less than 20 minutes    Patient reports waking up during study:3 or more times  Patient reports returning to sleep in 10 to 30 minutes  Patient reports sleeping 6 to 8 hours without dreaming  Patient reports sleep during study:typical    Patient rated sleepiness: Somewhat sleepy or tired    PAP treatment:no

## 2021-01-05 ENCOUNTER — TELEPHONE (OUTPATIENT)
Dept: SLEEP CENTER | Facility: CLINIC | Age: 51
End: 2021-01-05

## 2021-01-05 NOTE — TELEPHONE ENCOUNTER
----- Message from Pepe Miller, [address] sent at 1/4/2021  4:19 PM EST -----  Diagnostic sleep study was normal   Patient to follow up to review study details and plan of treatment

## 2021-01-05 NOTE — TELEPHONE ENCOUNTER
Left detailed message, advised sleep study normal and will be discussed fully at visit scheduled 3/29/2021, advised to call office for sooner appointment or with questions or concerns

## 2021-01-18 ENCOUNTER — HOSPITAL ENCOUNTER (OUTPATIENT)
Dept: GASTROENTEROLOGY | Facility: AMBULARY SURGERY CENTER | Age: 51
Setting detail: OUTPATIENT SURGERY
Discharge: HOME | End: 2021-01-18

## 2021-01-18 RX ORDER — SODIUM CHLORIDE, SODIUM LACTATE, POTASSIUM CHLORIDE, CALCIUM CHLORIDE 600; 310; 30; 20 MG/100ML; MG/100ML; MG/100ML; MG/100ML
125 INJECTION, SOLUTION INTRAVENOUS CONTINUOUS
Status: CANCELLED | OUTPATIENT
Start: 2021-01-18

## 2021-03-24 ENCOUNTER — TELEPHONE (OUTPATIENT)
Dept: OBGYN CLINIC | Facility: CLINIC | Age: 51
End: 2021-03-24

## 2021-08-11 DIAGNOSIS — E78.00 PURE HYPERCHOLESTEROLEMIA: ICD-10-CM

## 2021-08-12 ENCOUNTER — TELEPHONE (OUTPATIENT)
Dept: FAMILY MEDICINE CLINIC | Facility: CLINIC | Age: 51
End: 2021-08-12

## 2021-08-12 RX ORDER — ATORVASTATIN CALCIUM 10 MG/1
TABLET, FILM COATED ORAL
Qty: 30 TABLET | Refills: 0 | Status: SHIPPED | OUTPATIENT
Start: 2021-08-12 | End: 2022-03-08

## 2021-08-12 NOTE — TELEPHONE ENCOUNTER
I called patient to advised her to please get her blood work done, a medication refill request came thru for atorvastatin I did approve it and placed a note that patient will only have a qty of 30 until she gets her blood work done  I did advise patient to call us back to explain better

## 2021-09-30 ENCOUNTER — APPOINTMENT (OUTPATIENT)
Dept: LAB | Facility: HOSPITAL | Age: 51
End: 2021-09-30
Attending: FAMILY MEDICINE
Payer: COMMERCIAL

## 2021-09-30 ENCOUNTER — TELEPHONE (OUTPATIENT)
Dept: FAMILY MEDICINE CLINIC | Facility: CLINIC | Age: 51
End: 2021-09-30

## 2021-09-30 ENCOUNTER — OFFICE VISIT (OUTPATIENT)
Dept: FAMILY MEDICINE CLINIC | Facility: CLINIC | Age: 51
End: 2021-09-30
Payer: COMMERCIAL

## 2021-09-30 ENCOUNTER — HOSPITAL ENCOUNTER (OUTPATIENT)
Dept: RADIOLOGY | Facility: HOSPITAL | Age: 51
Discharge: HOME | End: 2021-09-30
Attending: FAMILY MEDICINE
Payer: COMMERCIAL

## 2021-09-30 VITALS
RESPIRATION RATE: 16 BRPM | WEIGHT: 123 LBS | SYSTOLIC BLOOD PRESSURE: 110 MMHG | OXYGEN SATURATION: 99 % | BODY MASS INDEX: 23.22 KG/M2 | DIASTOLIC BLOOD PRESSURE: 72 MMHG | HEIGHT: 61 IN | TEMPERATURE: 97.3 F | HEART RATE: 57 BPM

## 2021-09-30 DIAGNOSIS — D47.2 MONOCLONAL GAMMOPATHY: ICD-10-CM

## 2021-09-30 DIAGNOSIS — M54.50 ACUTE BILATERAL LOW BACK PAIN WITHOUT SCIATICA: ICD-10-CM

## 2021-09-30 DIAGNOSIS — E78.00 PURE HYPERCHOLESTEROLEMIA: ICD-10-CM

## 2021-09-30 DIAGNOSIS — I10 ESSENTIAL HYPERTENSION: ICD-10-CM

## 2021-09-30 DIAGNOSIS — Z28.20 IMMUNIZATION NOT CARRIED OUT BECAUSE OF PATIENT DECISION: ICD-10-CM

## 2021-09-30 DIAGNOSIS — K52.9 CHRONIC DIARRHEA: ICD-10-CM

## 2021-09-30 DIAGNOSIS — Z79.899 ON STATIN THERAPY: ICD-10-CM

## 2021-09-30 DIAGNOSIS — Z00.00 WELL ADULT EXAM: Primary | ICD-10-CM

## 2021-09-30 DIAGNOSIS — R52 PAIN: ICD-10-CM

## 2021-09-30 DIAGNOSIS — Z12.11 SCREENING FOR COLON CANCER: ICD-10-CM

## 2021-09-30 DIAGNOSIS — I35.1 NONRHEUMATIC AORTIC VALVE INSUFFICIENCY: ICD-10-CM

## 2021-09-30 DIAGNOSIS — G47.00 INSOMNIA, UNSPECIFIED TYPE: ICD-10-CM

## 2021-09-30 DIAGNOSIS — R76.8 POSITIVE ANA (ANTINUCLEAR ANTIBODY): ICD-10-CM

## 2021-09-30 DIAGNOSIS — M79.652 PAIN OF LEFT THIGH: ICD-10-CM

## 2021-09-30 LAB
ALBUMIN SERPL BCP-MCNC: 4.6 G/DL (ref 3–5.2)
ALP SERPL-CCNC: 84 U/L (ref 43–122)
ALT SERPL W P-5'-P-CCNC: 38 U/L
ANION GAP SERPL CALCULATED.3IONS-SCNC: 7 MMOL/L (ref 5–14)
AST SERPL W P-5'-P-CCNC: 38 U/L (ref 14–36)
BACTERIA UR QL AUTO: ABNORMAL /HPF
BASOPHILS # BLD AUTO: 0 THOUSANDS/ΜL (ref 0–0.1)
BASOPHILS NFR BLD AUTO: 1 % (ref 0–1)
BILIRUB DIRECT SERPL-MCNC: 0.09 MG/DL
BILIRUB SERPL-MCNC: 0.41 MG/DL
BILIRUB UR QL STRIP: NEGATIVE
BUN SERPL-MCNC: 15 MG/DL (ref 5–25)
CALCIUM SERPL-MCNC: 9.7 MG/DL (ref 8.4–10.2)
CHLORIDE SERPL-SCNC: 105 MMOL/L (ref 97–108)
CHOLEST SERPL-MCNC: 162 MG/DL
CK MB SERPL-MCNC: 1.3 NG/ML (ref 0–2.4)
CK MB SERPL-MCNC: <1 % (ref 0–2.5)
CK SERPL-CCNC: 339 U/L (ref 30–135)
CLARITY UR: CLEAR
CO2 SERPL-SCNC: 29 MMOL/L (ref 22–30)
COLOR UR: YELLOW
CREAT SERPL-MCNC: 0.71 MG/DL (ref 0.6–1.2)
CRP SERPL QL: <5 MG/L
EOSINOPHIL # BLD AUTO: 0.1 THOUSAND/ΜL (ref 0–0.4)
EOSINOPHIL NFR BLD AUTO: 2 % (ref 0–6)
ERYTHROCYTE [DISTWIDTH] IN BLOOD BY AUTOMATED COUNT: 13.2 %
GFR SERPL CREATININE-BSD FRML MDRD: 114 ML/MIN/1.73SQ M
GLUCOSE P FAST SERPL-MCNC: 90 MG/DL (ref 70–99)
GLUCOSE UR STRIP-MCNC: NEGATIVE MG/DL
HCT VFR BLD AUTO: 39 % (ref 36–46)
HDLC SERPL-MCNC: 66 MG/DL
HGB BLD-MCNC: 12.8 G/DL (ref 12–16)
HGB UR QL STRIP.AUTO: 50
IGA SERPL-MCNC: 212 MG/DL (ref 70–400)
IGG SERPL-MCNC: 1350 MG/DL (ref 700–1600)
IGM SERPL-MCNC: 125 MG/DL (ref 40–230)
KETONES UR STRIP-MCNC: NEGATIVE MG/DL
LDLC SERPL CALC-MCNC: 82 MG/DL
LEUKOCYTE ESTERASE UR QL STRIP: NEGATIVE
LYMPHOCYTES # BLD AUTO: 1.7 THOUSANDS/ΜL (ref 0.5–4)
LYMPHOCYTES NFR BLD AUTO: 43 % (ref 25–45)
MCH RBC QN AUTO: 31.2 PG (ref 26–34)
MCHC RBC AUTO-ENTMCNC: 32.9 G/DL (ref 31–36)
MCV RBC AUTO: 95 FL (ref 80–100)
MONOCYTES # BLD AUTO: 0.3 THOUSAND/ΜL (ref 0.2–0.9)
MONOCYTES NFR BLD AUTO: 8 % (ref 1–10)
MUCOUS THREADS UR QL AUTO: ABNORMAL
NEUTROPHILS # BLD AUTO: 1.9 THOUSANDS/ΜL (ref 1.8–7.8)
NEUTS SEG NFR BLD AUTO: 47 % (ref 45–65)
NITRITE UR QL STRIP: NEGATIVE
NON-SQ EPI CELLS URNS QL MICRO: ABNORMAL /HPF
PH UR STRIP.AUTO: 6 [PH]
PLATELET # BLD AUTO: 187 THOUSANDS/UL (ref 150–450)
PMV BLD AUTO: 9.1 FL (ref 8.9–12.7)
POTASSIUM SERPL-SCNC: 4.1 MMOL/L (ref 3.6–5)
PROT SERPL-MCNC: 7.9 G/DL (ref 5.9–8.4)
PROT UR STRIP-MCNC: NEGATIVE MG/DL
RBC # BLD AUTO: 4.1 MILLION/UL (ref 4–5.2)
RBC #/AREA URNS AUTO: ABNORMAL /HPF
SODIUM SERPL-SCNC: 141 MMOL/L (ref 137–147)
SP GR UR STRIP.AUTO: 1.02 (ref 1–1.04)
TRIGL SERPL-MCNC: 71 MG/DL
TSH SERPL DL<=0.05 MIU/L-ACNC: 0.63 UIU/ML (ref 0.47–4.68)
UROBILINOGEN UA: NEGATIVE MG/DL
WBC # BLD AUTO: 4.1 THOUSAND/UL (ref 4.5–11)
WBC #/AREA URNS AUTO: ABNORMAL /HPF

## 2021-09-30 PROCEDURE — 82784 ASSAY IGA/IGD/IGG/IGM EACH: CPT

## 2021-09-30 PROCEDURE — 36415 COLL VENOUS BLD VENIPUNCTURE: CPT

## 2021-09-30 PROCEDURE — 83516 IMMUNOASSAY NONANTIBODY: CPT

## 2021-09-30 PROCEDURE — 99214 OFFICE O/P EST MOD 30 MIN: CPT | Performed by: FAMILY MEDICINE

## 2021-09-30 PROCEDURE — 84165 PROTEIN E-PHORESIS SERUM: CPT | Performed by: PATHOLOGY

## 2021-09-30 PROCEDURE — 73552 X-RAY EXAM OF FEMUR 2/>: CPT

## 2021-09-30 PROCEDURE — 80061 LIPID PANEL: CPT

## 2021-09-30 PROCEDURE — 84443 ASSAY THYROID STIM HORMONE: CPT

## 2021-09-30 PROCEDURE — 82550 ASSAY OF CK (CPK): CPT

## 2021-09-30 PROCEDURE — 82553 CREATINE MB FRACTION: CPT

## 2021-09-30 PROCEDURE — 99396 PREV VISIT EST AGE 40-64: CPT | Performed by: FAMILY MEDICINE

## 2021-09-30 PROCEDURE — 86255 FLUORESCENT ANTIBODY SCREEN: CPT

## 2021-09-30 PROCEDURE — 86038 ANTINUCLEAR ANTIBODIES: CPT

## 2021-09-30 PROCEDURE — 80053 COMPREHEN METABOLIC PANEL: CPT

## 2021-09-30 PROCEDURE — 84165 PROTEIN E-PHORESIS SERUM: CPT

## 2021-09-30 PROCEDURE — 73502 X-RAY EXAM HIP UNI 2-3 VIEWS: CPT

## 2021-09-30 PROCEDURE — 86334 IMMUNOFIX E-PHORESIS SERUM: CPT | Performed by: PATHOLOGY

## 2021-09-30 PROCEDURE — 81001 URINALYSIS AUTO W/SCOPE: CPT | Performed by: FAMILY MEDICINE

## 2021-09-30 PROCEDURE — 82248 BILIRUBIN DIRECT: CPT

## 2021-09-30 PROCEDURE — 72110 X-RAY EXAM L-2 SPINE 4/>VWS: CPT

## 2021-09-30 PROCEDURE — 85025 COMPLETE CBC W/AUTO DIFF WBC: CPT

## 2021-09-30 PROCEDURE — 86140 C-REACTIVE PROTEIN: CPT

## 2021-09-30 PROCEDURE — 86334 IMMUNOFIX E-PHORESIS SERUM: CPT

## 2021-09-30 PROCEDURE — 81003 URINALYSIS AUTO W/O SCOPE: CPT | Performed by: FAMILY MEDICINE

## 2021-09-30 NOTE — TELEPHONE ENCOUNTER
----- Message from Reji Peralta MD sent at 9/30/2021  5:22 PM EDT -----  Lab is remarkable for microscopic blood in the urine     Low white blood count  Abnormal liver function test and high a advised patient stop Lipitor    Check urine culture  Await rest of blood work  Recommend recheck liver function test and CK in 1 week

## 2021-10-01 ENCOUNTER — TELEPHONE (OUTPATIENT)
Dept: FAMILY MEDICINE CLINIC | Facility: CLINIC | Age: 51
End: 2021-10-01

## 2021-10-01 LAB
ENDOMYSIUM IGA SER QL: NEGATIVE
GLIADIN PEPTIDE IGA SER-ACNC: 3 UNITS (ref 0–19)
GLIADIN PEPTIDE IGG SER-ACNC: 2 UNITS (ref 0–19)
IGA SERPL-MCNC: 201 MG/DL (ref 87–352)
RYE IGE QN: NEGATIVE
TTG IGA SER-ACNC: <2 U/ML (ref 0–3)
TTG IGG SER-ACNC: <2 U/ML (ref 0–5)

## 2021-10-03 LAB
ALBUMIN SERPL ELPH-MCNC: 4.47 G/DL (ref 3.5–5)
ALBUMIN SERPL ELPH-MCNC: 59.6 % (ref 52–65)
ALPHA1 GLOB SERPL ELPH-MCNC: 0.29 G/DL (ref 0.1–0.4)
ALPHA1 GLOB SERPL ELPH-MCNC: 3.9 % (ref 2.5–5)
ALPHA2 GLOB SERPL ELPH-MCNC: 0.75 G/DL (ref 0.4–1.2)
ALPHA2 GLOB SERPL ELPH-MCNC: 10 % (ref 7–13)
BETA GLOB ABNORMAL SERPL ELPH-MCNC: 0.43 G/DL (ref 0.4–0.8)
BETA1 GLOB SERPL ELPH-MCNC: 5.7 % (ref 5–13)
BETA2 GLOB SERPL ELPH-MCNC: 4.2 % (ref 2–8)
BETA2+GAMMA GLOB SERPL ELPH-MCNC: 0.32 G/DL (ref 0.2–0.5)
GAMMA GLOB ABNORMAL SERPL ELPH-MCNC: 1.25 G/DL (ref 0.5–1.6)
GAMMA GLOB SERPL ELPH-MCNC: 16.6 % (ref 12–22)
IGG/ALB SER: 1.48 {RATIO} (ref 1.1–1.8)
INTERPRETATION UR IFE-IMP: NORMAL
M PROTEIN 1 MFR SERPL ELPH: 5.3 %
M PROTEIN 1 SERPL ELPH-MCNC: 0.4 G/DL
PROT PATTERN SERPL ELPH-IMP: NORMAL
PROT SERPL-MCNC: 7.5 G/DL (ref 6.4–8.2)

## 2021-10-05 ENCOUNTER — TELEPHONE (OUTPATIENT)
Dept: FAMILY MEDICINE CLINIC | Facility: CLINIC | Age: 51
End: 2021-10-05

## 2021-10-05 DIAGNOSIS — R74.8 ABNORMAL CK: ICD-10-CM

## 2021-10-05 DIAGNOSIS — R79.89 ELEVATED LIVER FUNCTION TESTS: Primary | ICD-10-CM

## 2021-10-06 ENCOUNTER — TELEPHONE (OUTPATIENT)
Dept: FAMILY MEDICINE CLINIC | Facility: CLINIC | Age: 51
End: 2021-10-06

## 2021-10-06 DIAGNOSIS — R82.90 ABNORMAL URINE FINDING: Primary | ICD-10-CM

## 2021-10-11 ENCOUNTER — TELEPHONE (OUTPATIENT)
Dept: FAMILY MEDICINE CLINIC | Facility: CLINIC | Age: 51
End: 2021-10-11

## 2021-10-13 ENCOUNTER — TELEPHONE (OUTPATIENT)
Dept: GASTROENTEROLOGY | Facility: CLINIC | Age: 51
End: 2021-10-13

## 2021-10-14 ENCOUNTER — APPOINTMENT (OUTPATIENT)
Dept: LAB | Facility: HOSPITAL | Age: 51
End: 2021-10-14
Attending: FAMILY MEDICINE
Payer: COMMERCIAL

## 2021-10-14 ENCOUNTER — OFFICE VISIT (OUTPATIENT)
Dept: FAMILY MEDICINE CLINIC | Facility: CLINIC | Age: 51
End: 2021-10-14
Payer: COMMERCIAL

## 2021-10-14 ENCOUNTER — HOSPITAL ENCOUNTER (OUTPATIENT)
Dept: RADIOLOGY | Facility: HOSPITAL | Age: 51
Discharge: HOME | End: 2021-10-14
Attending: FAMILY MEDICINE
Payer: COMMERCIAL

## 2021-10-14 VITALS
HEIGHT: 61 IN | BODY MASS INDEX: 23.41 KG/M2 | TEMPERATURE: 96.2 F | DIASTOLIC BLOOD PRESSURE: 76 MMHG | HEART RATE: 55 BPM | RESPIRATION RATE: 16 BRPM | WEIGHT: 124 LBS | SYSTOLIC BLOOD PRESSURE: 124 MMHG | OXYGEN SATURATION: 99 %

## 2021-10-14 DIAGNOSIS — G89.29 CHRONIC LEFT SHOULDER PAIN: ICD-10-CM

## 2021-10-14 DIAGNOSIS — R59.9 ENLARGED LYMPH NODE: ICD-10-CM

## 2021-10-14 DIAGNOSIS — R79.89 ELEVATED LIVER FUNCTION TESTS: ICD-10-CM

## 2021-10-14 DIAGNOSIS — R76.8 POSITIVE ANA (ANTINUCLEAR ANTIBODY): ICD-10-CM

## 2021-10-14 DIAGNOSIS — R74.8 ABNORMAL CK: ICD-10-CM

## 2021-10-14 DIAGNOSIS — R74.8 ABNORMAL CK: Primary | ICD-10-CM

## 2021-10-14 DIAGNOSIS — E78.00 PURE HYPERCHOLESTEROLEMIA: ICD-10-CM

## 2021-10-14 DIAGNOSIS — M25.512 CHRONIC LEFT SHOULDER PAIN: ICD-10-CM

## 2021-10-14 DIAGNOSIS — R82.90 ABNORMAL URINE FINDING: ICD-10-CM

## 2021-10-14 DIAGNOSIS — Z28.20 IMMUNIZATION NOT CARRIED OUT BECAUSE OF PATIENT DECISION: ICD-10-CM

## 2021-10-14 DIAGNOSIS — D47.2 MONOCLONAL GAMMOPATHY: ICD-10-CM

## 2021-10-14 DIAGNOSIS — Z12.31 ENCOUNTER FOR SCREENING MAMMOGRAM FOR MALIGNANT NEOPLASM OF BREAST: ICD-10-CM

## 2021-10-14 DIAGNOSIS — M54.50 ACUTE BILATERAL LOW BACK PAIN WITHOUT SCIATICA: ICD-10-CM

## 2021-10-14 DIAGNOSIS — M79.652 PAIN OF LEFT THIGH: ICD-10-CM

## 2021-10-14 LAB
ALBUMIN SERPL BCP-MCNC: 4.4 G/DL (ref 3–5.2)
ALP SERPL-CCNC: 85 U/L (ref 43–122)
ALT SERPL W P-5'-P-CCNC: 27 U/L
AST SERPL W P-5'-P-CCNC: 29 U/L (ref 14–36)
BILIRUB DIRECT SERPL-MCNC: 0.11 MG/DL
BILIRUB SERPL-MCNC: 0.43 MG/DL
CK MB SERPL-MCNC: 1.2 NG/ML (ref 0–2.4)
CK MB SERPL-MCNC: <1 % (ref 0–2.5)
CK SERPL-CCNC: 138 U/L (ref 30–135)
PROT SERPL-MCNC: 7.6 G/DL (ref 5.9–8.4)

## 2021-10-14 PROCEDURE — 82553 CREATINE MB FRACTION: CPT

## 2021-10-14 PROCEDURE — 73030 X-RAY EXAM OF SHOULDER: CPT

## 2021-10-14 PROCEDURE — 82550 ASSAY OF CK (CPK): CPT

## 2021-10-14 PROCEDURE — 36415 COLL VENOUS BLD VENIPUNCTURE: CPT

## 2021-10-14 PROCEDURE — 87086 URINE CULTURE/COLONY COUNT: CPT

## 2021-10-14 PROCEDURE — 99214 OFFICE O/P EST MOD 30 MIN: CPT | Performed by: FAMILY MEDICINE

## 2021-10-14 PROCEDURE — 71046 X-RAY EXAM CHEST 2 VIEWS: CPT

## 2021-10-14 PROCEDURE — 80076 HEPATIC FUNCTION PANEL: CPT

## 2021-10-15 ENCOUNTER — HOSPITAL ENCOUNTER (EMERGENCY)
Facility: HOSPITAL | Age: 51
Discharge: HOME | End: 2021-10-15
Attending: EMERGENCY MEDICINE | Admitting: EMERGENCY MEDICINE
Payer: COMMERCIAL

## 2021-10-15 VITALS
OXYGEN SATURATION: 100 % | DIASTOLIC BLOOD PRESSURE: 89 MMHG | HEART RATE: 59 BPM | BODY MASS INDEX: 23.65 KG/M2 | TEMPERATURE: 98.7 F | RESPIRATION RATE: 20 BRPM | WEIGHT: 125.19 LBS | SYSTOLIC BLOOD PRESSURE: 176 MMHG

## 2021-10-15 DIAGNOSIS — G89.29 CHRONIC LEFT SHOULDER PAIN: Primary | ICD-10-CM

## 2021-10-15 DIAGNOSIS — M75.02 ADHESIVE CAPSULITIS OF LEFT SHOULDER: ICD-10-CM

## 2021-10-15 DIAGNOSIS — M25.512 CHRONIC LEFT SHOULDER PAIN: Primary | ICD-10-CM

## 2021-10-15 LAB — BACTERIA UR CULT: NORMAL

## 2021-10-15 PROCEDURE — 99283 EMERGENCY DEPT VISIT LOW MDM: CPT

## 2021-10-15 PROCEDURE — 96372 THER/PROPH/DIAG INJ SC/IM: CPT

## 2021-10-15 PROCEDURE — 99284 EMERGENCY DEPT VISIT MOD MDM: CPT

## 2021-10-15 PROCEDURE — 93005 ELECTROCARDIOGRAM TRACING: CPT

## 2021-10-15 RX ORDER — LIDOCAINE 50 MG/G
1 PATCH TOPICAL ONCE
Status: DISCONTINUED | OUTPATIENT
Start: 2021-10-15 | End: 2021-10-15 | Stop reason: HOSPADM

## 2021-10-15 RX ORDER — KETOROLAC TROMETHAMINE 30 MG/ML
15 INJECTION, SOLUTION INTRAMUSCULAR; INTRAVENOUS ONCE
Status: COMPLETED | OUTPATIENT
Start: 2021-10-15 | End: 2021-10-15

## 2021-10-15 RX ORDER — CYCLOBENZAPRINE HCL 10 MG
10 TABLET ORAL 2 TIMES DAILY PRN
Qty: 20 TABLET | Refills: 0 | Status: SHIPPED | OUTPATIENT
Start: 2021-10-15 | End: 2022-03-08

## 2021-10-15 RX ORDER — PREDNISONE 20 MG/1
40 TABLET ORAL ONCE
Status: COMPLETED | OUTPATIENT
Start: 2021-10-15 | End: 2021-10-15

## 2021-10-15 RX ORDER — PREDNISONE 20 MG/1
20 TABLET ORAL DAILY
Qty: 4 TABLET | Refills: 0 | Status: SHIPPED | OUTPATIENT
Start: 2021-10-15 | End: 2021-10-19

## 2021-10-15 RX ADMIN — KETOROLAC TROMETHAMINE 15 MG: 30 INJECTION, SOLUTION INTRAMUSCULAR; INTRAVENOUS at 18:33

## 2021-10-15 RX ADMIN — PREDNISONE 40 MG: 20 TABLET ORAL at 18:33

## 2021-10-15 RX ADMIN — LIDOCAINE 1 PATCH: 50 PATCH TOPICAL at 18:33

## 2021-10-16 LAB
ATRIAL RATE: 47 BPM
P AXIS: 66 DEGREES
PR INTERVAL: 172 MS
QRS AXIS: 54 DEGREES
QRSD INTERVAL: 78 MS
QT INTERVAL: 434 MS
QTC INTERVAL: 384 MS
T WAVE AXIS: 33 DEGREES
VENTRICULAR RATE: 47 BPM

## 2021-10-16 PROCEDURE — 93010 ELECTROCARDIOGRAM REPORT: CPT | Performed by: INTERNAL MEDICINE

## 2021-10-18 ENCOUNTER — TELEPHONE (OUTPATIENT)
Dept: FAMILY MEDICINE CLINIC | Facility: CLINIC | Age: 51
End: 2021-10-18

## 2021-10-19 ENCOUNTER — TELEPHONE (OUTPATIENT)
Dept: FAMILY MEDICINE CLINIC | Facility: CLINIC | Age: 51
End: 2021-10-19

## 2021-10-19 DIAGNOSIS — R74.8 ABNORMAL CK: Primary | ICD-10-CM

## 2021-10-21 ENCOUNTER — TELEPHONE (OUTPATIENT)
Dept: FAMILY MEDICINE CLINIC | Facility: CLINIC | Age: 51
End: 2021-10-21

## 2021-10-21 ENCOUNTER — EVALUATION (OUTPATIENT)
Dept: PHYSICAL THERAPY | Facility: CLINIC | Age: 51
End: 2021-10-21
Payer: COMMERCIAL

## 2021-10-21 DIAGNOSIS — M79.652 PAIN OF LEFT THIGH: ICD-10-CM

## 2021-10-21 DIAGNOSIS — M54.50 ACUTE BILATERAL LOW BACK PAIN WITHOUT SCIATICA: ICD-10-CM

## 2021-10-21 DIAGNOSIS — M54.50 ACUTE BILATERAL LOW BACK PAIN, UNSPECIFIED WHETHER SCIATICA PRESENT: Primary | ICD-10-CM

## 2021-10-21 PROCEDURE — 97112 NEUROMUSCULAR REEDUCATION: CPT | Performed by: PHYSICAL THERAPIST

## 2021-10-21 PROCEDURE — 97162 PT EVAL MOD COMPLEX 30 MIN: CPT | Performed by: PHYSICAL THERAPIST

## 2021-10-26 ENCOUNTER — OFFICE VISIT (OUTPATIENT)
Dept: PHYSICAL THERAPY | Facility: CLINIC | Age: 51
End: 2021-10-26
Payer: COMMERCIAL

## 2021-10-26 DIAGNOSIS — M54.50 ACUTE BILATERAL LOW BACK PAIN WITHOUT SCIATICA: Primary | ICD-10-CM

## 2021-10-26 DIAGNOSIS — M54.50 ACUTE BILATERAL LOW BACK PAIN, UNSPECIFIED WHETHER SCIATICA PRESENT: ICD-10-CM

## 2021-10-26 DIAGNOSIS — M79.652 PAIN OF LEFT THIGH: ICD-10-CM

## 2021-10-26 PROCEDURE — 97112 NEUROMUSCULAR REEDUCATION: CPT

## 2021-10-26 PROCEDURE — 97110 THERAPEUTIC EXERCISES: CPT

## 2021-10-26 PROCEDURE — 97140 MANUAL THERAPY 1/> REGIONS: CPT

## 2021-10-28 ENCOUNTER — OFFICE VISIT (OUTPATIENT)
Dept: PHYSICAL THERAPY | Facility: CLINIC | Age: 51
End: 2021-10-28
Payer: COMMERCIAL

## 2021-10-28 DIAGNOSIS — M54.50 ACUTE BILATERAL LOW BACK PAIN, UNSPECIFIED WHETHER SCIATICA PRESENT: ICD-10-CM

## 2021-10-28 DIAGNOSIS — M54.50 ACUTE BILATERAL LOW BACK PAIN WITHOUT SCIATICA: Primary | ICD-10-CM

## 2021-10-28 DIAGNOSIS — M79.652 PAIN OF LEFT THIGH: ICD-10-CM

## 2021-10-28 PROCEDURE — 97112 NEUROMUSCULAR REEDUCATION: CPT | Performed by: PHYSICAL THERAPIST

## 2021-10-28 PROCEDURE — 97110 THERAPEUTIC EXERCISES: CPT | Performed by: PHYSICAL THERAPIST

## 2021-10-28 PROCEDURE — 97140 MANUAL THERAPY 1/> REGIONS: CPT | Performed by: PHYSICAL THERAPIST

## 2021-11-01 ENCOUNTER — TELEPHONE (OUTPATIENT)
Dept: FAMILY MEDICINE CLINIC | Facility: CLINIC | Age: 51
End: 2021-11-01

## 2021-11-01 DIAGNOSIS — M25.512 ACUTE PAIN OF LEFT SHOULDER: ICD-10-CM

## 2021-11-01 DIAGNOSIS — M24.00 JOINT LOOSE BODIES: Primary | ICD-10-CM

## 2021-11-02 ENCOUNTER — APPOINTMENT (OUTPATIENT)
Dept: PHYSICAL THERAPY | Facility: CLINIC | Age: 51
End: 2021-11-02
Payer: COMMERCIAL

## 2021-11-04 ENCOUNTER — APPOINTMENT (OUTPATIENT)
Dept: PHYSICAL THERAPY | Facility: CLINIC | Age: 51
End: 2021-11-04
Payer: COMMERCIAL

## 2021-11-09 ENCOUNTER — OFFICE VISIT (OUTPATIENT)
Dept: PHYSICAL THERAPY | Facility: CLINIC | Age: 51
End: 2021-11-09
Payer: COMMERCIAL

## 2021-11-09 DIAGNOSIS — M54.50 ACUTE BILATERAL LOW BACK PAIN WITHOUT SCIATICA: Primary | ICD-10-CM

## 2021-11-09 DIAGNOSIS — M54.50 ACUTE BILATERAL LOW BACK PAIN, UNSPECIFIED WHETHER SCIATICA PRESENT: ICD-10-CM

## 2021-11-09 DIAGNOSIS — M79.652 PAIN OF LEFT THIGH: ICD-10-CM

## 2021-11-09 PROCEDURE — 97140 MANUAL THERAPY 1/> REGIONS: CPT | Performed by: PHYSICAL THERAPIST

## 2021-11-09 PROCEDURE — 97110 THERAPEUTIC EXERCISES: CPT | Performed by: PHYSICAL THERAPIST

## 2021-11-10 ENCOUNTER — HOSPITAL ENCOUNTER (OUTPATIENT)
Dept: CT IMAGING | Facility: HOSPITAL | Age: 51
Discharge: HOME | End: 2021-11-10
Attending: FAMILY MEDICINE
Payer: COMMERCIAL

## 2021-11-10 DIAGNOSIS — M25.512 ACUTE PAIN OF LEFT SHOULDER: ICD-10-CM

## 2021-11-10 DIAGNOSIS — M24.00 JOINT LOOSE BODIES: ICD-10-CM

## 2021-11-10 PROCEDURE — 73200 CT UPPER EXTREMITY W/O DYE: CPT

## 2021-11-11 ENCOUNTER — OFFICE VISIT (OUTPATIENT)
Dept: PHYSICAL THERAPY | Facility: CLINIC | Age: 51
End: 2021-11-11
Payer: COMMERCIAL

## 2021-11-11 DIAGNOSIS — M54.50 ACUTE BILATERAL LOW BACK PAIN WITHOUT SCIATICA: Primary | ICD-10-CM

## 2021-11-11 DIAGNOSIS — M79.652 PAIN OF LEFT THIGH: ICD-10-CM

## 2021-11-11 PROCEDURE — 97112 NEUROMUSCULAR REEDUCATION: CPT

## 2021-11-11 PROCEDURE — 97140 MANUAL THERAPY 1/> REGIONS: CPT | Performed by: PHYSICAL THERAPIST

## 2021-11-11 PROCEDURE — 97140 MANUAL THERAPY 1/> REGIONS: CPT

## 2021-11-11 PROCEDURE — 97110 THERAPEUTIC EXERCISES: CPT

## 2021-11-16 ENCOUNTER — APPOINTMENT (OUTPATIENT)
Dept: PHYSICAL THERAPY | Facility: CLINIC | Age: 51
End: 2021-11-16
Payer: COMMERCIAL

## 2021-11-17 ENCOUNTER — TELEMEDICINE (OUTPATIENT)
Dept: FAMILY MEDICINE CLINIC | Facility: CLINIC | Age: 51
End: 2021-11-17
Payer: COMMERCIAL

## 2021-11-17 VITALS — TEMPERATURE: 98.3 F

## 2021-11-17 DIAGNOSIS — R79.89 ELEVATED LIVER FUNCTION TESTS: ICD-10-CM

## 2021-11-17 DIAGNOSIS — R31.29 MICROSCOPIC HEMATURIA: ICD-10-CM

## 2021-11-17 DIAGNOSIS — I10 ESSENTIAL HYPERTENSION: Primary | ICD-10-CM

## 2021-11-17 DIAGNOSIS — G89.29 CHRONIC LEFT SHOULDER PAIN: ICD-10-CM

## 2021-11-17 DIAGNOSIS — N93.9 VAGINAL BLEEDING: ICD-10-CM

## 2021-11-17 DIAGNOSIS — R74.8 ABNORMAL CK: ICD-10-CM

## 2021-11-17 DIAGNOSIS — M25.512 CHRONIC LEFT SHOULDER PAIN: ICD-10-CM

## 2021-11-17 DIAGNOSIS — M79.652 PAIN OF LEFT THIGH: ICD-10-CM

## 2021-11-17 DIAGNOSIS — R59.9 ENLARGED LYMPH NODE: ICD-10-CM

## 2021-11-17 DIAGNOSIS — Z12.11 SPECIAL SCREENING FOR MALIGNANT NEOPLASMS, COLON: ICD-10-CM

## 2021-11-17 PROCEDURE — 99214 OFFICE O/P EST MOD 30 MIN: CPT | Performed by: FAMILY MEDICINE

## 2021-11-18 ENCOUNTER — OFFICE VISIT (OUTPATIENT)
Dept: PHYSICAL THERAPY | Facility: CLINIC | Age: 51
End: 2021-11-18
Payer: COMMERCIAL

## 2021-11-18 DIAGNOSIS — M79.652 PAIN OF LEFT THIGH: ICD-10-CM

## 2021-11-18 DIAGNOSIS — M54.50 ACUTE BILATERAL LOW BACK PAIN, UNSPECIFIED WHETHER SCIATICA PRESENT: ICD-10-CM

## 2021-11-18 DIAGNOSIS — M54.50 ACUTE BILATERAL LOW BACK PAIN WITHOUT SCIATICA: Primary | ICD-10-CM

## 2021-11-18 PROCEDURE — 97110 THERAPEUTIC EXERCISES: CPT

## 2021-11-18 PROCEDURE — 97112 NEUROMUSCULAR REEDUCATION: CPT

## 2021-11-18 PROCEDURE — 97140 MANUAL THERAPY 1/> REGIONS: CPT | Performed by: PHYSICAL THERAPIST

## 2021-11-18 PROCEDURE — 97140 MANUAL THERAPY 1/> REGIONS: CPT

## 2021-11-19 PROBLEM — R31.29 MICROSCOPIC HEMATURIA: Status: ACTIVE | Noted: 2021-11-19

## 2021-11-19 PROBLEM — N93.9 VAGINAL BLEEDING: Status: ACTIVE | Noted: 2021-11-19

## 2021-11-19 PROBLEM — M25.512 CHRONIC LEFT SHOULDER PAIN: Status: ACTIVE | Noted: 2021-11-19

## 2021-11-19 PROBLEM — G89.29 CHRONIC LEFT SHOULDER PAIN: Status: ACTIVE | Noted: 2021-11-19

## 2021-11-19 PROBLEM — Z12.11 SPECIAL SCREENING FOR MALIGNANT NEOPLASMS, COLON: Status: ACTIVE | Noted: 2021-11-19

## 2021-11-30 ENCOUNTER — OFFICE VISIT (OUTPATIENT)
Dept: PHYSICAL THERAPY | Facility: CLINIC | Age: 51
End: 2021-11-30
Payer: COMMERCIAL

## 2021-11-30 DIAGNOSIS — M54.50 ACUTE BILATERAL LOW BACK PAIN, UNSPECIFIED WHETHER SCIATICA PRESENT: ICD-10-CM

## 2021-11-30 DIAGNOSIS — M54.50 ACUTE BILATERAL LOW BACK PAIN WITHOUT SCIATICA: Primary | ICD-10-CM

## 2021-11-30 DIAGNOSIS — M79.652 PAIN OF LEFT THIGH: ICD-10-CM

## 2021-11-30 PROCEDURE — 97110 THERAPEUTIC EXERCISES: CPT | Performed by: PHYSICAL THERAPIST

## 2021-11-30 PROCEDURE — 97112 NEUROMUSCULAR REEDUCATION: CPT | Performed by: PHYSICAL THERAPIST

## 2021-11-30 PROCEDURE — 97140 MANUAL THERAPY 1/> REGIONS: CPT | Performed by: PHYSICAL THERAPIST

## 2021-12-02 ENCOUNTER — OFFICE VISIT (OUTPATIENT)
Dept: PHYSICAL THERAPY | Facility: CLINIC | Age: 51
End: 2021-12-02
Payer: COMMERCIAL

## 2021-12-02 DIAGNOSIS — M54.50 ACUTE BILATERAL LOW BACK PAIN WITHOUT SCIATICA: Primary | ICD-10-CM

## 2021-12-02 DIAGNOSIS — M54.50 ACUTE BILATERAL LOW BACK PAIN, UNSPECIFIED WHETHER SCIATICA PRESENT: ICD-10-CM

## 2021-12-02 DIAGNOSIS — M79.652 PAIN OF LEFT THIGH: ICD-10-CM

## 2021-12-02 PROCEDURE — 97140 MANUAL THERAPY 1/> REGIONS: CPT | Performed by: PHYSICAL THERAPIST

## 2021-12-02 PROCEDURE — 97110 THERAPEUTIC EXERCISES: CPT | Performed by: PHYSICAL THERAPIST

## 2021-12-06 ENCOUNTER — OFFICE VISIT (OUTPATIENT)
Dept: PHYSICAL THERAPY | Facility: CLINIC | Age: 51
End: 2021-12-06
Payer: COMMERCIAL

## 2021-12-06 DIAGNOSIS — M79.652 PAIN OF LEFT THIGH: ICD-10-CM

## 2021-12-06 DIAGNOSIS — M54.50 ACUTE BILATERAL LOW BACK PAIN, UNSPECIFIED WHETHER SCIATICA PRESENT: ICD-10-CM

## 2021-12-06 DIAGNOSIS — M54.50 ACUTE BILATERAL LOW BACK PAIN WITHOUT SCIATICA: Primary | ICD-10-CM

## 2021-12-06 PROCEDURE — 97140 MANUAL THERAPY 1/> REGIONS: CPT | Performed by: PHYSICAL THERAPIST

## 2021-12-06 PROCEDURE — 97110 THERAPEUTIC EXERCISES: CPT | Performed by: PHYSICAL THERAPIST

## 2021-12-09 ENCOUNTER — OFFICE VISIT (OUTPATIENT)
Dept: PHYSICAL THERAPY | Facility: CLINIC | Age: 51
End: 2021-12-09
Payer: COMMERCIAL

## 2021-12-09 DIAGNOSIS — M54.50 ACUTE BILATERAL LOW BACK PAIN, UNSPECIFIED WHETHER SCIATICA PRESENT: ICD-10-CM

## 2021-12-09 DIAGNOSIS — M54.50 ACUTE BILATERAL LOW BACK PAIN WITHOUT SCIATICA: Primary | ICD-10-CM

## 2021-12-09 DIAGNOSIS — M79.652 PAIN OF LEFT THIGH: ICD-10-CM

## 2021-12-09 PROCEDURE — 97140 MANUAL THERAPY 1/> REGIONS: CPT | Performed by: PHYSICAL THERAPIST

## 2021-12-09 PROCEDURE — 97110 THERAPEUTIC EXERCISES: CPT | Performed by: PHYSICAL THERAPIST

## 2021-12-13 ENCOUNTER — OFFICE VISIT (OUTPATIENT)
Dept: PHYSICAL THERAPY | Facility: CLINIC | Age: 51
End: 2021-12-13
Payer: COMMERCIAL

## 2021-12-13 DIAGNOSIS — M54.50 ACUTE BILATERAL LOW BACK PAIN WITHOUT SCIATICA: Primary | ICD-10-CM

## 2021-12-13 DIAGNOSIS — M54.50 ACUTE BILATERAL LOW BACK PAIN, UNSPECIFIED WHETHER SCIATICA PRESENT: ICD-10-CM

## 2021-12-13 DIAGNOSIS — M79.652 PAIN OF LEFT THIGH: ICD-10-CM

## 2021-12-13 PROCEDURE — 97110 THERAPEUTIC EXERCISES: CPT | Performed by: PHYSICAL THERAPIST

## 2021-12-13 PROCEDURE — 97140 MANUAL THERAPY 1/> REGIONS: CPT | Performed by: PHYSICAL THERAPIST

## 2021-12-16 ENCOUNTER — APPOINTMENT (OUTPATIENT)
Dept: PHYSICAL THERAPY | Facility: CLINIC | Age: 51
End: 2021-12-16
Payer: COMMERCIAL

## 2021-12-20 ENCOUNTER — APPOINTMENT (OUTPATIENT)
Dept: PHYSICAL THERAPY | Facility: CLINIC | Age: 51
End: 2021-12-20
Payer: COMMERCIAL

## 2021-12-21 ENCOUNTER — OFFICE VISIT (OUTPATIENT)
Dept: PHYSICAL THERAPY | Facility: CLINIC | Age: 51
End: 2021-12-21
Payer: COMMERCIAL

## 2021-12-21 DIAGNOSIS — M54.50 ACUTE BILATERAL LOW BACK PAIN, UNSPECIFIED WHETHER SCIATICA PRESENT: ICD-10-CM

## 2021-12-21 DIAGNOSIS — M54.50 ACUTE BILATERAL LOW BACK PAIN WITHOUT SCIATICA: Primary | ICD-10-CM

## 2021-12-21 DIAGNOSIS — M79.652 PAIN OF LEFT THIGH: ICD-10-CM

## 2021-12-21 PROCEDURE — 97110 THERAPEUTIC EXERCISES: CPT | Performed by: PHYSICAL THERAPIST

## 2021-12-21 PROCEDURE — 97140 MANUAL THERAPY 1/> REGIONS: CPT | Performed by: PHYSICAL THERAPIST

## 2021-12-21 PROCEDURE — 97112 NEUROMUSCULAR REEDUCATION: CPT | Performed by: PHYSICAL THERAPIST

## 2021-12-23 ENCOUNTER — APPOINTMENT (OUTPATIENT)
Dept: PHYSICAL THERAPY | Facility: CLINIC | Age: 51
End: 2021-12-23
Payer: COMMERCIAL

## 2021-12-28 ENCOUNTER — OFFICE VISIT (OUTPATIENT)
Dept: PHYSICAL THERAPY | Facility: CLINIC | Age: 51
End: 2021-12-28
Payer: COMMERCIAL

## 2021-12-28 DIAGNOSIS — M54.50 ACUTE BILATERAL LOW BACK PAIN WITHOUT SCIATICA: Primary | ICD-10-CM

## 2021-12-28 PROCEDURE — 97110 THERAPEUTIC EXERCISES: CPT

## 2021-12-28 PROCEDURE — 97140 MANUAL THERAPY 1/> REGIONS: CPT

## 2022-01-03 ENCOUNTER — APPOINTMENT (OUTPATIENT)
Dept: PHYSICAL THERAPY | Facility: CLINIC | Age: 52
End: 2022-01-03
Payer: COMMERCIAL

## 2022-01-04 ENCOUNTER — OFFICE VISIT (OUTPATIENT)
Dept: PHYSICAL THERAPY | Facility: CLINIC | Age: 52
End: 2022-01-04
Payer: COMMERCIAL

## 2022-01-04 DIAGNOSIS — M54.50 ACUTE BILATERAL LOW BACK PAIN, UNSPECIFIED WHETHER SCIATICA PRESENT: ICD-10-CM

## 2022-01-04 DIAGNOSIS — M54.50 ACUTE BILATERAL LOW BACK PAIN WITHOUT SCIATICA: Primary | ICD-10-CM

## 2022-01-04 DIAGNOSIS — M79.652 PAIN OF LEFT THIGH: ICD-10-CM

## 2022-01-04 PROCEDURE — 97110 THERAPEUTIC EXERCISES: CPT | Performed by: PHYSICAL THERAPIST

## 2022-01-04 PROCEDURE — 97112 NEUROMUSCULAR REEDUCATION: CPT | Performed by: PHYSICAL THERAPIST

## 2022-01-04 PROCEDURE — 97140 MANUAL THERAPY 1/> REGIONS: CPT | Performed by: PHYSICAL THERAPIST

## 2022-01-04 NOTE — PROGRESS NOTES
Daily Note     Today's date: 2022  Patient name: Basil Estrada  : 1970  MRN: [id number]  Referring provider: Donelda Fabry, MD  Dx:   Encounter Diagnosis     ICD-10-CM    1  Acute bilateral low back pain without sciatica  M54 50    2  Pain of left thigh  M79 652    3  Acute bilateral low back pain, unspecified whether sciatica present  M54 50                   Subjective: Patient reports that she is feeling better, around 35%  No new complaints  Objective: See treatment diary below     Precautions: none     Manuals     L glute STM                    L coccyx FMT Prone knee flexion with L coccyx, s/l L hip flexed w/ posterior mob resistance, & hip ER/IR Prone knee flexion with L coccyx, s/l L hip flexed w/ posterior mob resistance, & hip ER/IR Prone knee flexion with L coccyx, s/l L hip flexed w/ posterior mob resistance, & hip ER/IR Prone knee flexion with L coccyx, s/l L hip flexed w/ posterior mob resistance, & hip ER/IR Prone knee flexion with L coccyx, s/l L hip flexed w/ posterior mob resistance, & hip ER/IR Prone knee flexion with L coccyx, s/l L hip flexed w/ posterior mob resistance, & hip ER/IR                        Ther Ex          UBE/bike w/u Stationary 5 min  Treadmill 5 min Stationary 5" Treadmill 5 min Bike 5' Treadmill 5 min    Resisted hip  Depression * 2x10 2x12 2x12 3x10  3x10 3x10     S/l ext rotation neural stretch * 10 10" holds  10 10" holds 10 10" holds 10x 10" holds  10"x10 10x 10" holds    Seated hip ER/IR          Seated self L coccyx mob *                                            Assessment: Tolerated treatment well  Patient demonstrated fatigue post treatment, exhibited good technique with therapeutic exercises and would benefit from continued PT  Plan: Continue per plan of care  Progress treatment as tolerated      Emil Dunbar PT

## 2022-01-06 ENCOUNTER — OFFICE VISIT (OUTPATIENT)
Dept: PHYSICAL THERAPY | Facility: CLINIC | Age: 52
End: 2022-01-06
Payer: COMMERCIAL

## 2022-01-06 DIAGNOSIS — M79.652 PAIN OF LEFT THIGH: ICD-10-CM

## 2022-01-06 DIAGNOSIS — M54.50 ACUTE BILATERAL LOW BACK PAIN, UNSPECIFIED WHETHER SCIATICA PRESENT: ICD-10-CM

## 2022-01-06 DIAGNOSIS — M54.50 ACUTE BILATERAL LOW BACK PAIN WITHOUT SCIATICA: Primary | ICD-10-CM

## 2022-01-06 PROCEDURE — 97112 NEUROMUSCULAR REEDUCATION: CPT | Performed by: PHYSICAL THERAPIST

## 2022-01-06 PROCEDURE — 97110 THERAPEUTIC EXERCISES: CPT | Performed by: PHYSICAL THERAPIST

## 2022-01-06 PROCEDURE — 97140 MANUAL THERAPY 1/> REGIONS: CPT | Performed by: PHYSICAL THERAPIST

## 2022-01-06 NOTE — PROGRESS NOTES
Daily Note     Today's date: 2022  Patient name: Cezar East  : 1970  MRN: [id number]  Referring provider: Harjit Torres MD  Dx:   Encounter Diagnosis     ICD-10-CM    1  Acute bilateral low back pain without sciatica  M54 50    2  Pain of left thigh  M79 652    3  Acute bilateral low back pain, unspecified whether sciatica present  M54 50                   Subjective: Patient reports that she is feeling better, around 40%  No new complaints  Objective: See treatment diary below     Precautions: none     Manuals    L glute STM                    L coccyx FMT Prone knee flexion with L coccyx, s/l L hip flexed w/ posterior mob resistance, & hip ER/IR Prone knee flexion with L coccyx, s/l L hip flexed w/ posterior mob resistance, & hip ER/IR Prone knee flexion with L coccyx, s/l L hip flexed w/ posterior mob resistance, & hip ER/IR Prone knee flexion with L coccyx, s/l L hip flexed w/ posterior mob resistance, & hip ER/IR Prone knee flexion with L coccyx, s/l L hip flexed w/ posterior mob resistance, & hip ER/IR Prone knee flexion with L coccyx, s/l L hip flexed w/ posterior mob resistance, & hip ER/IR Prone knee flexion with L coccyx, s/l L hip flexed w/ posterior mob resistance, & hip ER/IR                       Ther Ex          UBE/bike w/u Stationary 5 min  Treadmill 5 min Stationary 5" Treadmill 5 min Bike 5' Treadmill 5 min Recumbent bike 5 min   Resisted hip  Depression * 2x10 2x12 2x12 3x10  3x10 3x10  3x10   S/l ext rotation neural stretch * 10 10" holds  10 10" holds 10 10" holds 10x 10" holds  10"x10 10x 10" holds 10x 10" holds    Seated hip ER/IR          Seated self L coccyx mob *                                            Assessment: Tolerated treatment well  Patient demonstrated fatigue post treatment, exhibited good technique with therapeutic exercises and would benefit from continued PT  Plan: Continue per plan of care    Progress treatment as tolerated      Emil Plaster, PT

## 2022-01-10 ENCOUNTER — OFFICE VISIT (OUTPATIENT)
Dept: PHYSICAL THERAPY | Facility: CLINIC | Age: 52
End: 2022-01-10
Payer: COMMERCIAL

## 2022-01-10 DIAGNOSIS — M54.50 ACUTE BILATERAL LOW BACK PAIN, UNSPECIFIED WHETHER SCIATICA PRESENT: ICD-10-CM

## 2022-01-10 DIAGNOSIS — M54.50 ACUTE BILATERAL LOW BACK PAIN WITHOUT SCIATICA: Primary | ICD-10-CM

## 2022-01-10 DIAGNOSIS — M79.652 PAIN OF LEFT THIGH: ICD-10-CM

## 2022-01-10 PROCEDURE — 97140 MANUAL THERAPY 1/> REGIONS: CPT | Performed by: PHYSICAL THERAPIST

## 2022-01-10 PROCEDURE — 97110 THERAPEUTIC EXERCISES: CPT | Performed by: PHYSICAL THERAPIST

## 2022-01-10 PROCEDURE — 97112 NEUROMUSCULAR REEDUCATION: CPT | Performed by: PHYSICAL THERAPIST

## 2022-01-10 NOTE — PROGRESS NOTES
Daily Note     Today's date: 1/10/2022  Patient name: Deisy Wharton  : 1970  MRN: [id number]  Referring provider: Polly Cr MD  Dx:   Encounter Diagnosis     ICD-10-CM    1  Acute bilateral low back pain without sciatica  M54 50    2  Pain of left thigh  M79 652    3  Acute bilateral low back pain, unspecified whether sciatica present  M54 50                   Subjective: Patient reports that she is feeling better, around 40%  No new complaints  Objective: See treatment diary below     Precautions: none     Manuals 12/13 12/21 12/28 1/4 1/6 1/10   L glute STM                  L coccyx FMT Prone knee flexion with L coccyx, s/l L hip flexed w/ posterior mob resistance, & hip ER/IR Prone knee flexion with L coccyx, s/l L hip flexed w/ posterior mob resistance, & hip ER/IR Prone knee flexion with L coccyx, s/l L hip flexed w/ posterior mob resistance, & hip ER/IR Prone knee flexion with L coccyx, s/l L hip flexed w/ posterior mob resistance, & hip ER/IR Prone knee flexion with L coccyx, s/l L hip flexed w/ posterior mob resistance, & hip ER/IR Prone knee flexion with L coccyx, s/l L hip flexed w/ posterior mob resistance, & hip ER/IR                     Ther Ex         UBE/bike w/u Stationary 5" Treadmill 5 min Bike 5' Treadmill 5 min Recumbent bike 5 min Treadmill 5 min    Resisted hip  Depression * 2x12 3x10  3x10 3x10  3x10 3x12   S/l ext rotation neural stretch * 10 10" holds 10x 10" holds  10"x10 10x 10" holds 10x 10" holds  10x 10" holds    Seated hip ER/IR         Seated self L coccyx mob *                                        Assessment: Tolerated treatment well  Patient demonstrated fatigue post treatment, exhibited good technique with therapeutic exercises and would benefit from continued PT  Plan: Continue per plan of care  Progress treatment as tolerated      Kim Valencia, PT

## 2022-01-13 ENCOUNTER — APPOINTMENT (OUTPATIENT)
Dept: PHYSICAL THERAPY | Facility: CLINIC | Age: 52
End: 2022-01-13
Payer: COMMERCIAL

## 2022-01-18 ENCOUNTER — OFFICE VISIT (OUTPATIENT)
Dept: PHYSICAL THERAPY | Facility: CLINIC | Age: 52
End: 2022-01-18
Payer: COMMERCIAL

## 2022-01-18 DIAGNOSIS — M54.50 ACUTE BILATERAL LOW BACK PAIN, UNSPECIFIED WHETHER SCIATICA PRESENT: ICD-10-CM

## 2022-01-18 DIAGNOSIS — M79.652 PAIN OF LEFT THIGH: ICD-10-CM

## 2022-01-18 DIAGNOSIS — M54.50 ACUTE BILATERAL LOW BACK PAIN WITHOUT SCIATICA: Primary | ICD-10-CM

## 2022-01-18 PROCEDURE — 97110 THERAPEUTIC EXERCISES: CPT | Performed by: PHYSICAL THERAPIST

## 2022-01-18 PROCEDURE — 97140 MANUAL THERAPY 1/> REGIONS: CPT | Performed by: PHYSICAL THERAPIST

## 2022-01-18 PROCEDURE — 97112 NEUROMUSCULAR REEDUCATION: CPT | Performed by: PHYSICAL THERAPIST

## 2022-01-18 NOTE — PROGRESS NOTES
Daily Note     Today's date: 2022  Patient name: Rupesh Roberts  : 1970  MRN: [id number]  Referring provider: Mamadou Rodriguez MD  Dx:   Encounter Diagnosis     ICD-10-CM    1  Acute bilateral low back pain without sciatica  M54 50    2  Pain of left thigh  M79 652    3  Acute bilateral low back pain, unspecified whether sciatica present  M54 50                   Subjective: Patient reports hardly feels the pain in her coccyx now  Still has pain in her back when she sits for awhile  Objective: See treatment diary below     Precautions: none     Manuals 12/21 12/28 1/4 1/6 1/10 1/18   L glute STM                  L coccyx FMT Prone knee flexion with L coccyx, s/l L hip flexed w/ posterior mob resistance, & hip ER/IR Prone knee flexion with L coccyx, s/l L hip flexed w/ posterior mob resistance, & hip ER/IR Prone knee flexion with L coccyx, s/l L hip flexed w/ posterior mob resistance, & hip ER/IR Prone knee flexion with L coccyx, s/l L hip flexed w/ posterior mob resistance, & hip ER/IR Prone knee flexion with L coccyx, s/l L hip flexed w/ posterior mob resistance, & hip ER/IR Prone knee flexion with L coccyx, s/l L hip flexed w/ posterior mob resistance, & hip ER/IR                     Ther Ex         UBE/bike w/u Treadmill 5 min Bike 5' Treadmill 5 min Recumbent bike 5 min Treadmill 5 min  Treadmill 5 min   Resisted hip  Depression * 3x10  3x10 3x10  3x10 3x12 3x12   S/l ext rotation neural stretch * 10x 10" holds  10"x10 10x 10" holds 10x 10" holds  10x 10" holds  10x 10" holds    Seated hip ER/IR         Seated self L coccyx mob *                  Prone press ups w/ exhale      10x                 Assessment: Tolerated treatment well  Patient demonstrated fatigue post treatment, exhibited good technique with therapeutic exercises and would benefit from continued PT  Plan: Continue per plan of care  Progress treatment as tolerated      Kristy Booker PT

## 2022-01-20 ENCOUNTER — APPOINTMENT (OUTPATIENT)
Dept: PHYSICAL THERAPY | Facility: CLINIC | Age: 52
End: 2022-01-20
Payer: COMMERCIAL

## 2022-01-25 ENCOUNTER — OFFICE VISIT (OUTPATIENT)
Dept: PHYSICAL THERAPY | Facility: CLINIC | Age: 52
End: 2022-01-25
Payer: COMMERCIAL

## 2022-01-25 DIAGNOSIS — M54.50 ACUTE BILATERAL LOW BACK PAIN WITHOUT SCIATICA: Primary | ICD-10-CM

## 2022-01-25 DIAGNOSIS — M79.652 PAIN OF LEFT THIGH: ICD-10-CM

## 2022-01-25 DIAGNOSIS — M54.50 ACUTE BILATERAL LOW BACK PAIN, UNSPECIFIED WHETHER SCIATICA PRESENT: ICD-10-CM

## 2022-01-25 PROCEDURE — 97110 THERAPEUTIC EXERCISES: CPT

## 2022-01-25 PROCEDURE — 97140 MANUAL THERAPY 1/> REGIONS: CPT

## 2022-01-25 NOTE — PROGRESS NOTES
Daily Note     Today's date: 2022  Patient name: Prisicla Philip  : 1970  MRN: [id number]  Referring provider: Tamika Saha MD  Dx:   Encounter Diagnosis     ICD-10-CM    1  Acute bilateral low back pain without sciatica  M54 50    2  Pain of left thigh  M79 652    3  Acute bilateral low back pain, unspecified whether sciatica present  M54 50                 Subjective: Pt reports that she is 60% improved  Objective: See treatment diary below     Precautions: none     Manuals 12/21 12/28 1/4 1/6 1/10 1/18 1/25     L glute STM                        L coccyx FMT Prone knee flexion with L coccyx, s/l L hip flexed w/ posterior mob resistance, & hip ER/IR Prone knee flexion with L coccyx, s/l L hip flexed w/ posterior mob resistance, & hip ER/IR Prone knee flexion with L coccyx, s/l L hip flexed w/ posterior mob resistance, & hip ER/IR Prone knee flexion with L coccyx, s/l L hip flexed w/ posterior mob resistance, & hip ER/IR Prone knee flexion with L coccyx, s/l L hip flexed w/ posterior mob resistance, & hip ER/IR Prone knee flexion with L coccyx, s/l L hip flexed w/ posterior mob resistance, & hip ER/IR Prone knee flexion with L coccyx 2x10,  s/l L hip flexed w/ posterior mob resistance, & hip ER/IR                             Ther Ex       UBE/bike w/u Treadmill 5 min Bike 5' Treadmill 5 min Recumbent bike 5 min Treadmill 5 min  Treadmill 5 min TM 5'     Resisted hip  Depression * 3x10  3x10 3x10  3x10 3x12 3x12 3x12     S/l ext rotation neural stretch * 10x 10" holds  10"x10 10x 10" holds 10x 10" holds  10x 10" holds  10x 10" holds  10"x10     Seated hip ER/IR            Seated self L coccyx mob *                        Prone press ups w/ exhale      10x  10x                     Assessment: Tolerated treatment well  Patient demonstrated fatigue post treatment, exhibited good technique with therapeutic exercises and would benefit from continued PT    Plan: Continue per plan of care  Progress treatment as tolerated      Lb Rivera, PTA

## 2022-02-01 ENCOUNTER — OFFICE VISIT (OUTPATIENT)
Dept: UROLOGY | Facility: CLINIC | Age: 52
End: 2022-02-01
Payer: COMMERCIAL

## 2022-02-01 VITALS
HEART RATE: 67 BPM | WEIGHT: 121.4 LBS | SYSTOLIC BLOOD PRESSURE: 126 MMHG | BODY MASS INDEX: 22.92 KG/M2 | HEIGHT: 61 IN | DIASTOLIC BLOOD PRESSURE: 70 MMHG

## 2022-02-01 DIAGNOSIS — R31.29 MICROSCOPIC HEMATURIA: Primary | ICD-10-CM

## 2022-02-01 LAB
SL AMB  POCT GLUCOSE, UA: ABNORMAL
SL AMB LEUKOCYTE ESTERASE,UA: ABNORMAL
SL AMB POCT BILIRUBIN,UA: ABNORMAL
SL AMB POCT BLOOD,UA: ABNORMAL
SL AMB POCT CLARITY,UA: CLEAR
SL AMB POCT COLOR,UA: ABNORMAL
SL AMB POCT KETONES,UA: ABNORMAL
SL AMB POCT NITRITE,UA: ABNORMAL
SL AMB POCT PH,UA: 6
SL AMB POCT SPECIFIC GRAVITY,UA: 1.03
SL AMB POCT URINE PROTEIN: ABNORMAL
SL AMB POCT UROBILINOGEN: 0.2

## 2022-02-01 PROCEDURE — 99242 OFF/OP CONSLTJ NEW/EST SF 20: CPT | Performed by: NURSE PRACTITIONER

## 2022-02-01 PROCEDURE — 81002 URINALYSIS NONAUTO W/O SCOPE: CPT | Performed by: NURSE PRACTITIONER

## 2022-02-01 NOTE — PATIENT INSTRUCTIONS
BLADDER HEALTH    WHAT IS CONSIDERED NORMAL?  The average bladder can hold about 2 cups of urine before it needs to be emptied   The normal range of voiding urine is 6 to 8 times during a 24 hour period  As we get older, our bladder capacity can get smaller and we may need to pass urine more frequently but usually not more than every 2 hours   Urine should flow easily without discomfort in a good, steady stream until the bladder is empty  No pushing or straining is necessary to empty the bladder   An urge is a signal that you feel as the bladder stretches to fill with urine  Urges can be felt even if the bladder is not full  Urges are not commands to go to the toilet, merely a signal and can be controlled  WHAT ARE GOOD BLADDER HABITS?  Take your time when emptying your bladder  Dont strain or push to empty your bladder  Make sure you empty your bladder completely each time you pass urine  Do not rush the process   Consistently ignoring the urge to go (waiting more than 4 hours between toileting) or urinating too infrequently may be convenient but not healthy for your bladder   Avoid going to the toilet just in case or more often than every 2 hours  It is usually not necessary to go when you feel the first urge  Try to go only when your bladder is full  Urgency and frequency of urination can be improved by retraining the bladder and spacing your fluid intake throughout the day  Practice good toilet habits  Dont let your bladder control your life  TIPS TO MAINTAIN GOOD BLADDER HABITS   Maintain a good fluid intake  Depending on your body size and environment, drink 6 -8 cups (8 oz each) of fluid per day unless otherwise advised by your doctor  Not enough fluid creates a foul odor and dark color of the urine     Limit the amount of caffeine (coffee, cola, chocolate or tea) and citrus foods that you consume as these foods can be associated with increased sensation of urinary urgency and frequency   Limit the amount of alcohol you drink  Alcohol increases urine production and makes it difficult for the brain to coordinate bladder control   Avoid constipation by maintaining a balanced diet of dietary fiber   Cigarette smoking is also irritating to the bladder surface and is associated with bladder cancer  In addition, the coughing associated with smoking may lead to increased incontinent episodes because of the increased pressure  HOW DIET CAN AFFECT YOUR BLADDER  Although there is no particular "diet" that can cure bladder control, there are certain dietary suggestions you can use to help control the problem  There are 2 points to consider when evaluating how your habits and diet may affect your bladder:    1  Foods and fluids can irritate the bladder  Some foods and beverages are thought to contribute to bladder leakage and irritability  However their effect on the bladder is not completely understood and you may want to see if eliminating one or all of these items improves your bladder control  If you are unable to give them up completely, it is recommended that you use the following items in moderation:   Acidic beverages and foods (orange juice, grapefruit juice, lemonade etc)   Alcoholic beverages   Vinegar   Coffee (regular and decaf)   Tea (regular and decaf)   Caffeinated beverages   Carbonated beverages          2  Drinking enough and the right kinds of fluids  Many people with bladder control issues decrease their intake of liquids in hope that they will need to urinate less frequently or have less urinary leakage   You should not restrict fluids to control your bladder  While a decrease in liquid intake does result in a decrease in the volume of urine, the smaller amount of urine may be more highly concentrated         Highly concentrated, dark yellow urine is irritating to the bladder surface and may actually cause you to go to the bathroom more frequently   It also encourages the growth of bacteria, which may lead to infections resulting in incontinence   Substitutions for Bladder Irritants: water is always the best beverage choice  Grape and apple juice are thirst quenchers are good selections and are not as irritating to the bladder   o Low acid fruits:  Pears, apricots, papaya, watermelon  o For coffee drinkers: KAVA®, Postum®, Leigh Ann®, Kaffree Shanice®  o For tea drinkers:  non-citrus or herbal and sun brewed tea        Hematuria   WHAT YOU NEED TO KNOW:   Hematuria is blood in your urine  Your urine may be bright red to dark brown  DISCHARGE INSTRUCTIONS:   Return to the emergency department if:   · You have blood in your urine after a new injury, such as a fall  · You have severe back or side pain that does not go away with treatment  Call your doctor if:   · You are urinating very small amounts or not at all  · You feel like you cannot empty your bladder  · You have a fever that gets worse or does not go away with treatment  · You cannot keep liquids or medicines down  · Your urine gets darker, even after you drink extra liquids  · You have questions or concerns about your condition, treatment, or care  Drink liquids as directed: You may need to drink extra liquids to help flush the blood from your body through your urine  Water is the best liquid to drink  Ask how much liquid to drink each day and which liquids are best for you  Follow up with your doctor as directed:  Write down your questions so you remember to ask them during your visits  © Copyright [employer] 2021 Information is for End User's use only and may not be sold, redistributed or otherwise used for commercial purposes  All illustrations and images included in CareNotes® are the copyrighted property of A D A M , Inc  or [facility] Lexy Johnson   The above information is an  only   It is not intended as medical advice for individual conditions or treatments  Talk to your doctor, nurse or pharmacist before following any medical regimen to see if it is safe and effective for you

## 2022-02-01 NOTE — ASSESSMENT & PLAN NOTE
· Urine testing with 0-2 RBC/hpf  · Urine microscopic ordered  · Follow up 6 months for recheck  · Recommend microscopic hematuria workup for any true microscopic hematuria

## 2022-02-01 NOTE — PROGRESS NOTES
Assessment and plan:     Microscopic hematuria  · Urine testing with 0-2 RBC/hpf  · Urine microscopic ordered  · Follow up 6 months for recheck  · Recommend microscopic hematuria workup for any true microscopic hematuria          WALTER Ledesma    History of Present Illness     Justyna Corrales is a 46 y o  new patient presents for microscopic hematuria  She has had urine testing on 2 separate occasions that revealed 0-1 and 1-2 RBCs/hpf  Both with occasional epithelial cells and occasional bacteria  Her urine culture did not reveal any growth 10/14/2021  She is urine cytology ordered by her PCP but not completed  She was also ordered an ultrasound of her kidney bladder by her PCP which was not yet completed  She denies any gross hematuria  She reports beomg perimenopausal and has been having vaginal spotting intermittently  Denies smoking history or chemical exposure  She has previously been on Myrbetriq for urinary frequency and urgency  This has improved  She reports loose bowels and diarrhea daily  She has taken probiotics for this  She had multiple stool tests  She is following with gastroenterology  She did not follow up for her colonoscopy  She did note increased UTI about a year ago after sexual intercourse  This has since resolved  Has IUD placed 2020  Denies /GYN procedures  No hx of kidney stones   Denies family hx of kidney or bladder cancer    Laboratory     Lab Results   Component Value Date    BUN 15 09/30/2021    CREATININE 0 71 09/30/2021       No components found for: GFR    Lab Results   Component Value Date    CALCIUM 9 7 09/30/2021    K 4 1 09/30/2021    CO2 29 09/30/2021     09/30/2021       Lab Results   Component Value Date    WBC 4 10 (L) 09/30/2021    HGB 12 8 09/30/2021    HCT 39 0 09/30/2021    MCV 95 09/30/2021     09/30/2021       No results found for: PSA    Recent Results (from the past 1 hour(s))   POCT urine dip    Collection Time: 02/01/22 11:56 AM Result Value Ref Range    LEUKOCYTE ESTERASE,UA NEG     NITRITE,UA NEG     SL AMB POCT UROBILINOGEN 0 2     POCT URINE PROTEIN TRACE      PH,UA 6 0     BLOOD,UA MODEATE     SPECIFIC GRAVITY,UA 1 030     KETONES,UA NEG     BILIRUBIN,UA NEG     GLUCOSE, UA NEG      COLOR,UA LT ORANGE     CLARITY,UA CLEAR        @RESULT(URINEMICROSCOPIC)@    @RESULT(URINECULTURE)@    Radiology       Review of Systems     Review of Systems   Constitutional: Negative for activity change, appetite change, chills, fatigue, fever and unexpected weight change  HENT: Negative for facial swelling  Eyes: Negative for discharge  Respiratory: Negative  Negative for cough and shortness of breath  Cardiovascular: Negative for chest pain and leg swelling  Gastrointestinal: Positive for diarrhea  Negative for abdominal distention, abdominal pain, constipation, nausea and vomiting  Endocrine: Negative  Genitourinary: Negative  Negative for decreased urine volume, difficulty urinating, dysuria, enuresis, flank pain, frequency, genital sores, hematuria and urgency  Nocturia x 2  Vaginal spotting   Musculoskeletal: Negative for back pain and myalgias  Skin: Negative for pallor and rash  Allergic/Immunologic: Negative  Negative for immunocompromised state  Neurological: Negative for facial asymmetry and speech difficulty  Psychiatric/Behavioral: Negative for agitation and confusion  Allergies     Allergies   Allergen Reactions    Crestor [Rosuvastatin]      Upset stomach and diet       Physical Exam     Physical Exam  Constitutional:       General: She is not in acute distress  Appearance: Normal appearance  She is normal weight  She is not ill-appearing, toxic-appearing or diaphoretic  HENT:      Head: Normocephalic and atraumatic  Eyes:      General: No scleral icterus  Cardiovascular:      Rate and Rhythm: Normal rate  Pulmonary:      Effort: Pulmonary effort is normal  No respiratory distress  Abdominal:      General: Abdomen is flat  There is no distension  Palpations: Abdomen is soft  Tenderness: There is no abdominal tenderness  There is no right CVA tenderness, left CVA tenderness, guarding or rebound  Musculoskeletal:         General: No swelling  Cervical back: Normal range of motion  Right lower leg: No edema  Left lower leg: No edema  Skin:     General: Skin is warm and dry  Coloration: Skin is not jaundiced or pale  Findings: No rash  Neurological:      General: No focal deficit present  Mental Status: She is alert and oriented to person, place, and time  Gait: Gait normal    Psychiatric:         Mood and Affect: Mood normal          Behavior: Behavior normal          Thought Content:  Thought content normal          Judgment: Judgment normal          Vital Signs     Vitals:    02/01/22 1145   BP: 126/70   Pulse: 67   Weight: 55 1 kg (121 lb 6 4 oz)   Height: 5' 1" (1 549 m)       Current Medications       Current Outpatient Medications:     atorvastatin (LIPITOR) 10 mg tablet, TAKE ONE TABLET BY MOUTH EVERY DAY (Patient not taking: Reported on 10/14/2021), Disp: 30 tablet, Rfl: 0    bisacodyl (DULCOLAX) 5 mg EC tablet, Take 4 tablets (20 mg total) by mouth once for 1 dose Take on day prior to colonoscopy (Patient not taking: Reported on 11/11/2020), Disp: 4 tablet, Rfl: 0    cyclobenzaprine (FLEXERIL) 10 mg tablet, Take 1 tablet (10 mg total) by mouth 2 (two) times a day as needed for muscle spasms for up to 10 days (Patient not taking: Reported on 11/17/2021 ), Disp: 20 tablet, Rfl: 0    levonorgestrel (MIRENA) 20 MCG/24HR IUD, 1 each by Intrauterine route once (Patient not taking: Reported on 10/14/2021), Disp: , Rfl:     medroxyPROGESTERone (PROVERA) 10 mg tablet, Take 1 tablet (10 mg total) by mouth daily (Patient not taking: Reported on 10/14/2021), Disp: 30 tablet, Rfl: 11    Melatonin 10 MG TBDP, Take by mouth Only, p r n  (Patient not taking: Reported on 10/14/2021 ), Disp: , Rfl:     Mirabegron ER (Myrbetriq) 50 MG TB24, Take 1 tablet (50 mg total) by mouth daily (Patient not taking: Reported on 10/14/2021), Disp: 30 tablet, Rfl: 6    polyethylene glycol (Golytely) 4000 mL solution, Take 4,000 mL by mouth once for 1 dose (Patient not taking: Reported on 11/11/2020), Disp: 4000 mL, Rfl: 0    zolpidem (AMBIEN) 5 mg tablet, Take one tablet at lights out on night of sleep study (Patient not taking: Reported on 10/14/2021), Disp: 1 tablet, Rfl: 0    Active Problems     Patient Active Problem List   Diagnosis    Monoclonal gammopathy    Iron deficiency anemia, unspecified    Abnormal EKG    Pure hypercholesterolemia    Essential hypertension    GONZALO positive    Low iron    Anemia    Leukopenia    Allergies    Chronic nasal congestion    Chronic diarrhea    Encounter for screening for HIV    Bradycardia    Screening for colon cancer    Insomnia    Immunization not carried out because of patient decision    Disease of pericardium, unspecified    Urinary urgency    Urinary frequency    VU (obstructive sleep apnea)    Restless leg syndrome    Excessive daytime sleepiness    Abnormal urine finding    Encounter for screening mammogram for malignant neoplasm of breast    Enlarged lymph node    Positive GONZALO (antinuclear antibody)    Pain of left thigh    Acute bilateral low back pain without sciatica    Elevated liver function tests    Abnormal CK    Special screening for malignant neoplasms, colon    Chronic left shoulder pain    Vaginal bleeding    Microscopic hematuria       Past Medical History     Past Medical History:   Diagnosis Date    Bradycardia     Depression     Hypotension        Surgical History     Past Surgical History:   Procedure Laterality Date    BREAST RECONSTRUCTION      "breast lift"    HEMORROIDECTOMY      WRIST SURGERY Right        Family History     Family History   Problem Relation Age of Onset    Hypertension Maternal Grandmother     Diabetes Paternal Grandmother     No Known Problems Mother     No Known Problems Father     No Known Problems Sister     No Known Problems Daughter     No Known Problems Maternal Grandfather     No Known Problems Paternal Grandfather     No Known Problems Sister     No Known Problems Son     No Known Problems Son     No Known Problems Son     No Known Problems Son        Social History     Social History     Social History     Tobacco Use   Smoking Status Never Smoker   Smokeless Tobacco Never Used       Past Surgical History:   Procedure Laterality Date    BREAST RECONSTRUCTION      "breast lift"    HEMORROIDECTOMY      WRIST SURGERY Right          The following portions of the patient's history were reviewed and updated as appropriate: allergies, current medications, past family history, past medical history, past social history, past surgical history and problem list    Please note :  Voice dictation software has been used to create this document  There may be inadvertent transcription errors      [id number] [id number] Emma Obando

## 2022-02-08 ENCOUNTER — OFFICE VISIT (OUTPATIENT)
Dept: PHYSICAL THERAPY | Facility: CLINIC | Age: 52
End: 2022-02-08
Payer: COMMERCIAL

## 2022-02-08 DIAGNOSIS — M54.50 ACUTE BILATERAL LOW BACK PAIN, UNSPECIFIED WHETHER SCIATICA PRESENT: ICD-10-CM

## 2022-02-08 DIAGNOSIS — M79.652 PAIN OF LEFT THIGH: ICD-10-CM

## 2022-02-08 DIAGNOSIS — M54.50 ACUTE BILATERAL LOW BACK PAIN WITHOUT SCIATICA: Primary | ICD-10-CM

## 2022-02-08 PROCEDURE — 97140 MANUAL THERAPY 1/> REGIONS: CPT | Performed by: PHYSICAL THERAPIST

## 2022-02-08 PROCEDURE — 97112 NEUROMUSCULAR REEDUCATION: CPT | Performed by: PHYSICAL THERAPIST

## 2022-02-08 PROCEDURE — 97110 THERAPEUTIC EXERCISES: CPT | Performed by: PHYSICAL THERAPIST

## 2022-02-08 NOTE — PROGRESS NOTES
Daily Note     Today's date: 2022  Patient name: Umu Nichole  : 1970  MRN: [id number]  Referring provider: Jim Garcia MD  Dx:   Encounter Diagnosis     ICD-10-CM    1  Acute bilateral low back pain without sciatica  M54 50    2  Pain of left thigh  M79 652    3  Acute bilateral low back pain, unspecified whether sciatica present  M54 50        Start Time: 1705        Subjective: Patient reports that she is 80% improved  Objective: See treatment diary below     Precautions: none     Manuals 12/21 12/28 1/4 1/6 1/10 1/18 1/25 2/8    L glute STM                        L coccyx FMT Prone knee flexion with L coccyx, s/l L hip flexed w/ posterior mob resistance, & hip ER/IR Prone knee flexion with L coccyx, s/l L hip flexed w/ posterior mob resistance, & hip ER/IR Prone knee flexion with L coccyx, s/l L hip flexed w/ posterior mob resistance, & hip ER/IR Prone knee flexion with L coccyx, s/l L hip flexed w/ posterior mob resistance, & hip ER/IR Prone knee flexion with L coccyx, s/l L hip flexed w/ posterior mob resistance, & hip ER/IR Prone knee flexion with L coccyx, s/l L hip flexed w/ posterior mob resistance, & hip ER/IR Prone knee flexion with L coccyx 2x10,  s/l L hip flexed w/ posterior mob resistance, & hip ER/IR Prone knee flexion with L coccyx 2x10,  s/l L hip flexed w/ posterior mob resistance, & hip ER/IR                            Ther Ex       UBE/bike w/u Treadmill 5 min Bike 5' Treadmill 5 min Recumbent bike 5 min Treadmill 5 min  Treadmill 5 min TM 5' Treadmill 5 min    Resisted hip  Depression * 3x10  3x10 3x10  3x10 3x12 3x12 3x12 3x12    S/l ext rotation neural stretch * 10x 10" holds  10"x10 10x 10" holds 10x 10" holds  10x 10" holds  10x 10" holds  10"x10 10x 10" holds     Seated hip ER/IR            Seated self L coccyx mob *                        Prone press ups w/ exhale      10x  10x 10x                     Assessment: Tolerated treatment well   Patient demonstrated fatigue post treatment, exhibited good technique with therapeutic exercises and would benefit from continued PT    Plan: Continue per plan of care  Progress treatment as tolerated      Warner Marte, PT 140

## 2022-02-22 ENCOUNTER — OFFICE VISIT (OUTPATIENT)
Dept: PHYSICAL THERAPY | Facility: CLINIC | Age: 52
End: 2022-02-22
Payer: COMMERCIAL

## 2022-02-22 DIAGNOSIS — M79.652 PAIN OF LEFT THIGH: Primary | ICD-10-CM

## 2022-02-22 DIAGNOSIS — M54.50 ACUTE BILATERAL LOW BACK PAIN WITHOUT SCIATICA: ICD-10-CM

## 2022-02-22 DIAGNOSIS — M54.50 ACUTE BILATERAL LOW BACK PAIN, UNSPECIFIED WHETHER SCIATICA PRESENT: ICD-10-CM

## 2022-02-22 PROCEDURE — 97110 THERAPEUTIC EXERCISES: CPT

## 2022-02-22 PROCEDURE — 97140 MANUAL THERAPY 1/> REGIONS: CPT

## 2022-02-22 NOTE — PROGRESS NOTES
Daily Note     Today's date: 2022  Patient name: Maria Luisa Pereira  : 1970  MRN: [id number]  Referring provider: Rashmi Copeland MD  Dx:   Encounter Diagnosis     ICD-10-CM    1  Pain of left thigh  M79 652    2  Acute bilateral low back pain, unspecified whether sciatica present  M54 50    3  Acute bilateral low back pain without sciatica  M54 50                   Subjective: Pt denies pain      Objective: See treatment diary below     Precautions: none     Manuals 12/21 12/28 1/4 1/6 1/10 1/18 1/25 2/8 2/22   L glute STM                        L coccyx FMT Prone knee flexion with L coccyx, s/l L hip flexed w/ posterior mob resistance, & hip ER/IR Prone knee flexion with L coccyx, s/l L hip flexed w/ posterior mob resistance, & hip ER/IR Prone knee flexion with L coccyx, s/l L hip flexed w/ posterior mob resistance, & hip ER/IR Prone knee flexion with L coccyx, s/l L hip flexed w/ posterior mob resistance, & hip ER/IR Prone knee flexion with L coccyx, s/l L hip flexed w/ posterior mob resistance, & hip ER/IR Prone knee flexion with L coccyx, s/l L hip flexed w/ posterior mob resistance, & hip ER/IR Prone knee flexion with L coccyx 2x10,  s/l L hip flexed w/ posterior mob resistance, & hip ER/IR Prone knee flexion with L coccyx 2x10,  s/l L hip flexed w/ posterior mob resistance, & hip ER/IR Prone knee flexion with L coccyx 2x10,  s/l L hip flexed w/ posterior mob resistance, & hip ER/IR                           Ther Ex     UBE/bike w/u Treadmill 5 min Bike 5' Treadmill 5 min Recumbent bike 5' Treadmill 5 min  Treadmill 5 min TM 5' Treadmill 5 min TM 5'   Resisted hip  Depression * 3x10  3x10 3x10  3x10 3x12 3x12 3x12 3x12 3x12   S/l ext rotation neural stretch * 10x 10" holds  10"x10 10x 10" holds 10x 10" holds  10x 10" holds  10x 10" holds  10"x10 10x 10" holds  10"x10   Seated hip ER/IR            Seated self L coccyx mob *                        Prone press ups w/ exhale      10x  10x 10x  15x                   Assessment: Tolerated treatment well  Patient demonstrated fatigue post treatment and exhibited good technique with therapeutic exercises    Plan: D/C to AN Mims, PTA

## 2022-03-07 NOTE — PROGRESS NOTES
Assessment/Plan:     No problem-specific Assessment & Plan notes found for this encounter  Diagnoses and all orders for this visit:    Encounter for gynecological examination (general) (routine) without abnormal findings    Cervical cancer screening  -     Liquid-based pap, screening    Possible exposure to STD  -     Chlamydia/GC amplified DNA by PCR  -     HIV 1/2 ANTIGEN/ANTIBODY (4TH GENERATION) W REFLEX [facility]; Future  -     RPR; Future  -     Hepatitis C antibody; Future    IUD (intrauterine device) in place    Vaginal odor  -     metroNIDAZOLE (FLAGYL) 500 mg tablet; Take 1 tablet (500 mg total) by mouth every 12 (twelve) hours for 7 days    Encounter for screening for malignant neoplasm of breast, unspecified screening modality  -     Mammo screening bilateral w 3d & cad; Future      Depression Screening Follow-up Plan: Patient's depression screening was positive with a PHQ-2 score of 0  Their PHQ-9 score was 2  Clinically patient does not have depression  No treatment is required  Subjective:      Patient ID: Monica Xie is a 46 y o  female who presents for annual exam   Her last pap was 07/01/19 and was ASCUS with negative HPV with endometrial cells present  She had an EMB which was benign  Her last mammogram was in 2019  Mammogram is ordered  She is with the same partner  She requests STD testing  She knows she is due for colonoscopy  HPI    The following portions of the patient's history were reviewed and updated as appropriate: allergies, current medications, past family history, past medical history, past social history, past surgical history and problem list     Review of Systems      Objective:      /81   Pulse 56   Wt 58 8 kg (129 lb 9 6 oz)   BMI 24 49 kg/m²          Physical Exam  Vitals and nursing note reviewed  Exam conducted with a chaperone present  Constitutional:       General: She is not in acute distress  Appearance: She is well-developed  HENT:      Head: Normocephalic  Neck:      Thyroid: No thyromegaly  Cardiovascular:      Rate and Rhythm: Normal rate and regular rhythm  Heart sounds: Normal heart sounds  No murmur heard  Pulmonary:      Effort: Pulmonary effort is normal  No respiratory distress  Breath sounds: Normal breath sounds  No wheezing or rales  Chest:      Chest wall: No tenderness  Abdominal:      General: Bowel sounds are normal  There is no distension  Palpations: Abdomen is soft  There is no mass  Tenderness: There is no abdominal tenderness  There is no guarding or rebound  Genitourinary:     Labia:         Right: No rash, tenderness, lesion or injury  Left: No rash, tenderness, lesion or injury  Vagina: Normal       Cervix: Normal       Uterus: Normal        Adnexa:         Right: No mass, tenderness or fullness  Left: No mass, tenderness or fullness  Rectum: No external hemorrhoid  Comments: IUD strings in place  Skin:     General: Skin is warm and dry  Neurological:      Mental Status: She is alert and oriented to person, place, and time  Psychiatric:         Behavior: Behavior normal          Thought Content:  Thought content normal          Judgment: Judgment normal

## 2022-03-08 ENCOUNTER — ANNUAL EXAM (OUTPATIENT)
Dept: OBGYN CLINIC | Facility: CLINIC | Age: 52
End: 2022-03-08

## 2022-03-08 ENCOUNTER — APPOINTMENT (OUTPATIENT)
Dept: LAB | Facility: CLINIC | Age: 52
End: 2022-03-08
Payer: COMMERCIAL

## 2022-03-08 VITALS
SYSTOLIC BLOOD PRESSURE: 134 MMHG | DIASTOLIC BLOOD PRESSURE: 81 MMHG | BODY MASS INDEX: 24.49 KG/M2 | HEART RATE: 56 BPM | WEIGHT: 129.6 LBS

## 2022-03-08 DIAGNOSIS — Z20.2 POSSIBLE EXPOSURE TO STD: ICD-10-CM

## 2022-03-08 DIAGNOSIS — Z12.39 ENCOUNTER FOR SCREENING FOR MALIGNANT NEOPLASM OF BREAST, UNSPECIFIED SCREENING MODALITY: ICD-10-CM

## 2022-03-08 DIAGNOSIS — Z12.4 CERVICAL CANCER SCREENING: ICD-10-CM

## 2022-03-08 DIAGNOSIS — Z97.5 IUD (INTRAUTERINE DEVICE) IN PLACE: ICD-10-CM

## 2022-03-08 DIAGNOSIS — N89.8 VAGINAL ODOR: ICD-10-CM

## 2022-03-08 DIAGNOSIS — Z01.419 ENCOUNTER FOR GYNECOLOGICAL EXAMINATION (GENERAL) (ROUTINE) WITHOUT ABNORMAL FINDINGS: Primary | ICD-10-CM

## 2022-03-08 LAB — HCV AB SER QL: NORMAL

## 2022-03-08 PROCEDURE — 87591 N.GONORRHOEAE DNA AMP PROB: CPT | Performed by: OBSTETRICS & GYNECOLOGY

## 2022-03-08 PROCEDURE — 99396 PREV VISIT EST AGE 40-64: CPT | Performed by: OBSTETRICS & GYNECOLOGY

## 2022-03-08 PROCEDURE — 36415 COLL VENOUS BLD VENIPUNCTURE: CPT

## 2022-03-08 PROCEDURE — G0476 HPV COMBO ASSAY CA SCREEN: HCPCS | Performed by: OBSTETRICS & GYNECOLOGY

## 2022-03-08 PROCEDURE — 87389 HIV-1 AG W/HIV-1&-2 AB AG IA: CPT

## 2022-03-08 PROCEDURE — 86592 SYPHILIS TEST NON-TREP QUAL: CPT

## 2022-03-08 PROCEDURE — 87491 CHLMYD TRACH DNA AMP PROBE: CPT | Performed by: OBSTETRICS & GYNECOLOGY

## 2022-03-08 PROCEDURE — G0143 SCR C/V CYTO,THINLAYER,RESCR: HCPCS | Performed by: OBSTETRICS & GYNECOLOGY

## 2022-03-08 PROCEDURE — 86803 HEPATITIS C AB TEST: CPT

## 2022-03-08 RX ORDER — METRONIDAZOLE 500 MG/1
500 TABLET ORAL EVERY 12 HOURS SCHEDULED
Qty: 14 TABLET | Refills: 0 | Status: SHIPPED | OUTPATIENT
Start: 2022-03-08 | End: 2022-03-15

## 2022-03-09 LAB
HPV HR 12 DNA CVX QL NAA+PROBE: NEGATIVE
HPV16 DNA CVX QL NAA+PROBE: NEGATIVE
HPV18 DNA CVX QL NAA+PROBE: NEGATIVE
RPR SER QL: NORMAL

## 2022-03-10 LAB
C TRACH DNA SPEC QL NAA+PROBE: NEGATIVE
HIV 1+2 AB+HIV1 P24 AG SERPL QL IA: REACTIVE
N GONORRHOEA DNA SPEC QL NAA+PROBE: NEGATIVE

## 2022-03-11 ENCOUNTER — TELEPHONE (OUTPATIENT)
Dept: OBGYN CLINIC | Facility: CLINIC | Age: 52
End: 2022-03-11

## 2022-03-11 NOTE — TELEPHONE ENCOUNTER
Called to inform pt HIV Ab is positive and confirmatory testing is ordered  Will call when resulted  Other results reviewed and are negative

## 2022-03-13 LAB
LAB AP GYN PRIMARY INTERPRETATION: NORMAL
Lab: NORMAL

## 2022-03-18 ENCOUNTER — APPOINTMENT (OUTPATIENT)
Dept: LAB | Facility: HOSPITAL | Age: 52
End: 2022-03-18
Payer: COMMERCIAL

## 2022-03-18 DIAGNOSIS — Z20.2 POSSIBLE EXPOSURE TO STD: ICD-10-CM

## 2022-03-18 LAB
BACTERIA UR QL AUTO: ABNORMAL /HPF
BILIRUB UR QL STRIP: NEGATIVE
CLARITY UR: CLEAR
COLOR UR: YELLOW
GLUCOSE UR STRIP-MCNC: NEGATIVE MG/DL
HGB UR QL STRIP.AUTO: 25
KETONES UR STRIP-MCNC: ABNORMAL MG/DL
LEUKOCYTE ESTERASE UR QL STRIP: NEGATIVE
NITRITE UR QL STRIP: NEGATIVE
NON-SQ EPI CELLS URNS QL MICRO: ABNORMAL /HPF
PH UR STRIP.AUTO: 6 [PH]
PROT UR STRIP-MCNC: NEGATIVE MG/DL
RBC #/AREA URNS AUTO: ABNORMAL /HPF
SP GR UR STRIP.AUTO: 1.02 (ref 1–1.04)
UROBILINOGEN UA: NEGATIVE MG/DL
WBC #/AREA URNS AUTO: ABNORMAL /HPF

## 2022-03-18 PROCEDURE — 86701 HIV-1ANTIBODY: CPT

## 2022-03-18 PROCEDURE — 87538 HIV-2 PROBE&REVRSE TRNSCRIPJ: CPT

## 2022-03-18 PROCEDURE — 86702 HIV-2 ANTIBODY: CPT

## 2022-03-18 PROCEDURE — 81001 URINALYSIS AUTO W/SCOPE: CPT | Performed by: NURSE PRACTITIONER

## 2022-03-18 PROCEDURE — 36415 COLL VENOUS BLD VENIPUNCTURE: CPT

## 2022-03-18 PROCEDURE — 87535 HIV-1 PROBE&REVERSE TRNSCRPJ: CPT

## 2022-03-21 ENCOUNTER — TELEPHONE (OUTPATIENT)
Dept: OBGYN CLINIC | Facility: CLINIC | Age: 52
End: 2022-03-21

## 2022-03-21 LAB
HGB FRACT BLD-IMP: NEGATIVE
HIV 2 RNA SERPL QL NAA+PROBE: NON REACTIVE
HIV IA ALGORITHM INTERP SERPLBLD-IMP: NORMAL
HIV1 AB SERPL QL IA: NON REACTIVE
HIV1 RNA SERPL QL NAA+PROBE: NON REACTIVE
SL AMB HIV 2 AB: NON REACTIVE

## 2022-04-06 ENCOUNTER — VBI (OUTPATIENT)
Dept: ADMINISTRATIVE | Facility: OTHER | Age: 52
End: 2022-04-06

## 2022-07-12 ENCOUNTER — OFFICE VISIT (OUTPATIENT)
Dept: FAMILY MEDICINE CLINIC | Facility: CLINIC | Age: 52
End: 2022-07-12
Payer: COMMERCIAL

## 2022-07-12 VITALS
TEMPERATURE: 97 F | HEART RATE: 57 BPM | DIASTOLIC BLOOD PRESSURE: 78 MMHG | HEIGHT: 61 IN | SYSTOLIC BLOOD PRESSURE: 140 MMHG | WEIGHT: 121.4 LBS | OXYGEN SATURATION: 99 % | BODY MASS INDEX: 22.92 KG/M2

## 2022-07-12 DIAGNOSIS — N92.6 MENSTRUAL PERIOD LATE: ICD-10-CM

## 2022-07-12 DIAGNOSIS — D47.2 MONOCLONAL GAMMOPATHY: ICD-10-CM

## 2022-07-12 DIAGNOSIS — N93.9 VAGINAL BLEEDING: ICD-10-CM

## 2022-07-12 DIAGNOSIS — R31.29 MICROSCOPIC HEMATURIA: ICD-10-CM

## 2022-07-12 DIAGNOSIS — E78.00 PURE HYPERCHOLESTEROLEMIA: ICD-10-CM

## 2022-07-12 DIAGNOSIS — Z71.85 IMMUNIZATION COUNSELING: ICD-10-CM

## 2022-07-12 DIAGNOSIS — I10 ESSENTIAL HYPERTENSION: Primary | ICD-10-CM

## 2022-07-12 LAB — SL AMB POCT URINE HCG: NEGATIVE

## 2022-07-12 PROCEDURE — 81025 URINE PREGNANCY TEST: CPT | Performed by: FAMILY MEDICINE

## 2022-07-12 PROCEDURE — 99214 OFFICE O/P EST MOD 30 MIN: CPT | Performed by: FAMILY MEDICINE

## 2022-07-12 RX ORDER — AMLODIPINE BESYLATE 5 MG/1
5 TABLET ORAL DAILY
Qty: 30 TABLET | Refills: 5 | Status: SHIPPED | OUTPATIENT
Start: 2022-07-12

## 2022-07-12 NOTE — PROGRESS NOTES
Assessment/Plan:       No problem-specific Assessment & Plan notes found for this encounter  Diagnoses and all orders for this visit:    Essential hypertension  Comments:  Not well controlled  To follow with the dash diet  Orders:  -     UA (URINE) with reflex to Scope  -     TSH, 3rd generation with Free T4 reflex; Future  -     Comprehensive metabolic panel; Future  -     CBC and differential; Future  -     ECG 12 lead; Future  -     amLODIPine (NORVASC) 5 mg tablet; Take 1 tablet (5 mg total) by mouth daily    Vaginal bleeding  Comments: Following with gyn  Menstrual period late  Comments:  Rule out pregnancy  Orders:  -     POCT urine HCG    Microscopic hematuria  -     UA (URINE) with reflex to Scope    Pure hypercholesterolemia  Comments: To follow with low-fat diet  Orders:  -     Comprehensive metabolic panel; Future  -     Lipid Panel with Direct LDL reflex; Future    Monoclonal gammopathy  Comments:  A advised to follow with Hematology  Orders:  -     UA (URINE) with reflex to Scope  -     Comprehensive metabolic panel; Future  -     CBC and differential; Future    Immunization counseling  Comments:  recommend Covid vaccine , pt declined        Patient Instructions   To follow up with test results      Orders Placed This Encounter   Procedures    UA (URINE) with reflex to Scope    TSH, 3rd generation with Free T4 reflex     Standing Status:   Future     Standing Expiration Date:   7/12/2023    Comprehensive metabolic panel     This is a patient instruction: Patient fasting for 8 hours or longer recommended  Standing Status:   Future     Standing Expiration Date:   7/12/2023    CBC and differential     This is a patient instruction: This test is non-fasting  Please drink two glasses of water morning of bloodwork  Standing Status:   Future     Standing Expiration Date:   7/12/2023    Lipid Panel with Direct LDL reflex     This is a patient instruction:  This test requires patient fasting for 10-12 hours or longer  Drinking of black coffee or black tea is acceptable  Standing Status:   Future     Standing Expiration Date:   7/12/2023    POCT urine HCG    ECG 12 lead     Standing Status:   Future     Standing Expiration Date:   7/12/2023         Subjective:     Patient ID: Anay Wang is a 46 y o  female      HPI  Elevated Blood presure  She was shopping at the [employer], had a what is her blood pressure doing  She checked it and it was 157 over 90  On July 10 142/90 ,   Later that day 160/92 and 152/78  Patient stated she would like to start medication  Admit to regular salt intake  Not high caffeine intake  Microscopic hematuria  Denied hematuria  Monoclonal gammopathy  Not following with Hematology  Vaginal bleeding  She said she did follow with gyn and she was placed on the jocelynn  Her bleeding had improved  She did not have her  For the last 2-3 months she has couple spotting recently    No test done since last office visit     Review of Systems   Constitutional: Negative for chills, diaphoresis and fatigue  HENT: Negative for ear pain, sore throat, trouble swallowing and voice change  Eyes: Negative for visual disturbance  Respiratory: Negative for cough, chest tightness and shortness of breath  Cardiovascular: Negative for chest pain, palpitations and leg swelling  Gastrointestinal: Negative for abdominal pain, blood in stool, constipation, diarrhea and nausea  Endocrine: Negative for polydipsia and polyuria  Genitourinary: Negative for dysuria, flank pain, frequency, hematuria, pelvic pain, urgency and vaginal discharge  Musculoskeletal: Negative for arthralgias, back pain, gait problem, myalgias and neck pain  Skin: Negative for rash  Allergic/Immunologic: Negative for food allergies  Neurological: Negative for dizziness, tremors, seizures, weakness, light-headedness, numbness and headaches  Hematological: Negative for adenopathy  Does not bruise/bleed easily  Psychiatric/Behavioral: Negative for confusion and dysphoric mood  The patient is not nervous/anxious  Objective:     Physical Exam  Nursing note reviewed  Constitutional:       General: She is not in acute distress  Appearance: Normal appearance  She is well-developed  She is not ill-appearing  HENT:      Head: Normocephalic  Eyes:      General: No scleral icterus  Conjunctiva/sclera: Conjunctivae normal       Pupils: Pupils are equal, round, and reactive to light  Neck:      Thyroid: No thyromegaly  Cardiovascular:      Rate and Rhythm: Normal rate and regular rhythm  Heart sounds: Normal heart sounds  Pulmonary:      Effort: Pulmonary effort is normal       Breath sounds: Normal breath sounds  No wheezing  Abdominal:      General: There is no distension  Palpations: Abdomen is soft  There is no mass  Tenderness: There is no abdominal tenderness  There is no guarding or rebound  Hernia: No hernia is present  Musculoskeletal:      Right lower leg: No edema  Left lower leg: No edema  Lymphadenopathy:      Cervical: No cervical adenopathy  Skin:     Coloration: Skin is not jaundiced or pale  Findings: No rash  Neurological:      General: No focal deficit present  Mental Status: She is alert and oriented to person, place, and time  Cranial Nerves: No cranial nerve deficit  Motor: No abnormal muscle tone  Coordination: Coordination normal    Psychiatric:         Behavior: Behavior normal          Thought Content:  Thought content normal          Judgment: Judgment normal

## 2022-07-13 ENCOUNTER — LAB (OUTPATIENT)
Dept: LAB | Facility: HOSPITAL | Age: 52
End: 2022-07-13
Attending: FAMILY MEDICINE
Payer: COMMERCIAL

## 2022-07-13 DIAGNOSIS — I10 ESSENTIAL HYPERTENSION: ICD-10-CM

## 2022-07-13 DIAGNOSIS — D47.2 MONOCLONAL GAMMOPATHY: ICD-10-CM

## 2022-07-13 DIAGNOSIS — E78.00 PURE HYPERCHOLESTEROLEMIA: ICD-10-CM

## 2022-07-13 LAB
ALBUMIN SERPL BCP-MCNC: 4.5 G/DL (ref 3–5.2)
ALP SERPL-CCNC: 81 U/L (ref 43–122)
ALT SERPL W P-5'-P-CCNC: 28 U/L
ANION GAP SERPL CALCULATED.3IONS-SCNC: 9 MMOL/L (ref 5–14)
AST SERPL W P-5'-P-CCNC: 35 U/L (ref 14–36)
ATRIAL RATE: 45 BPM
BASOPHILS # BLD AUTO: 0.03 THOUSANDS/ΜL (ref 0–0.1)
BASOPHILS NFR BLD AUTO: 1 % (ref 0–1)
BILIRUB SERPL-MCNC: 0.4 MG/DL
BILIRUB UR QL STRIP: NEGATIVE
BUN SERPL-MCNC: 16 MG/DL (ref 5–25)
CALCIUM SERPL-MCNC: 9.1 MG/DL (ref 8.4–10.2)
CHLORIDE SERPL-SCNC: 106 MMOL/L (ref 97–108)
CHOLEST SERPL-MCNC: 226 MG/DL
CLARITY UR: CLEAR
CO2 SERPL-SCNC: 27 MMOL/L (ref 22–30)
COLOR UR: YELLOW
CREAT SERPL-MCNC: 0.79 MG/DL (ref 0.6–1.2)
EOSINOPHIL # BLD AUTO: 0.11 THOUSAND/ΜL (ref 0–0.61)
EOSINOPHIL NFR BLD AUTO: 3 % (ref 0–6)
ERYTHROCYTE [DISTWIDTH] IN BLOOD BY AUTOMATED COUNT: 12.7 % (ref 11.6–15.1)
GFR SERPL CREATININE-BSD FRML MDRD: 86 ML/MIN/1.73SQ M
GLUCOSE P FAST SERPL-MCNC: 95 MG/DL (ref 70–99)
GLUCOSE UR STRIP-MCNC: NEGATIVE MG/DL
HCT VFR BLD AUTO: 40.3 % (ref 34.8–46.1)
HDLC SERPL-MCNC: 68 MG/DL
HGB BLD-MCNC: 13.2 G/DL (ref 11.5–15.4)
HGB UR QL STRIP.AUTO: NEGATIVE
IMM GRANULOCYTES # BLD AUTO: 0 THOUSAND/UL (ref 0–0.2)
IMM GRANULOCYTES NFR BLD AUTO: 0 % (ref 0–2)
KETONES UR STRIP-MCNC: NEGATIVE MG/DL
LDLC SERPL CALC-MCNC: 146 MG/DL
LEUKOCYTE ESTERASE UR QL STRIP: NEGATIVE
LYMPHOCYTES # BLD AUTO: 1.95 THOUSANDS/ΜL (ref 0.6–4.47)
LYMPHOCYTES NFR BLD AUTO: 45 % (ref 14–44)
MCH RBC QN AUTO: 30.9 PG (ref 26.8–34.3)
MCHC RBC AUTO-ENTMCNC: 32.8 G/DL (ref 31.4–37.4)
MCV RBC AUTO: 94 FL (ref 82–98)
MONOCYTES # BLD AUTO: 0.41 THOUSAND/ΜL (ref 0.17–1.22)
MONOCYTES NFR BLD AUTO: 10 % (ref 4–12)
NEUTROPHILS # BLD AUTO: 1.76 THOUSANDS/ΜL (ref 1.85–7.62)
NEUTS SEG NFR BLD AUTO: 41 % (ref 43–75)
NITRITE UR QL STRIP: NEGATIVE
NRBC BLD AUTO-RTO: 0 /100 WBCS
P AXIS: 35 DEGREES
PH UR STRIP.AUTO: 6 [PH]
PLATELET # BLD AUTO: 183 THOUSANDS/UL (ref 149–390)
PMV BLD AUTO: 10.7 FL (ref 8.9–12.7)
POTASSIUM SERPL-SCNC: 4 MMOL/L (ref 3.6–5)
PR INTERVAL: 158 MS
PROT SERPL-MCNC: 8.2 G/DL (ref 5.9–8.4)
PROT UR STRIP-MCNC: NEGATIVE MG/DL
QRS AXIS: 68 DEGREES
QRSD INTERVAL: 78 MS
QT INTERVAL: 450 MS
QTC INTERVAL: 389 MS
RBC # BLD AUTO: 4.27 MILLION/UL (ref 3.81–5.12)
SODIUM SERPL-SCNC: 142 MMOL/L (ref 137–147)
SP GR UR STRIP.AUTO: 1.02 (ref 1–1.04)
T WAVE AXIS: 40 DEGREES
T4 FREE SERPL-MCNC: 0.92 NG/DL (ref 0.76–1.46)
TRIGL SERPL-MCNC: 60 MG/DL
TSH SERPL DL<=0.05 MIU/L-ACNC: 0.36 UIU/ML (ref 0.45–4.5)
UROBILINOGEN UA: NEGATIVE MG/DL
VENTRICULAR RATE: 45 BPM
WBC # BLD AUTO: 4.26 THOUSAND/UL (ref 4.31–10.16)

## 2022-07-13 PROCEDURE — 93005 ELECTROCARDIOGRAM TRACING: CPT

## 2022-07-13 PROCEDURE — 84439 ASSAY OF FREE THYROXINE: CPT

## 2022-07-13 PROCEDURE — 84443 ASSAY THYROID STIM HORMONE: CPT

## 2022-07-13 PROCEDURE — 93010 ELECTROCARDIOGRAM REPORT: CPT | Performed by: INTERNAL MEDICINE

## 2022-07-13 PROCEDURE — 80061 LIPID PANEL: CPT

## 2022-07-13 PROCEDURE — 85025 COMPLETE CBC W/AUTO DIFF WBC: CPT

## 2022-07-13 PROCEDURE — 36415 COLL VENOUS BLD VENIPUNCTURE: CPT

## 2022-07-13 PROCEDURE — 80053 COMPREHEN METABOLIC PANEL: CPT

## 2022-08-01 ENCOUNTER — OFFICE VISIT (OUTPATIENT)
Dept: FAMILY MEDICINE CLINIC | Facility: CLINIC | Age: 52
End: 2022-08-01
Payer: COMMERCIAL

## 2022-08-01 VITALS
DIASTOLIC BLOOD PRESSURE: 80 MMHG | WEIGHT: 125.4 LBS | SYSTOLIC BLOOD PRESSURE: 130 MMHG | OXYGEN SATURATION: 100 % | HEART RATE: 52 BPM | HEIGHT: 61 IN | TEMPERATURE: 96.9 F | BODY MASS INDEX: 23.68 KG/M2

## 2022-08-01 DIAGNOSIS — L84 CORN: ICD-10-CM

## 2022-08-01 DIAGNOSIS — M21.619 BUNION OF GREAT TOE: ICD-10-CM

## 2022-08-01 DIAGNOSIS — D47.2 MONOCLONAL GAMMOPATHY: ICD-10-CM

## 2022-08-01 DIAGNOSIS — R79.89 LOW THYROID STIMULATING HORMONE (TSH) LEVEL: Primary | ICD-10-CM

## 2022-08-01 DIAGNOSIS — I35.1 NONRHEUMATIC AORTIC VALVE INSUFFICIENCY: ICD-10-CM

## 2022-08-01 DIAGNOSIS — E78.00 PURE HYPERCHOLESTEROLEMIA: ICD-10-CM

## 2022-08-01 DIAGNOSIS — D72.819 LEUKOPENIA, UNSPECIFIED TYPE: ICD-10-CM

## 2022-08-01 DIAGNOSIS — R00.1 BRADYCARDIA: ICD-10-CM

## 2022-08-01 DIAGNOSIS — N28.9 ABNORMAL RENAL FUNCTION: ICD-10-CM

## 2022-08-01 DIAGNOSIS — R31.29 MICROSCOPIC HEMATURIA: ICD-10-CM

## 2022-08-01 DIAGNOSIS — M20.41 HAMMER TOE OF RIGHT FOOT: ICD-10-CM

## 2022-08-01 PROCEDURE — 99214 OFFICE O/P EST MOD 30 MIN: CPT | Performed by: FAMILY MEDICINE

## 2022-08-01 NOTE — PROGRESS NOTES
Assessment/Plan:       No problem-specific Assessment & Plan notes found for this encounter  Diagnoses and all orders for this visit:    Low thyroid stimulating hormone (TSH) level  -     T3; Future    Abnormal renal function  Comments:  Increased  water intake  Orders:  -     Basic metabolic panel; Future    Microscopic hematuria  Comments:  recent ua is  neg   to follow with urology    Pure hypercholesterolemia  Comments:   recommend medication, the patient  is not interested, to follow with low cholesterol diet  recheck cholesterol a few months    Monoclonal gammopathy  -     Ambulatory Referral to Hematology / Oncology; Future    Leukopenia, unspecified type  Comments:   increase relative lymphocytes  Orders:  -     Ambulatory Referral to Hematology / Oncology; Future    Bunion of great toe  Comments:  left great toe  Orders:  -     Ambulatory Referral to Podiatry; Future    Corn  Comments:  right litte finger  Orders:  -     Ambulatory Referral to Podiatry; Future    Hammer toe of right foot  -     Ambulatory Referral to Podiatry; Future    Nonrheumatic aortic valve insufficiency  -     Echo complete w/ contrast if indicated; Future    Bradycardia  Comments:  Stable, patient is asymptomatic  Patient Instructions   To follow with  test results      Orders Placed This Encounter   Procedures    Basic metabolic panel     This is a patient instruction: Patient fasting for 8 hours or longer recommended       Standing Status:   Future     Standing Expiration Date:   8/1/2023    T3     Standing Status:   Future     Standing Expiration Date:   8/1/2023    Ambulatory Referral to Podiatry     Standing Status:   Future     Standing Expiration Date:   8/1/2023     Referral Priority:   Routine     Referral Type:   Consult - AMB     Referral Reason:   Specialty Services Required     Referred to Provider:   Dylon Camarillo DPM     Requested Specialty:   Podiatry     Number of Visits Requested:   1 Expiration Date:   8/1/2023    Ambulatory Referral to Hematology / Oncology     Standing Status:   Future     Standing Expiration Date:   8/1/2023     Referral Priority:   Routine     Referral Type:   Consult - AMB     Referral Reason:   Specialty Services Required     Referred to Provider:   John Pop MD     Requested Specialty:   Hematology and Oncology     Number of Visits Requested:   1     Expiration Date:   8/1/2023    Echo complete w/ contrast if indicated     Standing Status:   Future     Standing Expiration Date:   8/1/2026     Scheduling Instructions: There is no prep required  Please bring your insurance cards, a form of photo ID and a list of your medications with you  Arrive 15 minutes prior to your appointment time in order to register  To schedule this appointment, please contact Central Scheduling at [phone]  Subjective:     Patient ID: Gonzalez Monahan is a 46 y o  female      HPI  HTN ,didn't take Norvasc last night, also on  He rway to the office was stuck in traffic , so she was little stressed  Denied headache or dizziness  Admit to regular salt intake  Microscopic hematuria , denied hematuria   hyperlipidemia  She said her diet usually is regular  Sometimes she eats junk food  monoclonal gammopathy   didn't follow with Hematology   Bradycardia  Denied lighted headedness was evaluated in the past by Cardiology was told no need for follow-up   new complaints  patient stated she has   Bunion of the 1st metatarsal pharyngeal joint of left foot   Complaining of pain of the joint with walking and wearing shoes  For long time  s   also she has a corn on the right little toe for long time she apply a corn remover comes back multiple time    She is requesting referral to Podiatry     test results  EKG  Labs 7-13-22  Discussed result with patient  Review of Systems   Constitutional: Negative for activity change, appetite change, chills, fatigue, fever and unexpected weight change  HENT: Negative for congestion, ear discharge, ear pain, hearing loss, nosebleeds, rhinorrhea, sinus pressure, sore throat, tinnitus, trouble swallowing and voice change  Eyes: Negative for photophobia, pain and visual disturbance  Respiratory: Negative for cough, chest tightness, shortness of breath and wheezing  Cardiovascular: Negative for chest pain, palpitations and leg swelling  Gastrointestinal: Negative for abdominal pain, anal bleeding, blood in stool, constipation, diarrhea, nausea and vomiting  Endocrine: Negative for cold intolerance, heat intolerance, polydipsia and polyuria  Genitourinary: Negative for difficulty urinating, dysuria, frequency, hematuria, urgency, vaginal bleeding, vaginal discharge and vaginal pain  Musculoskeletal: Negative for back pain, gait problem, joint swelling, myalgias and neck pain  Skin: Negative for rash  Neurological: Negative for dizziness, tremors, seizures, syncope, facial asymmetry, weakness, light-headedness and headaches  Hematological: Negative for adenopathy  Does not bruise/bleed easily  Psychiatric/Behavioral: Negative for agitation, behavioral problems, confusion, dysphoric mood, hallucinations and sleep disturbance  The patient is not nervous/anxious  Objective:     Physical Exam  Constitutional:       General: She is not in acute distress  Appearance: Normal appearance  She is well-developed  She is not ill-appearing or diaphoretic  HENT:      Head: Normocephalic and atraumatic  Eyes:      General: No scleral icterus  Right eye: No discharge  Left eye: No discharge  Extraocular Movements: Extraocular movements intact  Conjunctiva/sclera: Conjunctivae normal       Pupils: Pupils are equal, round, and reactive to light  Neck:      Thyroid: No thyromegaly  Vascular: No JVD  Cardiovascular:      Rate and Rhythm: Normal rate and regular rhythm        Pulses: Carotid pulses are 3+ on the right side and 3+ on the left side  Heart sounds: Normal heart sounds  No murmur heard  Comments: Extremities  No edema  Pulmonary:      Effort: Pulmonary effort is normal       Breath sounds: Normal breath sounds  Abdominal:      General: Bowel sounds are normal  There is no distension  Palpations: Abdomen is soft  There is no mass  Tenderness: There is no abdominal tenderness  There is no guarding or rebound  Hernia: No hernia is present  Musculoskeletal:      Cervical back: Neck supple  Right lower leg: No edema  Left lower leg: No edema  Comments: Moderate bony enlargement of the 1st metatarsal pharyngeal joint of the left foot  Also there is moderate hammertoe of the right little toe with a call on the top   Lymphadenopathy:      Cervical: No cervical adenopathy  Skin:     Coloration: Skin is not pale  Findings: No rash  Neurological:      General: No focal deficit present  Mental Status: She is alert and oriented to person, place, and time  Cranial Nerves: No cranial nerve deficit  Sensory: No sensory deficit  Motor: No weakness or abnormal muscle tone  Coordination: Coordination normal       Gait: Gait normal    Psychiatric:         Mood and Affect: Mood normal          Behavior: Behavior normal          Thought Content:  Thought content normal          Judgment: Judgment normal

## 2022-08-02 ENCOUNTER — OFFICE VISIT (OUTPATIENT)
Dept: UROLOGY | Facility: CLINIC | Age: 52
End: 2022-08-02
Payer: COMMERCIAL

## 2022-08-02 VITALS
DIASTOLIC BLOOD PRESSURE: 72 MMHG | SYSTOLIC BLOOD PRESSURE: 122 MMHG | WEIGHT: 125 LBS | BODY MASS INDEX: 23.6 KG/M2 | HEIGHT: 61 IN | HEART RATE: 50 BPM

## 2022-08-02 DIAGNOSIS — R31.29 MICROSCOPIC HEMATURIA: Primary | ICD-10-CM

## 2022-08-02 DIAGNOSIS — N39.41 URGE INCONTINENCE: ICD-10-CM

## 2022-08-02 PROBLEM — M20.41 HAMMER TOE OF RIGHT FOOT: Status: ACTIVE | Noted: 2022-08-02

## 2022-08-02 PROBLEM — L84 CORN: Status: ACTIVE | Noted: 2022-08-02

## 2022-08-02 PROBLEM — I35.1 NONRHEUMATIC AORTIC VALVE INSUFFICIENCY: Status: ACTIVE | Noted: 2022-08-02

## 2022-08-02 PROBLEM — N28.9 ABNORMAL RENAL FUNCTION: Status: ACTIVE | Noted: 2022-08-02

## 2022-08-02 PROCEDURE — 99213 OFFICE O/P EST LOW 20 MIN: CPT | Performed by: NURSE PRACTITIONER

## 2022-08-02 NOTE — ASSESSMENT & PLAN NOTE
· Referral to pelvic floor physical therapy  · Avoid bladder irritants  · Increased water intake  · Voiding diary  · Consider anticholinergic or Myrbetriq if symptoms fail to improve  · Follow-up 3 months for recheck

## 2022-08-02 NOTE — PROGRESS NOTES
Assessment and plan:     Urge incontinence  · Referral to pelvic floor physical therapy  · Avoid bladder irritants  · Increased water intake  · Voiding diary  · Consider anticholinergic or Myrbetriq if symptoms fail to improve  · Follow-up 3 months for recheck    Microscopic hematuria  · Urine testing with 0-2 RBC/hpf  · No blood noted in urine testing 2 weeks ago  · Follow-up omar r n  WALTER Zavaleta    History of Present Illness     Pta Mondragon is a 46 y o  female presents for six-month follow-up of microscopic hematuria  She has had urine testing on 2 separate occasions that revealed 0-1 and 1-2 RBCs/hpf  Both with occasional epithelial cells and occasional bacteria  Her urine culture did not reveal any growth 10/14/2021  She is urine cytology ordered by her PCP but not completed  She was also ordered an ultrasound of her kidney bladder by her PCP which was not yet completed  She denies any gross hematuria  She reports beomg perimenopausal and has been having vaginal spotting intermittently  Denies smoking history or chemical exposure      She has previously prescribed  Myrbetriq for urinary frequency and urgency, but does not remember taking it  This had improved  She has intermittent episodes of urgency and frequency with urge incontinence  She mostly drinks water:a few glasses to 1/2 gallon, no soda, occasional coffee, no soda, social drinker  She was previously recommended to go to pelvic floor physical therapy  She reports loose bowels and diarrhea daily  She has taken probiotics for this  She had multiple stool tests  She is following with gastroenterology  She did not follow up for her colonoscopy      She did note increased UTI about a year ago after sexual intercourse  This has since resolved      Has IUD placed 2020  Denies /GYN procedures  No hx of kidney stones   Denies family hx of kidney or bladder cancer       Laboratory     Lab Results   Component Value Date    BUN 16 07/13/2022    CREATININE 0 79 07/13/2022       No components found for: GFR    Lab Results   Component Value Date    CALCIUM 9 1 07/13/2022    K 4 0 07/13/2022    CO2 27 07/13/2022     07/13/2022       Lab Results   Component Value Date    WBC 4 26 (L) 07/13/2022    HGB 13 2 07/13/2022    HCT 40 3 07/13/2022    MCV 94 07/13/2022     07/13/2022       No results found for: PSA    No results found for this or any previous visit (from the past 1 hour(s))  @RESULT(URINEMICROSCOPIC)@    @RESULT(URINECULTURE)@    Radiology       Review of Systems     Review of Systems   Constitutional: Negative for activity change, appetite change, chills, fatigue, fever and unexpected weight change  HENT: Negative for facial swelling  Eyes: Negative for discharge  Respiratory: Negative  Negative for cough and shortness of breath  Cardiovascular: Negative for chest pain and leg swelling  Gastrointestinal: Negative  Negative for abdominal distention, abdominal pain, constipation, diarrhea, nausea and vomiting  Endocrine: Negative  Genitourinary: Positive for urgency  Negative for decreased urine volume, difficulty urinating, dysuria, enuresis, flank pain, frequency, genital sores and hematuria  Urge incontinence   Musculoskeletal: Negative for back pain and myalgias  Skin: Negative for pallor and rash  Allergic/Immunologic: Negative  Negative for immunocompromised state  Neurological: Negative for facial asymmetry and speech difficulty  Psychiatric/Behavioral: Negative for agitation and confusion  Urinary Incontinence Screening    Flowsheet Row Most Recent Value   Urinary Incontinence    Urinary Incontinence? Yes   Incomplete emptying? No   Urinary frequency? Yes   Urinary urgency? Yes  [sometimes]   Urinary hesitancy? No   Dysuria (painful difficult urination)? No   Nocturia (waking up to use the bathroom)? Yes  [2-4x]   Straining (having to push to go)?  No   Weak stream? No Intermittent stream? No            Allergies     Allergies   Allergen Reactions    Crestor [Rosuvastatin]      Upset stomach and diet       Physical Exam     Physical Exam  Vitals reviewed  Constitutional:       General: She is not in acute distress  Appearance: Normal appearance  She is normal weight  She is not ill-appearing, toxic-appearing or diaphoretic  HENT:      Head: Normocephalic and atraumatic  Eyes:      General: No scleral icterus  Cardiovascular:      Rate and Rhythm: Normal rate  Pulmonary:      Effort: Pulmonary effort is normal  No respiratory distress  Abdominal:      General: Abdomen is flat  There is no distension  Musculoskeletal:         General: No swelling  Cervical back: Normal range of motion  Right lower leg: No edema  Left lower leg: No edema  Skin:     General: Skin is warm and dry  Coloration: Skin is not jaundiced or pale  Findings: No rash  Neurological:      General: No focal deficit present  Mental Status: She is alert and oriented to person, place, and time  Gait: Gait normal    Psychiatric:         Mood and Affect: Mood normal          Behavior: Behavior normal          Thought Content:  Thought content normal          Judgment: Judgment normal          Vital Signs     Vitals:    08/02/22 1543   BP: 122/72   BP Location: Left arm   Patient Position: Sitting   Cuff Size: Adult   Pulse: (!) 50   Weight: 56 7 kg (125 lb)   Height: 5' 1" (1 549 m)       Current Medications       Current Outpatient Medications:     amLODIPine (NORVASC) 5 mg tablet, Take 1 tablet (5 mg total) by mouth daily, Disp: 30 tablet, Rfl: 5    levonorgestrel (MIRENA) 20 MCG/24HR IUD, 1 each by Intrauterine route once  , Disp: , Rfl:     Active Problems     Patient Active Problem List   Diagnosis    Monoclonal gammopathy    Iron deficiency anemia, unspecified    Abnormal EKG    Pure hypercholesterolemia    Essential hypertension    GONZALO positive    Low iron    Anemia    Leukopenia    Allergies    Chronic nasal congestion    Chronic diarrhea    Encounter for screening for HIV    Bradycardia    Screening for colon cancer    Insomnia    Immunization not carried out because of patient decision    Disease of pericardium, unspecified    Urinary urgency    Urinary frequency    VU (obstructive sleep apnea)    Restless leg syndrome    Excessive daytime sleepiness    Abnormal urine finding    Encounter for screening mammogram for malignant neoplasm of breast    Enlarged lymph node    Positive GONZALO (antinuclear antibody)    Pain of left thigh    Acute bilateral low back pain without sciatica    Elevated liver function tests    Abnormal CK    Special screening for malignant neoplasms, colon    Chronic left shoulder pain    Vaginal bleeding    Microscopic hematuria    Menstrual period late    Immunization counseling    Low thyroid stimulating hormone (TSH) level    Nonrheumatic aortic valve insufficiency    Hammer toe of right foot    Corn    Abnormal renal function    Urge incontinence       Past Medical History     Past Medical History:   Diagnosis Date    Bradycardia     Depression     Hypotension        Surgical History     Past Surgical History:   Procedure Laterality Date    BREAST RECONSTRUCTION      "breast lift" June 2020    HEMORROIDECTOMY      WRIST SURGERY Right        Family History     Family History   Problem Relation Age of Onset    Hypertension Maternal Grandmother     Diabetes Paternal Grandmother     No Known Problems Mother     No Known Problems Father     No Known Problems Sister     No Known Problems Daughter     No Known Problems Maternal Grandfather     No Known Problems Paternal Grandfather     No Known Problems Sister     No Known Problems Son     No Known Problems Son     No Known Problems Son     No Known Problems Son        Social History     Social History     Social History     Tobacco Use   Smoking Status Never Smoker   Smokeless Tobacco Never Used       Past Surgical History:   Procedure Laterality Date    BREAST RECONSTRUCTION      "breast lift" June 2020    HEMORROIDECTOMY      WRIST SURGERY Right          The following portions of the patient's history were reviewed and updated as appropriate: allergies, current medications, past family history, past medical history, past social history, past surgical history and problem list    Please note :  Voice dictation software has been used to create this document  There may be inadvertent transcription errors      [address] [address] [place]

## 2022-08-02 NOTE — PATIENT INSTRUCTIONS
BLADDER HEALTH    WHAT IS CONSIDERED NORMAL? The average bladder can hold about 2 cups of urine before it needs to be emptied  The normal range of voiding urine is 6 to 8 times during a 24 hour period  As we get older, our bladder capacity can get smaller and we may need to pass urine more frequently but usually not more than every 2 hours  Urine should flow easily without discomfort in a good, steady stream until the bladder is empty  No pushing or straining is necessary to empty the bladder  An urge is a signal that you feel as the bladder stretches to fill with urine  Urges can be felt even if the bladder is not full  Urges are not commands to go to the toilet, merely a signal and can be controlled  WHAT ARE GOOD BLADDER HABITS? Take your time when emptying your bladder  Dont strain or push to empty your bladder  Make sure you empty your bladder completely each time you pass urine  Do not rush the process  Consistently ignoring the urge to go (waiting more than 4 hours between toileting) or urinating too infrequently may be convenient but not healthy for your bladder  Avoid going to the toilet just in case or more often than every 2 hours  It is usually not necessary to go when you feel the first urge  Try to go only when your bladder is full  Urgency and frequency of urination can be improved by retraining the bladder and spacing your fluid intake throughout the day  Practice good toilet habits  Dont let your bladder control your life  TIPS TO MAINTAIN GOOD BLADDER HABITS  Maintain a good fluid intake  Depending on your body size and environment, drink 6 -8 cups (8 oz each) of fluid per day unless otherwise advised by your doctor  Not enough fluid creates a foul odor and dark color of the urine  Limit the amount of caffeine (coffee, cola, chocolate or tea) and citrus foods that you consume as these foods can be associated with increased sensation of urinary urgency and frequency  Limit the amount of alcohol you drink  Alcohol increases urine production and makes it difficult for the brain to coordinate bladder control  Avoid constipation by maintaining a balanced diet of dietary fiber  Cigarette smoking is also irritating to the bladder surface and is associated with bladder cancer  In addition, the coughing associated with smoking may lead to increased incontinent episodes because of the increased pressure  HOW DIET CAN AFFECT YOUR BLADDER  Although there is no particular "diet" that can cure bladder control, there are certain dietary suggestions you can use to help control the problem  There are 2 points to consider when evaluating how your habits and diet may affect your bladder:    Foods and fluids can irritate the bladder  Some foods and beverages are thought to contribute to bladder leakage and irritability  However their effect on the bladder is not completely understood and you may want to see if eliminating one or all of these items improves your bladder control  If you are unable to give them up completely, it is recommended that you use the following items in moderation:  Acidic beverages and foods (orange juice, grapefruit juice, lemonade etc)  Alcoholic beverages  Vinegar  Coffee (regular and decaf)  Tea (regular and decaf)  Caffeinated beverages  Carbonated beverages          Drinking enough and the right kinds of fluids  Many people with bladder control issues decrease their intake of liquids in hope that they will need to urinate less frequently or have less urinary leakage  You should not restrict fluids to control your bladder  While a decrease in liquid intake does result in a decrease in the volume of urine, the smaller amount of urine may be more highly concentrated  Highly concentrated, dark yellow urine is irritating to the bladder surface and may actually cause you to go to the bathroom more frequently        It also encourages the growth of bacteria, which may lead to infections resulting in incontinence  Substitutions for Bladder Irritants: water is always the best beverage choice  Grape and apple juice are thirst quenchers are good selections and are not as irritating to the bladder  Low acid fruits:  Pears, apricots, papaya, watermelon  For coffee drinkers: KAVA®, Postum®, Leigh Ann®, Kaffree Shanice®  For tea drinkers:  non-citrus or herbal and sun brewed tea    BEHAVIORAL THERAPY FOR IMPROVED BLADDER CONTROL      1  Urge Control Techniques       A  Stop whatever you are doing and concentrate on john your pelvic floor muscles  B   Contract your pelvic floor muscles repetitively (as in your "flick" exercises)  C   Once the urgency has subsided, realize that sometimes the urgency is because you have a             full bladder and have to urinate  Other times the urgency is a false signal and you do not have a full bladder  D  If you have urgency triggered by running water, "key in the door," getting up from a sitting position, etc , contract your pelvic floor muscles prior to       initiating the activity  2   Daily Fluid Intake       A  Avoid drinking too little (less than 3 cups/day) or drinking too much (more than 6             cups/day)  B   Avoid caffeinated beverages  C   If voiding frequently at night, limit your liquid intake after 7 p m  3   Bowel Regularity--Constipation Makes Bladder Symptoms Worse       A  Drink enough liquids, eat plenty of high fiber food  B  Exercise       C  Avoid laxatives and enemas on a regular basis, this decreases the bowel's normal function  4   Voiding Schedules       A  Empty your bladder at 1½ to 2 hour intervals even if you may not have the urge to urinate             at that time  You may gradually increase the time between voidings to 30  minutes, until you can comfortably void every 3 hours  B    If you are voiding more frequently than every 1½ to 2 hours, resist the urge to go  by using urge control techniques (described in 1 )

## 2022-08-03 ENCOUNTER — TELEPHONE (OUTPATIENT)
Dept: HEMATOLOGY ONCOLOGY | Facility: CLINIC | Age: 52
End: 2022-08-03

## 2022-08-03 ENCOUNTER — HOSPITAL ENCOUNTER (OUTPATIENT)
Dept: ULTRASOUND IMAGING | Facility: HOSPITAL | Age: 52
Discharge: HOME | End: 2022-08-03
Attending: FAMILY MEDICINE
Payer: COMMERCIAL

## 2022-08-03 ENCOUNTER — APPOINTMENT (OUTPATIENT)
Dept: LAB | Facility: HOSPITAL | Age: 52
End: 2022-08-03
Attending: FAMILY MEDICINE
Payer: COMMERCIAL

## 2022-08-03 DIAGNOSIS — D47.2 MONOCLONAL GAMMOPATHY: Primary | ICD-10-CM

## 2022-08-03 DIAGNOSIS — R79.89 LOW THYROID STIMULATING HORMONE (TSH) LEVEL: ICD-10-CM

## 2022-08-03 DIAGNOSIS — N28.9 ABNORMAL RENAL FUNCTION: ICD-10-CM

## 2022-08-03 DIAGNOSIS — N93.9 VAGINAL BLEEDING: ICD-10-CM

## 2022-08-03 DIAGNOSIS — R31.29 MICROSCOPIC HEMATURIA: ICD-10-CM

## 2022-08-03 LAB
ANION GAP SERPL CALCULATED.3IONS-SCNC: 7 MMOL/L (ref 5–14)
BUN SERPL-MCNC: 12 MG/DL (ref 5–25)
CALCIUM SERPL-MCNC: 8.9 MG/DL (ref 8.4–10.2)
CHLORIDE SERPL-SCNC: 106 MMOL/L (ref 96–108)
CO2 SERPL-SCNC: 24 MMOL/L (ref 21–32)
CREAT SERPL-MCNC: 0.75 MG/DL (ref 0.6–1.2)
GFR SERPL CREATININE-BSD FRML MDRD: 92 ML/MIN/1.73SQ M
GLUCOSE P FAST SERPL-MCNC: 100 MG/DL (ref 70–99)
POTASSIUM SERPL-SCNC: 3.8 MMOL/L (ref 3.5–5.3)
SODIUM SERPL-SCNC: 137 MMOL/L (ref 135–147)
T3 SERPL-MCNC: 0.7 NG/ML (ref 0.6–1.8)

## 2022-08-03 PROCEDURE — 80048 BASIC METABOLIC PNL TOTAL CA: CPT

## 2022-08-03 PROCEDURE — 84480 ASSAY TRIIODOTHYRONINE (T3): CPT

## 2022-08-03 PROCEDURE — 36415 COLL VENOUS BLD VENIPUNCTURE: CPT

## 2022-08-03 PROCEDURE — 76830 TRANSVAGINAL US NON-OB: CPT

## 2022-08-03 PROCEDURE — 76856 US EXAM PELVIC COMPLETE: CPT

## 2022-08-03 PROCEDURE — 76770 US EXAM ABDO BACK WALL COMP: CPT

## 2022-08-03 NOTE — TELEPHONE ENCOUNTER
I spoke with the patient in regards to her request for scheduling a follow up  I informed the patient that I can schedule her for September 7th or 9th with Libertad Graves  Patient states she can do 9/9/22  Informed patient I have a 2 pm or 2:30pm appt slot  Patient accepted 9/9/22 at 2:00pm  Informed patient that she is now scheduled and I will be placing lab orders in the system that she will need to have completed prior to her appt  Patient states understanding

## 2022-08-11 ENCOUNTER — TELEPHONE (OUTPATIENT)
Dept: HEMATOLOGY ONCOLOGY | Facility: CLINIC | Age: 52
End: 2022-08-11

## 2022-08-11 NOTE — TELEPHONE ENCOUNTER
Appointment Confirmation (to confirm pre existing appointments - ONLY)  No need to route   Appointment with  Robinson Stephenson   Appointment date & time 9/9/22 @ 2pm   Location [place]   Patient verbilized Understanding yes

## 2022-08-30 ENCOUNTER — EVALUATION (OUTPATIENT)
Dept: PHYSICAL THERAPY | Facility: REHABILITATION | Age: 52
End: 2022-08-30
Payer: COMMERCIAL

## 2022-08-30 DIAGNOSIS — M62.81 MUSCLE WEAKNESS: Primary | ICD-10-CM

## 2022-08-30 DIAGNOSIS — N39.41 URGE INCONTINENCE: ICD-10-CM

## 2022-08-30 PROCEDURE — 97161 PT EVAL LOW COMPLEX 20 MIN: CPT | Performed by: PHYSICAL THERAPIST

## 2022-08-30 PROCEDURE — 97530 THERAPEUTIC ACTIVITIES: CPT | Performed by: PHYSICAL THERAPIST
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
## 2022-08-31 ENCOUNTER — HOSPITAL ENCOUNTER (OUTPATIENT)
Dept: NON INVASIVE DIAGNOSTICS | Facility: HOSPITAL | Age: 52
Discharge: HOME | End: 2022-08-31
Attending: FAMILY MEDICINE
Payer: COMMERCIAL

## 2022-08-31 VITALS
SYSTOLIC BLOOD PRESSURE: 122 MMHG | HEART RATE: 49 BPM | DIASTOLIC BLOOD PRESSURE: 72 MMHG | BODY MASS INDEX: 23.6 KG/M2 | WEIGHT: 125 LBS | HEIGHT: 61 IN

## 2022-08-31 DIAGNOSIS — I35.1 NONRHEUMATIC AORTIC VALVE INSUFFICIENCY: ICD-10-CM

## 2022-08-31 LAB
AORTIC ROOT: 2.7 CM
APICAL FOUR CHAMBER EJECTION FRACTION: 65 %
E WAVE DECELERATION TIME: 172 MS
FRACTIONAL SHORTENING: 35 (ref 28–44)
INTERVENTRICULAR SEPTUM IN DIASTOLE (PARASTERNAL SHORT AXIS VIEW): 0.9 CM
INTERVENTRICULAR SEPTUM: 0.9 CM (ref 0.6–1.1)
IVC: 22 MM
LAAS-AP2: 15.4 CM2
LAAS-AP4: 17 CM2
LEFT ATRIUM AREA SYSTOLE SINGLE PLANE A4C: 17.2 CM2
LEFT ATRIUM SIZE: 3.1 CM
LEFT INTERNAL DIMENSION IN SYSTOLE: 2.8 CM (ref 2.1–4)
LEFT VENTRICULAR INTERNAL DIMENSION IN DIASTOLE: 4.3 CM (ref 3.5–6)
LEFT VENTRICULAR POSTERIOR WALL IN END DIASTOLE: 0.9 CM
LEFT VENTRICULAR STROKE VOLUME: 51 ML
LVSV (TEICH): 51 ML
MV E'TISSUE VEL-SEP: 12 CM/S
MV PEAK A VEL: 0.51 M/S
MV PEAK E VEL: 82 CM/S
MV STENOSIS PRESSURE HALF TIME: 50 MS
MV VALVE AREA P 1/2 METHOD: 4.4
RA PRESSURE ESTIMATED: 10 MMHG
RIGHT ATRIUM AREA SYSTOLE A4C: 14.2 CM2
RIGHT VENTRICLE ID DIMENSION: 2.9 CM
RV PSP: 30 MMHG
SL CV LEFT ATRIUM LENGTH A2C: 4.9 CM
SL CV LV EF: 65
SL CV PED ECHO LEFT VENTRICLE DIASTOLIC VOLUME (MOD BIPLANE) 2D: 81 ML
SL CV PED ECHO LEFT VENTRICLE SYSTOLIC VOLUME (MOD BIPLANE) 2D: 30 ML
TR MAX PG: 20 MMHG
TR PEAK VELOCITY: 2.2 M/S
TRICUSPID VALVE PEAK REGURGITATION VELOCITY: 2.24 M/S

## 2022-08-31 PROCEDURE — 93306 TTE W/DOPPLER COMPLETE: CPT | Performed by: STUDENT IN AN ORGANIZED HEALTH CARE EDUCATION/TRAINING PROGRAM

## 2022-08-31 PROCEDURE — 93306 TTE W/DOPPLER COMPLETE: CPT

## 2022-09-01 ENCOUNTER — OFFICE VISIT (OUTPATIENT)
Dept: FAMILY MEDICINE CLINIC | Facility: CLINIC | Age: 52
End: 2022-09-01
Payer: COMMERCIAL

## 2022-09-01 VITALS
WEIGHT: 120.6 LBS | BODY MASS INDEX: 22.77 KG/M2 | RESPIRATION RATE: 16 BRPM | HEART RATE: 58 BPM | DIASTOLIC BLOOD PRESSURE: 70 MMHG | HEIGHT: 61 IN | OXYGEN SATURATION: 100 % | SYSTOLIC BLOOD PRESSURE: 118 MMHG | TEMPERATURE: 97.1 F

## 2022-09-01 DIAGNOSIS — Z12.31 ENCOUNTER FOR SCREENING MAMMOGRAM FOR MALIGNANT NEOPLASM OF BREAST: ICD-10-CM

## 2022-09-01 DIAGNOSIS — Z12.11 COLON CANCER SCREENING: ICD-10-CM

## 2022-09-01 DIAGNOSIS — Z71.85 IMMUNIZATION COUNSELING: ICD-10-CM

## 2022-09-01 DIAGNOSIS — R79.89 LOW THYROID STIMULATING HORMONE (TSH) LEVEL: ICD-10-CM

## 2022-09-01 DIAGNOSIS — R00.1 BRADYCARDIA: ICD-10-CM

## 2022-09-01 DIAGNOSIS — F41.9 ANXIETY: ICD-10-CM

## 2022-09-01 DIAGNOSIS — E87.6 HYPOKALEMIA: ICD-10-CM

## 2022-09-01 DIAGNOSIS — D47.2 MONOCLONAL GAMMOPATHY: ICD-10-CM

## 2022-09-01 DIAGNOSIS — D72.819 LEUKOPENIA, UNSPECIFIED TYPE: ICD-10-CM

## 2022-09-01 DIAGNOSIS — E78.00 PURE HYPERCHOLESTEROLEMIA: ICD-10-CM

## 2022-09-01 DIAGNOSIS — Z00.00 WELL ADULT EXAM: Primary | ICD-10-CM

## 2022-09-01 DIAGNOSIS — R82.90 ABNORMAL RESULT ON SCREENING URINE TEST: ICD-10-CM

## 2022-09-01 DIAGNOSIS — F43.12 CHRONIC POST-TRAUMATIC STRESS DISORDER (PTSD): ICD-10-CM

## 2022-09-01 PROCEDURE — 99396 PREV VISIT EST AGE 40-64: CPT | Performed by: FAMILY MEDICINE

## 2022-09-01 PROCEDURE — 99214 OFFICE O/P EST MOD 30 MIN: CPT | Performed by: FAMILY MEDICINE

## 2022-09-01 RX ORDER — LORAZEPAM 1 MG/1
1 TABLET ORAL 2 TIMES DAILY PRN
COMMUNITY
Start: 2022-08-09

## 2022-09-01 NOTE — PROGRESS NOTES
Assessment/Plan:       No problem-specific Assessment & Plan notes found for this encounter  Diagnoses and all orders for this visit:    Well adult exam    Monoclonal gammopathy  Comments:  Patient is following with Hematology    Pure hypercholesterolemia  Comments:  Patient to call me with the name of the medication is taking  To follow with low-fat diet    Low thyroid stimulating hormone (TSH) level  Comments:  Corrected    Leukopenia, unspecified type  Comments: Following with Hematology    Bradycardia  Comments:  stable ,had holter monitor in the past and was evaluated by  cardiology    Immunization counseling  Comments:  recommend T dap  pt declined    Encounter for screening mammogram for malignant neoplasm of breast  Comments:   patient has an order for mammogram from her gyn    Abnormal result on screening urine test  Comments:  Borderline a urine test for pregnancy   Orders:  -     Pregnancy Test (HCG Qualitative); Future    Colon cancer screening  -     Cancel: Ambulatory Referral to Gastroenterology; Future    Hypokalemia  -     Potassium; Future    Chronic post-traumatic stress disorder (PTSD)  Comments:  declined med  Following with conseler    Anxiety  Comments:  Doing better  Follow a counselor    Other orders  -     LORazepam (ATIVAN) 1 mg tablet; Take 1 mg by mouth 2 (two) times a day as needed        Patient Instructions    Follow up with  test results      Orders Placed This Encounter   Procedures    Pregnancy Test (HCG Qualitative)     Standing Status:   Future     Number of Occurrences:   1     Standing Expiration Date:   9/1/2023    Potassium     Standing Status:   Future     Number of Occurrences:   1     Standing Expiration Date:   9/1/2023         Subjective:     Patient ID: Deisy Wharton is a 46 y o  female      HPI   Follow-up  Abnormal renal function, denied hematuria or edema  Hyperlipidemia     Admit to low-fat diet patient does exercise   microscopic hematuria, was evaluated by urology   She started physical  therapyfor urgency  Monoclonal gammopathy, has an appointment with hematology  Anxiety   Controlled  Following with a counselor  She was seen in the emergency room on 08/09/2022 for being sick stressed out and for overwhelmed she has some argument with her family member  She denied depression now  She is following with the counselor  ER report on 08/09/2022 noted All test also done at the ER reviewed results with patient  Test results  Echo-doppler  Renal -bladder us   Pelvic us  Mammogram , not done   lab done 8-3-22  Discussed results with pt  Review of Systems   Constitutional: Negative for chills, diaphoresis and fatigue  HENT: Negative for ear pain, sore throat, trouble swallowing and voice change  Eyes: Negative for visual disturbance  Respiratory: Negative for cough, chest tightness and shortness of breath  Cardiovascular: Negative for chest pain, palpitations and leg swelling  Gastrointestinal: Negative for abdominal pain, blood in stool, constipation, diarrhea and nausea  Endocrine: Negative for polydipsia and polyuria  Genitourinary: Negative for dysuria, flank pain, frequency, hematuria, pelvic pain, urgency, vaginal bleeding and vaginal discharge  Musculoskeletal: Negative for arthralgias, back pain, gait problem, myalgias and neck pain  Skin: Negative for rash  Allergic/Immunologic: Negative for food allergies  Neurological: Negative for dizziness, tremors, seizures, weakness, light-headedness, numbness and headaches  Hematological: Negative for adenopathy  Does not bruise/bleed easily  Psychiatric/Behavioral: Negative for confusion and dysphoric mood  The patient is not nervous/anxious  Objective:     Physical Exam  Constitutional:       General: She is not in acute distress  Appearance: Normal appearance  She is well-developed  She is not ill-appearing  HENT:      Head: Normocephalic and atraumatic     Eyes: General: No scleral icterus  Conjunctiva/sclera: Conjunctivae normal       Pupils: Pupils are equal, round, and reactive to light  Neck:      Thyroid: No thyromegaly  Vascular: No JVD  Cardiovascular:      Rate and Rhythm: Normal rate and regular rhythm  Heart sounds: Normal heart sounds  No murmur heard  Comments: Extremities  No edema  Pulmonary:      Effort: Pulmonary effort is normal       Breath sounds: Normal breath sounds  Abdominal:      Palpations: Abdomen is soft  There is no mass  Tenderness: There is no abdominal tenderness  There is no guarding or rebound  Hernia: No hernia is present  Musculoskeletal:      Right lower leg: No edema  Left lower leg: No edema  Lymphadenopathy:      Cervical: No cervical adenopathy  Skin:     Coloration: Skin is not pale  Findings: No rash  Neurological:      Mental Status: She is alert and oriented to person, place, and time  Cranial Nerves: No cranial nerve deficit  Sensory: No sensory deficit  Motor: No weakness or abnormal muscle tone  Coordination: Coordination normal       Gait: Gait normal    Psychiatric:         Mood and Affect: Mood normal          Behavior: Behavior normal          Thought Content:  Thought content normal          Judgment: Judgment normal

## 2022-09-02 ENCOUNTER — APPOINTMENT (OUTPATIENT)
Dept: LAB | Facility: HOSPITAL | Age: 52
End: 2022-09-02
Attending: FAMILY MEDICINE
Payer: COMMERCIAL

## 2022-09-02 DIAGNOSIS — R82.90 ABNORMAL RESULT ON SCREENING URINE TEST: ICD-10-CM

## 2022-09-02 DIAGNOSIS — E87.6 HYPOKALEMIA: ICD-10-CM

## 2022-09-02 LAB
HCG SERPL QL: NEGATIVE
POTASSIUM SERPL-SCNC: 4.2 MMOL/L (ref 3.5–5.3)

## 2022-09-02 PROCEDURE — 84132 ASSAY OF SERUM POTASSIUM: CPT

## 2022-09-02 PROCEDURE — 84703 CHORIONIC GONADOTROPIN ASSAY: CPT

## 2022-09-02 PROCEDURE — 36415 COLL VENOUS BLD VENIPUNCTURE: CPT

## 2022-09-06 ENCOUNTER — OFFICE VISIT (OUTPATIENT)
Dept: PODIATRY | Facility: CLINIC | Age: 52
End: 2022-09-06
Payer: COMMERCIAL

## 2022-09-06 ENCOUNTER — APPOINTMENT (OUTPATIENT)
Dept: RADIOLOGY | Age: 52
End: 2022-09-06
Payer: COMMERCIAL

## 2022-09-06 VITALS
BODY MASS INDEX: 22.66 KG/M2 | DIASTOLIC BLOOD PRESSURE: 80 MMHG | WEIGHT: 120 LBS | HEIGHT: 61 IN | SYSTOLIC BLOOD PRESSURE: 131 MMHG | HEART RATE: 57 BPM

## 2022-09-06 DIAGNOSIS — M20.12 HALLUX VALGUS WITH BUNIONS OF LEFT FOOT: Primary | ICD-10-CM

## 2022-09-06 DIAGNOSIS — M20.61 ACQUIRED DEFORMITY OF RIGHT TOE: ICD-10-CM

## 2022-09-06 DIAGNOSIS — M79.672 PAIN IN BOTH FEET: ICD-10-CM

## 2022-09-06 DIAGNOSIS — M21.612 HALLUX VALGUS WITH BUNIONS OF LEFT FOOT: ICD-10-CM

## 2022-09-06 DIAGNOSIS — M21.612 HALLUX VALGUS WITH BUNIONS OF LEFT FOOT: Primary | ICD-10-CM

## 2022-09-06 DIAGNOSIS — M79.671 PAIN IN BOTH FEET: ICD-10-CM

## 2022-09-06 DIAGNOSIS — M20.12 HALLUX VALGUS WITH BUNIONS OF LEFT FOOT: ICD-10-CM

## 2022-09-06 PROCEDURE — 73630 X-RAY EXAM OF FOOT: CPT

## 2022-09-06 PROCEDURE — 99203 OFFICE O/P NEW LOW 30 MIN: CPT | Performed by: PODIATRIST

## 2022-09-06 RX ORDER — BISACODYL 5 MG
TABLET, DELAYED RELEASE (ENTERIC COATED) ORAL
COMMUNITY
Start: 2022-09-06 | End: 2022-09-30

## 2022-09-06 NOTE — PROGRESS NOTES
PATIENT:  Sneha Alcantara  1970       ASSESSMENT:     1  Hallux valgus with bunions of left foot  XR foot 3+ vw left   2  Acquired deformity of right toe  XR foot 3+ vw right   3  Pain in both feet           PLAN:  1  Patient was counseled and educated on the condition and the diagnosis  2  X-ray was obtained and personally reviewed  The radiological findings were discussed with the patient  3  The diagnosis, treatment options and prognosis were discussed with the patient  4  Discussed options including conservative care and surgical correction of foot deformity  5  She wishes to proceed with surgical treatment  She would need Federico bunionectomy with possible osteotomy of proximal phalanx of great toe for her left foot  She would need Akin bunionectomy and derotational arthroplasty of 5th toe on right foot  Discussed surgical details and post-op course  6  Will plan OR in November or December  Instructed proper footwear and supportive care  Imaging: I have personally reviewed pertinent films in PACS  Labs, pathology, and Other Studies: I have personally reviewed pertinent reports  Subjective:     HPI  The patient was referred to my office for treatment of foot deformity  She has a chief complaint of painful bunion on left foot  She had it for years  Increased pain in the last few years  Throbbing sensation around left great toe joint with walking  She has a trouble wearing closed shoes  She notices calluses in great toes with mild numbness  She also complained of pain on right 5th toe  She notices a corn in the area  Pain presents when she wears any closed shoes  She tried different shoes and pads without reliving her pain  No acute swelling  No significant weakness or dysfunction  She would consider surgical treatment at this time           The following portions of the patient's history were reviewed and updated as appropriate: allergies, current medications, past family history, past medical history, past social history, past surgical history and problem list   All pertinent labs and images were reviewed  Past Medical History  Past Medical History:   Diagnosis Date    Bradycardia     Depression     Hypotension        Past Surgical History  Past Surgical History:   Procedure Laterality Date    BREAST RECONSTRUCTION      "breast lift" June 2020    HEMORROIDECTOMY      WRIST SURGERY Right         Allergies:  Crestor [rosuvastatin]    Medications:  Current Outpatient Medications   Medication Sig Dispense Refill    amLODIPine (NORVASC) 5 mg tablet Take 1 tablet (5 mg total) by mouth daily 30 tablet 5    bisacodyl 5 MG EC tablet       levonorgestrel (MIRENA) 20 MCG/24HR IUD 1 each by Intrauterine route once        LORazepam (ATIVAN) 1 mg tablet Take 1 mg by mouth 2 (two) times a day as needed (Patient not taking: Reported on 9/6/2022)       No current facility-administered medications for this visit  Social History:  Social History     Socioeconomic History    Marital status:      Spouse name: None    Number of children: None    Years of education: None    Highest education level: None   Occupational History    None   Tobacco Use    Smoking status: Never Smoker    Smokeless tobacco: Never Used   Vaping Use    Vaping Use: Never used   Substance and Sexual Activity    Alcohol use: Yes     Alcohol/week: 3 0 standard drinks     Types: 3 Glasses of wine per week     Comment: social    Drug use: No    Sexual activity: Yes     Partners: Male     Birth control/protection: I U D     Other Topics Concern    None   Social History Narrative    None     Social Determinants of Health     Financial Resource Strain: Low Risk     Difficulty of Paying Living Expenses: Not hard at all   Food Insecurity: No Food Insecurity    Worried About Running Out of Food in the Last Year: Never true    Vadim of Food in the Last Year: Never true Transportation Needs: No Transportation Needs    Lack of Transportation (Medical): No    Lack of Transportation (Non-Medical): No   Physical Activity: Sufficiently Active    Days of Exercise per Week: 3 days    Minutes of Exercise per Session: 60 min   Stress: No Stress Concern Present    Feeling of Stress : Not at all   Social Connections: Not on file   Intimate Partner Violence: Not At Risk    Fear of Current or Ex-Partner: No    Emotionally Abused: No    Physically Abused: No    Sexually Abused: No   Housing Stability: Low Risk     Unable to Pay for Housing in the Last Year: No    Number of Places Lived in the Last Year: 1    Unstable Housing in the Last Year: No          Review of Systems   Constitutional: Negative for appetite change, chills and fever  HENT: Negative for sore throat  Respiratory: Negative for cough and shortness of breath  Cardiovascular: Negative for chest pain and leg swelling  Gastrointestinal: Negative for diarrhea, nausea and vomiting  Musculoskeletal: Negative for joint swelling  Skin: Negative for rash and wound  Allergic/Immunologic: Negative for immunocompromised state  Neurological: Negative for weakness and numbness  Hematological: Negative  Psychiatric/Behavioral: Negative for behavioral problems and confusion  Objective:      /80   Pulse 57   Ht 5' 1" (1 549 m) Comment: verbal  Wt 54 4 kg (120 lb)   BMI 22 67 kg/m²          Physical Exam  Vitals reviewed  Constitutional:       General: She is not in acute distress  Appearance: Normal appearance  She is not ill-appearing  HENT:      Head: Normocephalic and atraumatic  Eyes:      Extraocular Movements: Extraocular movements intact  Cardiovascular:      Rate and Rhythm: Normal rate and regular rhythm  Pulses: Normal pulses  Dorsalis pedis pulses are 2+ on the right side and 2+ on the left side          Posterior tibial pulses are 2+ on the right side and 2+ on the left side  Pulmonary:      Effort: Pulmonary effort is normal  No respiratory distress  Musculoskeletal:         General: Tenderness and deformity present  No swelling or signs of injury  Cervical back: Normal range of motion and neck supple  Right lower leg: No edema  Left lower leg: No edema  Right foot: No foot drop  Left foot: No foot drop  Comments: Bunion / HAV presents, left worse than right  Abduction of great toes with HPK at IPJ  Adductovarus 5th toe, right worse than left  No hypermobility at 1st ray bilaterally  Skin:     General: Skin is warm  Capillary Refill: Capillary refill takes less than 2 seconds  Coloration: Skin is not cyanotic or mottled  Findings: No abscess, ecchymosis, erythema or wound  Nails: There is no clubbing  Neurological:      General: No focal deficit present  Mental Status: She is alert and oriented to person, place, and time  Cranial Nerves: No cranial nerve deficit  Sensory: No sensory deficit  Motor: No weakness  Coordination: Coordination normal    Psychiatric:         Mood and Affect: Mood normal          Behavior: Behavior normal          Thought Content:  Thought content normal          Judgment: Judgment normal

## 2022-09-07 ENCOUNTER — TELEPHONE (OUTPATIENT)
Dept: HEMATOLOGY ONCOLOGY | Facility: CLINIC | Age: 52
End: 2022-09-07

## 2022-09-07 ENCOUNTER — TELEPHONE (OUTPATIENT)
Dept: LAB | Facility: HOSPITAL | Age: 52
End: 2022-09-07

## 2022-09-07 ENCOUNTER — APPOINTMENT (OUTPATIENT)
Dept: LAB | Facility: HOSPITAL | Age: 52
End: 2022-09-07
Payer: COMMERCIAL

## 2022-09-07 DIAGNOSIS — D47.2 MONOCLONAL GAMMOPATHY: ICD-10-CM

## 2022-09-07 LAB
ALBUMIN SERPL BCP-MCNC: 3.9 G/DL (ref 3.5–5)
ALP SERPL-CCNC: 80 U/L (ref 43–122)
ALT SERPL W P-5'-P-CCNC: 21 U/L
ANION GAP SERPL CALCULATED.3IONS-SCNC: 4 MMOL/L (ref 5–14)
AST SERPL W P-5'-P-CCNC: 26 U/L (ref 14–36)
BASOPHILS # BLD AUTO: 0.04 THOUSANDS/ΜL (ref 0–0.1)
BASOPHILS NFR BLD AUTO: 1 % (ref 0–1)
BILIRUB SERPL-MCNC: 0.27 MG/DL (ref 0.2–1)
BUN SERPL-MCNC: 13 MG/DL (ref 5–25)
CALCIUM SERPL-MCNC: 8.6 MG/DL (ref 8.4–10.2)
CHLORIDE SERPL-SCNC: 106 MMOL/L (ref 96–108)
CO2 SERPL-SCNC: 27 MMOL/L (ref 21–32)
CREAT SERPL-MCNC: 0.68 MG/DL (ref 0.6–1.2)
EOSINOPHIL # BLD AUTO: 0.09 THOUSAND/ΜL (ref 0–0.61)
EOSINOPHIL NFR BLD AUTO: 2 % (ref 0–6)
ERYTHROCYTE [DISTWIDTH] IN BLOOD BY AUTOMATED COUNT: 12.6 % (ref 11.6–15.1)
FERRITIN SERPL-MCNC: 53 NG/ML (ref 8–388)
GFR SERPL CREATININE-BSD FRML MDRD: 100 ML/MIN/1.73SQ M
GLUCOSE P FAST SERPL-MCNC: 91 MG/DL (ref 70–99)
HCT VFR BLD AUTO: 37 % (ref 34.8–46.1)
HGB BLD-MCNC: 12.2 G/DL (ref 11.5–15.4)
IGA SERPL-MCNC: 183 MG/DL (ref 70–400)
IGG SERPL-MCNC: 1170 MG/DL (ref 700–1600)
IGM SERPL-MCNC: 113 MG/DL (ref 40–230)
IMM GRANULOCYTES # BLD AUTO: 0.01 THOUSAND/UL (ref 0–0.2)
IMM GRANULOCYTES NFR BLD AUTO: 0 % (ref 0–2)
IRON SATN MFR SERPL: 16 % (ref 15–50)
IRON SERPL-MCNC: 52 UG/DL (ref 50–170)
LYMPHOCYTES # BLD AUTO: 2.11 THOUSANDS/ΜL (ref 0.6–4.47)
LYMPHOCYTES NFR BLD AUTO: 46 % (ref 14–44)
MCH RBC QN AUTO: 30.7 PG (ref 26.8–34.3)
MCHC RBC AUTO-ENTMCNC: 33 G/DL (ref 31.4–37.4)
MCV RBC AUTO: 93 FL (ref 82–98)
MONOCYTES # BLD AUTO: 0.31 THOUSAND/ΜL (ref 0.17–1.22)
MONOCYTES NFR BLD AUTO: 7 % (ref 4–12)
NEUTROPHILS # BLD AUTO: 2.04 THOUSANDS/ΜL (ref 1.85–7.62)
NEUTS SEG NFR BLD AUTO: 44 % (ref 43–75)
NRBC BLD AUTO-RTO: 0 /100 WBCS
PLATELET # BLD AUTO: 152 THOUSANDS/UL (ref 149–390)
PMV BLD AUTO: 11.1 FL (ref 8.9–12.7)
POTASSIUM SERPL-SCNC: 4 MMOL/L (ref 3.5–5.3)
PROT SERPL-MCNC: 7.3 G/DL (ref 6.4–8.4)
RBC # BLD AUTO: 3.97 MILLION/UL (ref 3.81–5.12)
SODIUM SERPL-SCNC: 137 MMOL/L (ref 135–147)
TIBC SERPL-MCNC: 319 UG/DL (ref 250–450)
WBC # BLD AUTO: 4.6 THOUSAND/UL (ref 4.31–10.16)

## 2022-09-07 PROCEDURE — 84165 PROTEIN E-PHORESIS SERUM: CPT

## 2022-09-07 PROCEDURE — 84166 PROTEIN E-PHORESIS/URINE/CSF: CPT

## 2022-09-07 PROCEDURE — 83521 IG LIGHT CHAINS FREE EACH: CPT

## 2022-09-07 PROCEDURE — 86334 IMMUNOFIX E-PHORESIS SERUM: CPT

## 2022-09-07 PROCEDURE — 36415 COLL VENOUS BLD VENIPUNCTURE: CPT

## 2022-09-07 PROCEDURE — 83550 IRON BINDING TEST: CPT

## 2022-09-07 PROCEDURE — 80053 COMPREHEN METABOLIC PANEL: CPT

## 2022-09-07 PROCEDURE — 82784 ASSAY IGA/IGD/IGG/IGM EACH: CPT

## 2022-09-07 PROCEDURE — 83540 ASSAY OF IRON: CPT

## 2022-09-07 PROCEDURE — 82232 ASSAY OF BETA-2 PROTEIN: CPT

## 2022-09-07 PROCEDURE — 82728 ASSAY OF FERRITIN: CPT

## 2022-09-07 PROCEDURE — 85025 COMPLETE CBC W/AUTO DIFF WBC: CPT

## 2022-09-07 NOTE — TELEPHONE ENCOUNTER
I spoke with the patient in regards to her lab work that needs to be completed prior to her appt on 9/9/22 at 2:00pm  The patient states she will get it done today

## 2022-09-08 ENCOUNTER — OFFICE VISIT (OUTPATIENT)
Dept: PHYSICAL THERAPY | Facility: REHABILITATION | Age: 52
End: 2022-09-08
Payer: COMMERCIAL

## 2022-09-08 DIAGNOSIS — M62.81 MUSCLE WEAKNESS: ICD-10-CM

## 2022-09-08 DIAGNOSIS — N39.41 URGE INCONTINENCE: Primary | ICD-10-CM

## 2022-09-08 LAB
ALBUMIN SERPL ELPH-MCNC: 4.12 G/DL (ref 3.5–5)
ALBUMIN SERPL ELPH-MCNC: 59.7 % (ref 52–65)
ALBUMIN UR ELPH-MCNC: 100 %
ALPHA1 GLOB MFR UR ELPH: 0 %
ALPHA1 GLOB SERPL ELPH-MCNC: 0.25 G/DL (ref 0.1–0.4)
ALPHA1 GLOB SERPL ELPH-MCNC: 3.6 % (ref 2.5–5)
ALPHA2 GLOB MFR UR ELPH: 0 %
ALPHA2 GLOB SERPL ELPH-MCNC: 0.7 G/DL (ref 0.4–1.2)
ALPHA2 GLOB SERPL ELPH-MCNC: 10.1 % (ref 7–13)
B-GLOBULIN MFR UR ELPH: 0 %
BETA GLOB ABNORMAL SERPL ELPH-MCNC: 0.37 G/DL (ref 0.4–0.8)
BETA1 GLOB SERPL ELPH-MCNC: 5.3 % (ref 5–13)
BETA2 GLOB SERPL ELPH-MCNC: 4.2 % (ref 2–8)
BETA2+GAMMA GLOB SERPL ELPH-MCNC: 0.29 G/DL (ref 0.2–0.5)
GAMMA GLOB ABNORMAL SERPL ELPH-MCNC: 1.18 G/DL (ref 0.5–1.6)
GAMMA GLOB MFR UR ELPH: 0 %
GAMMA GLOB SERPL ELPH-MCNC: 17.1 % (ref 12–22)
IGG/ALB SER: 1.48 {RATIO} (ref 1.1–1.8)
INTERPRETATION UR IFE-IMP: NORMAL
KAPPA LC FREE SER-MCNC: 17.3 MG/L (ref 3.3–19.4)
KAPPA LC FREE/LAMBDA FREE SER: 1.28 {RATIO} (ref 0.26–1.65)
LAMBDA LC FREE SERPL-MCNC: 13.5 MG/L (ref 5.7–26.3)
M PROTEIN 1 MFR SERPL ELPH: 5 %
M PROTEIN 1 SERPL ELPH-MCNC: 0.35 G/DL
PROT PATTERN SERPL ELPH-IMP: ABNORMAL
PROT PATTERN UR ELPH-IMP: NORMAL
PROT SERPL-MCNC: 6.9 G/DL (ref 6.4–8.2)
PROT UR-MCNC: 7 MG/DL

## 2022-09-08 PROCEDURE — 84165 PROTEIN E-PHORESIS SERUM: CPT | Performed by: PATHOLOGY

## 2022-09-08 PROCEDURE — 86334 IMMUNOFIX E-PHORESIS SERUM: CPT | Performed by: PATHOLOGY

## 2022-09-08 PROCEDURE — 84166 PROTEIN E-PHORESIS/URINE/CSF: CPT | Performed by: PATHOLOGY

## 2022-09-08 PROCEDURE — 97530 THERAPEUTIC ACTIVITIES: CPT | Performed by: PHYSICAL THERAPIST

## 2022-09-08 PROCEDURE — 97112 NEUROMUSCULAR REEDUCATION: CPT | Performed by: PHYSICAL THERAPIST

## 2022-09-08 NOTE — PROGRESS NOTES
Daily Note     Today's date: 2022  Patient name: Umu Nichole  : 1970  MRN: [id number]  Referring provider: WALTER Castillo  Dx:   Encounter Diagnosis     ICD-10-CM    1  Urge incontinence  N39 41    2  Muscle weakness  M62 81        Start Time: 1552  Stop Time: 1645  Total time in clinic (min): 53 minutes    Subjective: Pt completed bladder diary but forgot it at home  She did notice some triggers of running water/getting into the shower  She is trying to ignore the urge to pee sometimes  She thinks she was peeing about 8-9x/day  She thinks she was peeing more frequently in the morning, going every 1-2 hours an din the afternoon every 3-4 hours  She reports she is sleeping a little more so she was not peeing as much in the middle of the night  She did not have any leakage this week  Objective: See treatment diary below      Assessment: Tolerated treatment well  Initiated urge deferral strategy for patient to focus on to extend interovid interval, decrease cues related to urgency like running water/shower and to make it to the bathroom without urinary leakage  Also initiated The knack for pt to focus on PFM contraction prior to and during a cough, sneeze, and laugh  Pt can also use The Knack and PFM contraction prior to a lift and transfer to minimize downward pressure on PFM  Pt cued on managing IAP and focus on exhale on exertion during ADL's and transfersto manage stress incontinence  Pt focused on PFM contraction and coordination with breath control during supine to sit and sit to stand transfer  Pt also focused on PFM awareness in sitting and standing and supine  Pt to focus on PFM checkins to practice relaxation of her PFM during her day  Initiated LE strengthening with coordination of PFM and TAC with breath control  Plan: Continue per plan of care  Assess compliance with PFM check-ins, exhale on exertion, urge deferral and the knack          Precautions: hypotension  Impairments/functional limitations: urge incontinece, stress incontinence, urgency, frequency, L proximal hip weakness  Daily Treatment Diary    Manuals 8/30 9/8        External assess prn         Internal assess prn                             Neuro Re-Ed          TAC          PF  Contract, relax, lengthen in siting, standing, supine, with ADL's, with transfers         TAC+PF  review        TAC+PF with SLR  1x10 ea        TAC+PF with s/l ABD          TAC+PF with bridge                              Diaphragmatic breathing nv Review, sitting, standing, supine         Ther Ex          Supine piri stretch          LTR          Seated piri stretch                                                            Ther Activity          Bladder Diary  review nv        Urge delay:QF nv review        Urge delay: HR nv         Freeze, breathe, squeeze  nv review        The knack nv Review         PFM checkins  Review         Healthy bladder habits: water intake, hovering,urgency review         Gait Training                              Modalities

## 2022-09-09 ENCOUNTER — OFFICE VISIT (OUTPATIENT)
Dept: HEMATOLOGY ONCOLOGY | Facility: CLINIC | Age: 52
End: 2022-09-09
Payer: COMMERCIAL

## 2022-09-09 VITALS
BODY MASS INDEX: 23.11 KG/M2 | HEART RATE: 51 BPM | OXYGEN SATURATION: 100 % | WEIGHT: 122.4 LBS | SYSTOLIC BLOOD PRESSURE: 120 MMHG | TEMPERATURE: 97.6 F | HEIGHT: 61 IN | RESPIRATION RATE: 18 BRPM | DIASTOLIC BLOOD PRESSURE: 64 MMHG

## 2022-09-09 DIAGNOSIS — D47.2 MONOCLONAL GAMMOPATHY: Primary | ICD-10-CM

## 2022-09-09 DIAGNOSIS — D50.8 OTHER IRON DEFICIENCY ANEMIA: ICD-10-CM

## 2022-09-09 LAB — B2 MICROGLOB SERPL-MCNC: 0.9 MG/L (ref 0.6–2.4)

## 2022-09-09 PROCEDURE — 99214 OFFICE O/P EST MOD 30 MIN: CPT | Performed by: NURSE PRACTITIONER

## 2022-09-09 NOTE — PROGRESS NOTES
Hematology/Oncology Outpatient Follow-up  Eduardo Zamorano 46 y o  female 1970 [id number]    Date:  9/9/2022      Assessment and Plan:  1  Monoclonal gammopathy  Continues to have stable MGUS IgG kappa 0 35 grams/deciliter on SPEP/immunofixation; concentration is decreased in comparison to her prior study  Her remaining plasma cell dyscrasia workup is normal   No hint of end-organ damage  She seems to have low risk MGUS estimated 5% absolute risk of progression at 20 years  She was told that we will continue to monitor her laboratory studies on a regular basis  Request she follow up again with repeat labs in 6 months  Can likely see her less frequently in the future (perhaps annually) if her labs continue to be stable  - CBC and differential; Future  - Comprehensive metabolic panel; Future  - LD,Blood; Future  - IgG, IgA, IgM; Future  - Immunoglobulin free LT chains blood; Future  - Protein electrophoresis, serum; Future  - C-reactive protein; Future  - Sedimentation rate, automated; Future    2  Other iron deficiency anemia  Patient is not currently anemic her stores are borderline iron saturation 16% ferritin 53  Denies any obvious bleeding from any site  She is scheduled for colonoscopy later this month  She states that she does have a history of anemia/iron deficiency for many years  Recommended she resume her oral iron supplement once or twice a week if she can tolerate without any increased GI upset  Will continue to monitor     - CBC and differential; Future  - Comprehensive metabolic panel; Future  - Iron Panel (Includes Ferritin, Iron Sat%, Iron, and TIBC); Future  - Ferritin; Future  - Vitamin B12; Future  - C-reactive protein; Future  - Sedimentation rate, automated; Future    HPI:  This is a 60-year-old female with a history of bradycardia, hypotension, and 6 pregnancies with 5 full-term deliveries   The patient also seems to have chronic anemia with iron deficiency which is being treated with oral iron supplements on and off   She also had extensive workup by her PCP 2019 including a serum protein electrophoresis which showed an M spike   The serum immunofixation showed IgG kappa monoclonal gammopathy at the 0 5 grams/deciliter   The UPEP was negative for monoclonal protein      Interval history:  Presents today for a follow-up visit after being lost to follow-up for some time  She was last seen in office April 2022  She states that she has been having some GI issues/loose stools for some time and is scheduled for colonoscopy later this month  Otherwise she has no new complaints  Denies bleeding from any site  He does have some mild fatigue but no more than her usual     Her most recent laboratory studies from 09/07/2022 showed normal WBC 4 6, she is not anemic H&H 12 2/37, MCV 93 with normal platelet count 040  CMP is essentially normal   She has low normal iron stores iron saturation 16% ferritin 53  Beta 2 microglobulin and immunoglobulins are in the normal range  Her serum free light chains are in the normal range  SPEP continues to show monoclonal peak in the gamma region; repeat immunofixation revealed IgG kappa 0 35 grams/deciliter  Her urine protein electrophoresis did not reveal any monoclonal protein  ROS: Review of Systems   Constitutional: Positive for fatigue  Negative for activity change, appetite change, chills, fever and unexpected weight change  HENT: Negative for congestion, mouth sores, nosebleeds, sore throat and trouble swallowing  Eyes: Negative  Respiratory: Negative for cough, chest tightness and shortness of breath  Cardiovascular: Negative for chest pain, palpitations and leg swelling  Gastrointestinal: Positive for diarrhea  Negative for abdominal distention, abdominal pain, blood in stool, constipation, nausea and vomiting  Genitourinary: Negative for difficulty urinating, dysuria, frequency, hematuria and urgency     Musculoskeletal: Positive for arthralgias  Negative for back pain, gait problem, joint swelling and myalgias  Skin: Negative for color change, pallor and rash  Neurological: Negative for dizziness, weakness, light-headedness, numbness and headaches  Hematological: Negative for adenopathy  Does not bruise/bleed easily  Psychiatric/Behavioral: Positive for dysphoric mood and sleep disturbance  Past Medical History:   Diagnosis Date    Bradycardia     Depression     Hypotension        Past Surgical History:   Procedure Laterality Date    BREAST RECONSTRUCTION      "breast lift" June 2020    HEMORROIDECTOMY      WRIST SURGERY Right        Social History     Socioeconomic History    Marital status:      Spouse name: None    Number of children: None    Years of education: None    Highest education level: None   Occupational History    None   Tobacco Use    Smoking status: Never Smoker    Smokeless tobacco: Never Used   Vaping Use    Vaping Use: Never used   Substance and Sexual Activity    Alcohol use: Yes     Alcohol/week: 3 0 standard drinks     Types: 3 Glasses of wine per week     Comment: social    Drug use: No    Sexual activity: Yes     Partners: Male     Birth control/protection: I U D  Other Topics Concern    None   Social History Narrative    None     Social Determinants of Health     Financial Resource Strain: Low Risk     Difficulty of Paying Living Expenses: Not hard at all   Food Insecurity: No Food Insecurity    Worried About Running Out of Food in the Last Year: Never true    Vadim of Food in the Last Year: Never true   Transportation Needs: No Transportation Needs    Lack of Transportation (Medical): No    Lack of Transportation (Non-Medical):  No   Physical Activity: Sufficiently Active    Days of Exercise per Week: 3 days    Minutes of Exercise per Session: 60 min   Stress: No Stress Concern Present    Feeling of Stress : Not at all   Social Connections: Not on file   Intimate Partner Violence: Not At Risk    Fear of Current or Ex-Partner: No    Emotionally Abused: No    Physically Abused: No    Sexually Abused: No   Housing Stability: Low Risk     Unable to Pay for Housing in the Last Year: No    Number of Places Lived in the Last Year: 1    Unstable Housing in the Last Year: No       Family History   Problem Relation Age of Onset    Hypertension Maternal Grandmother     Diabetes Paternal Grandmother     No Known Problems Mother     No Known Problems Father     No Known Problems Sister     No Known Problems Daughter     No Known Problems Maternal Grandfather     No Known Problems Paternal Grandfather     No Known Problems Sister     No Known Problems Son     No Known Problems Son     No Known Problems Son     No Known Problems Son        Allergies   Allergen Reactions    Crestor [Rosuvastatin]      Upset stomach and diet         Current Outpatient Medications:     amLODIPine (NORVASC) 5 mg tablet, Take 1 tablet (5 mg total) by mouth daily, Disp: 30 tablet, Rfl: 5    levonorgestrel (MIRENA) 20 MCG/24HR IUD, 1 each by Intrauterine route once  , Disp: , Rfl:     bisacodyl 5 MG EC tablet, , Disp: , Rfl:     LORazepam (ATIVAN) 1 mg tablet, Take 1 mg by mouth 2 (two) times a day as needed (Patient not taking: No sig reported), Disp: , Rfl:       Physical Exam:  /64 (BP Location: Left arm, Patient Position: Sitting, Cuff Size: Adult)   Pulse (!) 51   Temp 97 6 °F (36 4 °C) (Temporal)   Resp 18   Ht 5' 1" (1 549 m)   Wt 55 5 kg (122 lb 6 4 oz)   SpO2 100%   BMI 23 13 kg/m²     Physical Exam  Vitals reviewed  Constitutional:       General: She is not in acute distress  Appearance: She is well-developed  She is not diaphoretic  HENT:      Head: Normocephalic and atraumatic  Eyes:      General: No scleral icterus  Conjunctiva/sclera: Conjunctivae normal       Pupils: Pupils are equal, round, and reactive to light     Neck:      Thyroid: No thyromegaly  Cardiovascular:      Rate and Rhythm: Regular rhythm  Bradycardia present  Heart sounds: Normal heart sounds  No murmur heard  Pulmonary:      Effort: Pulmonary effort is normal  No respiratory distress  Breath sounds: Normal breath sounds  Chest:   Breasts:      Right: No axillary adenopathy  Left: No axillary adenopathy  Abdominal:      General: Abdomen is flat  There is no distension  Palpations: Abdomen is soft  There is no hepatomegaly or splenomegaly  Tenderness: There is no abdominal tenderness  Musculoskeletal:         General: No swelling  Normal range of motion  Cervical back: Normal range of motion and neck supple  Lymphadenopathy:      Cervical: No cervical adenopathy  Upper Body:      Right upper body: No axillary adenopathy  Left upper body: No axillary adenopathy  Skin:     General: Skin is warm and dry  Findings: No erythema or rash  Neurological:      General: No focal deficit present  Mental Status: She is alert and oriented to person, place, and time  Psychiatric:         Mood and Affect: Mood and affect normal          Behavior: Behavior normal  Behavior is cooperative  Thought Content: Thought content normal          Judgment: Judgment normal            Labs:  Lab Results   Component Value Date    WBC 4 60 09/07/2022    HGB 12 2 09/07/2022    HCT 37 0 09/07/2022    MCV 93 09/07/2022     09/07/2022     Lab Results   Component Value Date    K 4 0 09/07/2022     09/07/2022    CO2 27 09/07/2022    BUN 13 09/07/2022    CREATININE 0 68 09/07/2022    GLUF 91 09/07/2022    CALCIUM 8 6 09/07/2022    AST 26 09/07/2022    ALT 21 09/07/2022    ALKPHOS 80 09/07/2022    EGFR 100 09/07/2022       Patient voiced understanding and agreement in the above discussion  Aware to contact our office with questions/symptoms in the interim  This note has been generated by voice recognition software system  Therefore, there may be spelling, grammar, and or syntax errors  Please contact if questions arise

## 2022-09-14 ENCOUNTER — OFFICE VISIT (OUTPATIENT)
Dept: PHYSICAL THERAPY | Facility: REHABILITATION | Age: 52
End: 2022-09-14
Payer: COMMERCIAL

## 2022-09-14 DIAGNOSIS — N39.41 URGE INCONTINENCE: Primary | ICD-10-CM

## 2022-09-14 DIAGNOSIS — M62.81 MUSCLE WEAKNESS: ICD-10-CM

## 2022-09-14 PROCEDURE — 97530 THERAPEUTIC ACTIVITIES: CPT | Performed by: PHYSICAL THERAPIST

## 2022-09-14 PROCEDURE — 97112 NEUROMUSCULAR REEDUCATION: CPT | Performed by: PHYSICAL THERAPIST

## 2022-09-14 NOTE — PROGRESS NOTES
Daily Note     Today's date: 2022  Patient name: Ashu Koch  : 1970  MRN: [id number]  Referring provider: WALTER uDque  Dx:   Encounter Diagnosis     ICD-10-CM    1  Urge incontinence  N39 41    2  Muscle weakness  M62 81        Start Time:   Stop Time: 955  Total time in clinic (min): 41 minutes    Subjective: Pt brought her bladder diary into PT today  Pt reports she is using her FBS and reports it is working  She is trying to perform her PFM contraction and breathing during lifts/transfers  Pt did not have nay leakage since last visit  Objective: See treatment diary below      Assessment: Tolerated treatment well  Patient presents with urge and stress incontinence episodes in her bladder  Pt to use FBS with triggers associated with urgency such as coming home, leaving home, washing hands and getting in the shower  Pt also to continue focusing on coordination of PFM and breathing with stress incontinence events like changing positions as well as coughing/sneezing/laughing  Pt required cueing during hip 3 way to coordinate TAC/PFM with breath control  Pt intermittently relaxing TAC during leg raise requiring cueing to maintain stabilization until LE is rested  Pt also required cueing to avoid compensation with ant pelvic tilt and increased lumbar lordosis during prone hip ext with focus on maintaining neutral spine  Pt to continue to focus on TAC/PFM and breath control with daily activities  Plan: Continue per plan of care          Precautions: hypotension  Impairments/functional limitations: urge incontinece, stress incontinence, urgency, frequency, L proximal hip weakness  Daily Treatment Diary    Manuals        External assess prn         Internal assess prn                             Neuro Re-Ed          TAC          PF  Contract, relax, lengthen in siting, standing, supine, with ADL's, with transfers         TAC+PF  review With exercises       TAC+PF with SLR  1x10 ea 1x10 ea       TAC+PF with s/l ABD   1x10 ea       TAC+PF with prone hip ext   1x10 ea       TAC+PF with bridge                    SLS          SLS with fwd lean          SLS with 5 way tap           Seated physioball pelvic tilt AP/ML/circles/neutralspine   nv       PB alt march   nv       PB alt LAQ   nv                 Diaphragmatic breathing nv Review, sitting, standing, supine         Ther Ex          Bike   5 mins L2                  Seated hs stretch   30"x1 ea       Seated piri stretch   30"x1 ea                 Standing hip ABD          Standing hip ext          Squatting          Lunges          Ther Activity          Bladder Diary  review nv review       Urge delay:QF nv review review       Urge delay: HR nv         Freeze, breathe, squeeze  nv review review       The radha nv Review  review       PFM checkins  Review         Healthy bladder habits: water intake, hovering,urgency review         Gait Training                              Modalities

## 2022-09-21 ENCOUNTER — APPOINTMENT (OUTPATIENT)
Dept: PHYSICAL THERAPY | Facility: REHABILITATION | Age: 52
End: 2022-09-21
Payer: COMMERCIAL

## 2022-09-26 ENCOUNTER — PREP FOR PROCEDURE (OUTPATIENT)
Dept: WOUND CARE | Facility: CLINIC | Age: 52
End: 2022-09-26

## 2022-09-26 DIAGNOSIS — Z01.818 PRE-OP TESTING: ICD-10-CM

## 2022-09-26 DIAGNOSIS — M20.12 ACQUIRED HALLUX VALGUS OF LEFT FOOT: Primary | ICD-10-CM

## 2022-09-26 DIAGNOSIS — M20.62 ACQUIRED DEFORMITY OF LEFT TOE: ICD-10-CM

## 2022-09-26 RX ORDER — CHLORHEXIDINE GLUCONATE 0.12 MG/ML
15 RINSE ORAL ONCE
OUTPATIENT
Start: 2022-12-16

## 2022-09-26 RX ORDER — CEFAZOLIN SODIUM 1 G/50ML
1000 SOLUTION INTRAVENOUS ONCE
OUTPATIENT
Start: 2022-12-16

## 2022-09-26 RX ORDER — CHLORHEXIDINE GLUCONATE 4 G/100ML
SOLUTION TOPICAL DAILY PRN
OUTPATIENT
Start: 2022-12-16

## 2022-09-28 ENCOUNTER — APPOINTMENT (OUTPATIENT)
Dept: PHYSICAL THERAPY | Facility: REHABILITATION | Age: 52
End: 2022-09-28
Payer: COMMERCIAL

## 2022-09-29 RX ORDER — SODIUM CHLORIDE 9 MG/ML
100 INJECTION, SOLUTION INTRAVENOUS CONTINUOUS
Status: CANCELLED | OUTPATIENT
Start: 2022-09-29

## 2022-09-30 ENCOUNTER — HOSPITAL ENCOUNTER (OUTPATIENT)
Dept: GASTROENTEROLOGY | Facility: HOSPITAL | Age: 52
Setting detail: OUTPATIENT SURGERY
End: 2022-09-30
Attending: COLON & RECTAL SURGERY
Payer: COMMERCIAL

## 2022-09-30 ENCOUNTER — ANESTHESIA (OUTPATIENT)
Dept: GASTROENTEROLOGY | Facility: HOSPITAL | Age: 52
End: 2022-09-30

## 2022-09-30 ENCOUNTER — ANESTHESIA EVENT (OUTPATIENT)
Dept: GASTROENTEROLOGY | Facility: HOSPITAL | Age: 52
End: 2022-09-30

## 2022-09-30 VITALS
DIASTOLIC BLOOD PRESSURE: 71 MMHG | BODY MASS INDEX: 23.03 KG/M2 | TEMPERATURE: 97.6 F | HEIGHT: 61 IN | RESPIRATION RATE: 16 BRPM | OXYGEN SATURATION: 99 % | HEART RATE: 55 BPM | WEIGHT: 122 LBS | SYSTOLIC BLOOD PRESSURE: 140 MMHG

## 2022-09-30 DIAGNOSIS — Z12.11 ENCOUNTER FOR SCREENING FOR MALIGNANT NEOPLASM OF COLON: ICD-10-CM

## 2022-09-30 LAB
EXT PREGNANCY TEST URINE: NEGATIVE
EXT. CONTROL: NORMAL

## 2022-09-30 PROCEDURE — 88305 TISSUE EXAM BY PATHOLOGIST: CPT | Performed by: PATHOLOGY

## 2022-09-30 PROCEDURE — 81025 URINE PREGNANCY TEST: CPT | Performed by: COLON & RECTAL SURGERY

## 2022-09-30 RX ORDER — PROPOFOL 10 MG/ML
INJECTION, EMULSION INTRAVENOUS AS NEEDED
Status: DISCONTINUED | OUTPATIENT
Start: 2022-09-30 | End: 2022-09-30

## 2022-09-30 RX ORDER — SODIUM CHLORIDE 9 MG/ML
100 INJECTION, SOLUTION INTRAVENOUS CONTINUOUS
Status: DISCONTINUED | OUTPATIENT
Start: 2022-09-30 | End: 2022-10-04 | Stop reason: HOSPADM

## 2022-09-30 RX ADMIN — SODIUM CHLORIDE 100 ML/HR: 0.9 INJECTION, SOLUTION INTRAVENOUS at 07:48

## 2022-09-30 RX ADMIN — PROPOFOL 20 MG: 10 INJECTION, EMULSION INTRAVENOUS at 10:12

## 2022-09-30 RX ADMIN — PROPOFOL 20 MG: 10 INJECTION, EMULSION INTRAVENOUS at 10:05

## 2022-09-30 RX ADMIN — PROPOFOL 20 MG: 10 INJECTION, EMULSION INTRAVENOUS at 10:07

## 2022-09-30 RX ADMIN — PROPOFOL 20 MG: 10 INJECTION, EMULSION INTRAVENOUS at 10:15

## 2022-09-30 RX ADMIN — PROPOFOL 20 MG: 10 INJECTION, EMULSION INTRAVENOUS at 10:09

## 2022-09-30 RX ADMIN — PROPOFOL 150 MG: 10 INJECTION, EMULSION INTRAVENOUS at 10:03

## 2022-09-30 NOTE — ANESTHESIA POSTPROCEDURE EVALUATION
Post-Op Assessment Note    CV Status:  Stable  Pain Score: 0    Pain management: adequate     Mental Status:  Alert and awake   Hydration Status:  Euvolemic   PONV Controlled:  Controlled   Airway Patency:  Patent      Post Op Vitals Reviewed: Yes      Staff: CRNA         No complications documented      BP   85/48   Temp 97   Pulse 54   Resp 12   SpO2 99

## 2022-09-30 NOTE — ANESTHESIA PREPROCEDURE EVALUATION
Procedure:  COLONOSCOPY    Relevant Problems   ANESTHESIA (within normal limits)      CARDIO   (+) Essential hypertension   (+) Nonrheumatic aortic valve insufficiency   (+) Pure hypercholesterolemia      HEMATOLOGY   (+) Anemia   (+) Iron deficiency anemia, unspecified      MUSCULOSKELETAL   (+) Acute bilateral low back pain without sciatica      NEURO/PSYCH   (+) Chronic left shoulder pain      PULMONARY   (+) VU (obstructive sleep apnea)        Physical Exam    Airway    Mallampati score: II  TM Distance: >3 FB  Neck ROM: full     Dental       Cardiovascular  Rate: normal,     Pulmonary  Pulmonary exam normal     Other Findings  Per pt denies anything remaining that is loose or removeable      Anesthesia Plan  ASA Score- 2     Anesthesia Type- IV sedation with anesthesia with ASA Monitors  Additional Monitors:   Airway Plan:     Comment: Per patient, appropriately NPO, denies active CP/SOB/wheezing/symptoms related to heartburn/nausea/vomiting  Plan Factors-Exercise tolerance (METS): >4 METS  Chart reviewed  Patient summary reviewed  Patient is not a current smoker  Induction- intravenous  Postoperative Plan-     Informed Consent- Anesthetic plan and risks discussed with patient  I personally reviewed this patient with the CRNA  Discussed and agreed on the Anesthesia Plan with the CRNA  Edelmira Marsh

## 2022-09-30 NOTE — DISCHARGE INSTRUCTIONS
[facility] [address], [address] [place] [address] [address]  Phone: [phone]    DISCHARGE INSTRUCTIONS:    1   __x_ Complete Exam - Normal    2   ___ Exam normal, but entire colon not seen  We will discuss this with you  3   ___ Polyp(s) removed by "burning" - NO pathology report will follow    4   ___ Polyp(s) removed by excision  Pathology report will be available in 4-5 days   Someone from our office will call you with results  5   ___ Exam prompted biopsies  Pathology report will be available in 4-5 days   Someone from our office will call you with results  6   ___ Exam demonstrated findings that need treatment  Prescriptions will be   Given to you  Return to our office in ____ weeks  Please call for appt  7   ___ Original office visit or colonoscopy findings necessitate an office visit  Please call to set up a new appointment    8   ___ Medication  __________________________________________        [address]:    - Go straight home and rest today    - No driving or drinking alcohol for 24 hours    - Resume regular diet and medications unless otherwise instructed  Coumadin and Plavix are blood thinners  You can resume these medications on __________      IF YOU ARE HAVING ANY FEVER, BLEEDING OR PERSISTENT PAIN IN THE ABDOMEN, PLEASE CALL OUR OFFICE ANY DAY OR TIME  [phone]

## 2022-09-30 NOTE — INTERVAL H&P NOTE
H&P reviewed  After examining the patient I find no changes in the patients condition since the H&P had been written      Vitals:    09/30/22 0739   BP: 131/61   Pulse: (!) 43   Resp: 18   Temp: 97 6 °F (36 4 °C)   SpO2: 100%

## 2022-10-07 PROCEDURE — 88305 TISSUE EXAM BY PATHOLOGIST: CPT | Performed by: PATHOLOGY

## 2022-10-12 PROBLEM — Z12.11 SPECIAL SCREENING FOR MALIGNANT NEOPLASMS, COLON: Status: RESOLVED | Noted: 2021-11-19 | Resolved: 2022-10-12

## 2023-01-19 ENCOUNTER — OFFICE VISIT (OUTPATIENT)
Dept: PODIATRY | Facility: CLINIC | Age: 53
End: 2023-01-19

## 2023-01-19 VITALS
DIASTOLIC BLOOD PRESSURE: 76 MMHG | BODY MASS INDEX: 23.03 KG/M2 | SYSTOLIC BLOOD PRESSURE: 137 MMHG | HEART RATE: 98 BPM | HEIGHT: 61 IN | WEIGHT: 122 LBS

## 2023-01-19 DIAGNOSIS — M20.12 HALLUX VALGUS WITH BUNIONS OF LEFT FOOT: Primary | ICD-10-CM

## 2023-01-19 DIAGNOSIS — M21.612 HALLUX VALGUS WITH BUNIONS OF LEFT FOOT: Primary | ICD-10-CM

## 2023-01-19 DIAGNOSIS — M20.61 ACQUIRED DEFORMITY OF RIGHT TOE: ICD-10-CM

## 2023-01-19 NOTE — PROGRESS NOTES
PATIENT:  Nir Mane  1970       ASSESSMENT:     1  Hallux valgus with bunions of left foot        2  Acquired deformity of right toe              PLAN:  1  Patient was counseled and educated on the condition and the diagnosis  2  X-ray was personally reviewed  The radiological findings were discussed with the patient  3  The diagnosis, treatment options and prognosis were discussed with the patient  4  Pt wishes to proceed with surgical treatment  Explained surgical details and post-op course  Discussed all risks and complications related to the patient's condition and surgery  The benefits of surgery were also discussed  The patient understood that the surgery would not guarantee desirable outcome  All questions and concerns were addressed and the consent was signed  5  Will start with left foot since she still needs to drive after surgery  6  Plan OR in Feb     Imaging: I have personally reviewed pertinent films in PACS  Labs, pathology, and Other Studies: I have personally reviewed pertinent reports  Subjective:     HPI  The patient presents for pre-op exam   She has chronic bunion on left foot  She also has pain on right 5th toe  She tried different shoes and pads without reliving her pain  No acute swelling  No significant weakness or dysfunction  The following portions of the patient's history were reviewed and updated as appropriate: allergies, current medications, past family history, past medical history, past social history, past surgical history and problem list   All pertinent labs and images were reviewed        Past Medical History  Past Medical History:   Diagnosis Date   • Anemia     iron deficiency anemia   • Anxiety    • Bradycardia    • Bradycardia    • Chronic diarrhea    • Chronic left shoulder pain    • Chronic nasal congestion    • Depression    • Disease of pericardium    • Elevated LFTs    • Excessive daytime sleepiness    • Hammer toe of right foot    • Hemorrhoids    • Hypercholesterolemia    • Hypertension    • Hypotension    • Insomnia    • Leukopenia    • Low back pain    • Monoclonal gammopathy    • Nonrheumatic aortic (valve) insufficiency    • Restless leg syndrome    • Sleep apnea    • Urinary frequency        Past Surgical History  Past Surgical History:   Procedure Laterality Date   • BREAST RECONSTRUCTION      "breast lift" June 2020   • HEMORROIDECTOMY     • WISDOM TOOTH EXTRACTION     • WRIST SURGERY Right         Allergies:  Atorvastatin and Crestor [rosuvastatin]    Medications:  Current Outpatient Medications   Medication Sig Dispense Refill   • amLODIPine (NORVASC) 5 mg tablet Take 1 tablet (5 mg total) by mouth daily 30 tablet 5   • levonorgestrel (MIRENA) 20 MCG/24HR IUD 1 each by Intrauterine route once       • polyethylene glycol-electrolytes (NULYTELY) 4000 mL solution TAKE AS DIRECTED ON INSTRUCTION SHEET     • LORazepam (ATIVAN) 1 mg tablet Take 1 mg by mouth 2 (two) times a day as needed (Patient not taking: Reported on 9/6/2022)       No current facility-administered medications for this visit  Social History:  Social History     Socioeconomic History   • Marital status:      Spouse name: None   • Number of children: None   • Years of education: None   • Highest education level: None   Occupational History   • None   Tobacco Use   • Smoking status: Never   • Smokeless tobacco: Never   Vaping Use   • Vaping Use: Never used   Substance and Sexual Activity   • Alcohol use: Yes     Alcohol/week: 3 0 standard drinks     Types: 3 Glasses of wine per week     Comment: social   • Drug use: No   • Sexual activity: Yes     Partners: Male     Birth control/protection: I U D     Other Topics Concern   • None   Social History Narrative   • None     Social Determinants of Health     Financial Resource Strain: Low Risk    • Difficulty of Paying Living Expenses: Not hard at all   Food Insecurity: No Food Insecurity   • Worried About [address] in the Last Year: Never true   • Ran Out of Food in the Last Year: Never true   Transportation Needs: No Transportation Needs   • Lack of Transportation (Medical): No   • Lack of Transportation (Non-Medical): No   Physical Activity: Sufficiently Active   • Days of Exercise per Week: 3 days   • Minutes of Exercise per Session: 60 min   Stress: No Stress Concern Present   • Feeling of Stress : Not at all   Social Connections: Not on file   Intimate Partner Violence: Not At Risk   • Fear of Current or Ex-Partner: No   • Emotionally Abused: No   • Physically Abused: No   • Sexually Abused: No   Housing Stability: Low Risk    • Unable to Pay for Housing in the Last Year: No   • Number of Places Lived in the Last Year: 1   • Unstable Housing in the Last Year: No          Review of Systems   Constitutional: Negative for appetite change, chills and fever  HENT: Negative for sore throat  Respiratory: Negative for cough and shortness of breath  Cardiovascular: Negative for chest pain and leg swelling  Gastrointestinal: Negative for diarrhea, nausea and vomiting  Musculoskeletal: Negative for joint swelling  Skin: Negative for rash and wound  Allergic/Immunologic: Negative for immunocompromised state  Neurological: Negative for weakness and numbness  Hematological: Negative  Psychiatric/Behavioral: Negative for behavioral problems and confusion  Objective:      /76   Pulse 98   Ht 5' 1" (1 549 m) Comment: verbal  Wt 55 3 kg (122 lb)   BMI 23 05 kg/m²          Physical Exam  Vitals reviewed  Constitutional:       General: She is not in acute distress  Appearance: Normal appearance  She is not ill-appearing  HENT:      Head: Normocephalic and atraumatic  Eyes:      Extraocular Movements: Extraocular movements intact  Cardiovascular:      Rate and Rhythm: Normal rate and regular rhythm  Pulses: Normal pulses             Dorsalis pedis pulses are 2+ on the right side and 2+ on the left side  Posterior tibial pulses are 2+ on the right side and 2+ on the left side  Pulmonary:      Effort: Pulmonary effort is normal  No respiratory distress  Musculoskeletal:         General: Tenderness and deformity present  No swelling or signs of injury  Cervical back: Normal range of motion and neck supple  Right lower leg: No edema  Left lower leg: No edema  Right foot: No foot drop  Left foot: No foot drop  Comments: Bunion / HAV presents, left worse than right  Abduction of great toes with HPK at IPJ  Adductovarus 5th toe, right worse than left  No hypermobility at 1st ray bilaterally  Skin:     General: Skin is warm  Capillary Refill: Capillary refill takes less than 2 seconds  Coloration: Skin is not cyanotic or mottled  Findings: No abscess, ecchymosis, erythema or wound  Nails: There is no clubbing  Neurological:      General: No focal deficit present  Mental Status: She is alert and oriented to person, place, and time  Cranial Nerves: No cranial nerve deficit  Sensory: No sensory deficit  Motor: No weakness  Coordination: Coordination normal    Psychiatric:         Mood and Affect: Mood normal          Behavior: Behavior normal          Thought Content:  Thought content normal          Judgment: Judgment normal

## 2023-01-27 ENCOUNTER — APPOINTMENT (OUTPATIENT)
Dept: LAB | Facility: HOSPITAL | Age: 53
End: 2023-01-27
Attending: PODIATRIST

## 2023-01-27 DIAGNOSIS — Z01.818 PRE-OP TESTING: ICD-10-CM

## 2023-01-27 LAB
ALBUMIN SERPL BCP-MCNC: 4.2 G/DL (ref 3.5–5)
ALP SERPL-CCNC: 74 U/L (ref 43–122)
ALT SERPL W P-5'-P-CCNC: 28 U/L
ANION GAP SERPL CALCULATED.3IONS-SCNC: 6 MMOL/L (ref 5–14)
AST SERPL W P-5'-P-CCNC: 33 U/L (ref 14–36)
BASOPHILS # BLD AUTO: 0.02 THOUSANDS/ÂΜL (ref 0–0.1)
BASOPHILS NFR BLD AUTO: 0 % (ref 0–1)
BILIRUB SERPL-MCNC: 0.49 MG/DL (ref 0.2–1)
BUN SERPL-MCNC: 14 MG/DL (ref 5–25)
CALCIUM SERPL-MCNC: 9.3 MG/DL (ref 8.4–10.2)
CHLORIDE SERPL-SCNC: 106 MMOL/L (ref 96–108)
CO2 SERPL-SCNC: 28 MMOL/L (ref 21–32)
CREAT SERPL-MCNC: 0.65 MG/DL (ref 0.6–1.2)
EOSINOPHIL # BLD AUTO: 0.11 THOUSAND/ÂΜL (ref 0–0.61)
EOSINOPHIL NFR BLD AUTO: 3 % (ref 0–6)
ERYTHROCYTE [DISTWIDTH] IN BLOOD BY AUTOMATED COUNT: 12.5 % (ref 11.6–15.1)
EST. AVERAGE GLUCOSE BLD GHB EST-MCNC: 123 MG/DL
GFR SERPL CREATININE-BSD FRML MDRD: 102 ML/MIN/1.73SQ M
GLUCOSE P FAST SERPL-MCNC: 94 MG/DL (ref 70–99)
HBA1C MFR BLD: 5.9 %
HCT VFR BLD AUTO: 37.3 % (ref 34.8–46.1)
HGB BLD-MCNC: 12.4 G/DL (ref 11.5–15.4)
IMM GRANULOCYTES # BLD AUTO: 0.01 THOUSAND/UL (ref 0–0.2)
IMM GRANULOCYTES NFR BLD AUTO: 0 % (ref 0–2)
LYMPHOCYTES # BLD AUTO: 2.05 THOUSANDS/ÂΜL (ref 0.6–4.47)
LYMPHOCYTES NFR BLD AUTO: 46 % (ref 14–44)
MCH RBC QN AUTO: 30.6 PG (ref 26.8–34.3)
MCHC RBC AUTO-ENTMCNC: 33.2 G/DL (ref 31.4–37.4)
MCV RBC AUTO: 92 FL (ref 82–98)
MONOCYTES # BLD AUTO: 0.38 THOUSAND/ÂΜL (ref 0.17–1.22)
MONOCYTES NFR BLD AUTO: 9 % (ref 4–12)
NEUTROPHILS # BLD AUTO: 1.89 THOUSANDS/ÂΜL (ref 1.85–7.62)
NEUTS SEG NFR BLD AUTO: 42 % (ref 43–75)
NRBC BLD AUTO-RTO: 0 /100 WBCS
PLATELET # BLD AUTO: 207 THOUSANDS/UL (ref 149–390)
PMV BLD AUTO: 10.2 FL (ref 8.9–12.7)
POTASSIUM SERPL-SCNC: 3.8 MMOL/L (ref 3.5–5.3)
PROT SERPL-MCNC: 8.2 G/DL (ref 6.4–8.4)
RBC # BLD AUTO: 4.05 MILLION/UL (ref 3.81–5.12)
SODIUM SERPL-SCNC: 140 MMOL/L (ref 135–147)
WBC # BLD AUTO: 4.46 THOUSAND/UL (ref 4.31–10.16)

## 2023-01-30 LAB
ATRIAL RATE: 50 BPM
P AXIS: 73 DEGREES
PR INTERVAL: 180 MS
QRS AXIS: 78 DEGREES
QRSD INTERVAL: 78 MS
QT INTERVAL: 450 MS
QTC INTERVAL: 410 MS
T WAVE AXIS: 56 DEGREES
VENTRICULAR RATE: 50 BPM

## 2023-02-02 ENCOUNTER — OFFICE VISIT (OUTPATIENT)
Dept: FAMILY MEDICINE CLINIC | Facility: CLINIC | Age: 53
End: 2023-02-02

## 2023-02-02 VITALS
HEIGHT: 61 IN | DIASTOLIC BLOOD PRESSURE: 66 MMHG | BODY MASS INDEX: 22.58 KG/M2 | WEIGHT: 119.6 LBS | HEART RATE: 66 BPM | SYSTOLIC BLOOD PRESSURE: 118 MMHG | OXYGEN SATURATION: 97 % | TEMPERATURE: 97.7 F

## 2023-02-02 DIAGNOSIS — Z01.818 PREOPERATIVE CLEARANCE: Primary | ICD-10-CM

## 2023-02-02 NOTE — LETTER
February 3, 2023     Jaleesa Salter, [address] [facility] [address]    Patient: Chase Brar   YOB: 1970   Date of Visit: 2023       Dear Dr Vamshi Max:    Thank you for referring Chase Brar to me for evaluation  Below are my notes for this consultation  If you have questions, please do not hesitate to call me  I look forward to following your patient along with you  Sincerely,        Linwood Fajardo MD        CC: No Recipients  Linwood Fajardo MD  2/3/2023 12:55 PM  Incomplete  [facility] PRE-OPERATIVE EVALUATION  [address] PRIMARY CARE    NAME: Chase Brar  AGE: 46 y o  SEX: female  : 1970     DATE: 2/3/2023    [facility] Pre-Operative Evaluation      Chief Complaint: Pre-operative Evaluation  Surgery: 2023  Anticipated Date of Surgery: Bunionectomy   Referring Provider: Rosana Robles MD       History of Present Illness: Chase Brar is a 46 y o  female who presents to the office today for a preoperative consultation at the request of surgeon, Dr Vamshi Max, who plans on performing bunionectomy on 2023  Patient has a bleeding risk of: no recent abnormal bleeding  Patient does not have objections to receiving blood products if needed  Current anti-platelet/anti-coagulation medications that the patient is prescribed includes: None        Assessment of Chronic Conditions:   - Hypertension: Well controlled; continue Amlodipine 5mg daily      Assessment of Cardiac Risk:  · Denies unstable or severe angina or MI in the last 6 weeks or history of stent placement in the last year   · Denies decompensated heart failure (e g  New onset heart failure, NYHA functional class IV heart failure, or worsening existing heart failure)  · Denies significant arrhythmias such as high grade AV block, symptomatic ventricular arrhythmia, newly recognized ventricular tachycardia, supraventricular tachycardia with resting heart rate >100, or symptomatic bradycardia  · Denies severe heart valve disease including aortic stenosis or symptomatic mitral stenosis     Exercise Capacity:  · Able to walk 4 blocks without symptoms?: Yes  · Able to walk 2 flights without symptoms?: Yes    Prior Anesthesia Reactions: No     Personal history of venous thromboembolic disease? No    History of steroid use for >2 weeks within last year? No         Review of Systems:     Review of Systems   Constitutional: Negative  HENT: Positive for congestion  Eyes: Negative  Respiratory: Negative  Cardiovascular: Negative  Gastrointestinal: Negative  Genitourinary: Negative  Musculoskeletal:        Left foot pain   Skin: Negative  Neurological: Negative  Psychiatric/Behavioral: Negative  Current Problem List:     Patient Active Problem List   Diagnosis   • Monoclonal gammopathy   • Iron deficiency anemia, unspecified   • Pure hypercholesterolemia   • Essential hypertension   • Allergies   • Chronic nasal congestion   • Chronic diarrhea   • Bradycardia   • Insomnia   • Disease of pericardium, unspecified   • VU (obstructive sleep apnea)   • Restless leg syndrome   • Enlarged lymph node   • Positive GONZALO (antinuclear antibody)   • Chronic left shoulder pain   • Vaginal bleeding   • Microscopic hematuria   • Low thyroid stimulating hormone (TSH) level   • Nonrheumatic aortic valve insufficiency   • Hammer toe of right foot   • Corn   • Urge incontinence       Allergies:      Allergies   Allergen Reactions   • Atorvastatin GI Intolerance   • Crestor [Rosuvastatin]      Upset stomach and diet       Current Medications:       Current Outpatient Medications:   •  amLODIPine (NORVASC) 5 mg tablet, Take 1 tablet (5 mg total) by mouth daily, Disp: 30 tablet, Rfl: 5  •  levonorgestrel (MIRENA) 20 MCG/24HR IUD, 1 each by Intrauterine route once  , Disp: , Rfl:     Past Medical History:       Past Medical History:   Diagnosis Date   • Anemia     iron deficiency anemia   • Anxiety    • Bradycardia    • Bradycardia    • Chronic diarrhea    • Chronic left shoulder pain    • Chronic nasal congestion    • Depression    • Disease of pericardium    • Elevated LFTs    • Excessive daytime sleepiness    • Hammer toe of right foot    • Hemorrhoids    • Hypercholesterolemia    • Hypertension    • Hypotension    • Insomnia    • Leukopenia    • Low back pain    • Monoclonal gammopathy    • Nonrheumatic aortic (valve) insufficiency    • Restless leg syndrome    • Sleep apnea    • Urinary frequency         Past Surgical History:   Procedure Laterality Date   • BREAST RECONSTRUCTION      "breast lift" June 2020   • HEMORROIDECTOMY     • WISDOM TOOTH EXTRACTION     • WRIST SURGERY Right         Family History   Problem Relation Age of Onset   • Hypertension Maternal Grandmother    • Diabetes Paternal Grandmother    • No Known Problems Mother    • No Known Problems Father    • No Known Problems Sister    • No Known Problems Daughter    • No Known Problems Maternal Grandfather    • No Known Problems Paternal Grandfather    • No Known Problems Sister    • No Known Problems Son    • No Known Problems Son    • No Known Problems Son    • No Known Problems Son         Social History     Socioeconomic History   • Marital status:      Spouse name: Not on file   • Number of children: Not on file   • Years of education: Not on file   • Highest education level: Not on file   Occupational History   • Not on file   Tobacco Use   • Smoking status: Never   • Smokeless tobacco: Never   Vaping Use   • Vaping Use: Never used   Substance and Sexual Activity   • Alcohol use: Yes     Alcohol/week: 3 0 standard drinks     Types: 3 Glasses of wine per week     Comment: social   • Drug use: No   • Sexual activity: Yes     Partners: Male     Birth control/protection: I U D     Other Topics Concern   • Not on file   Social History Narrative   • Not on file     Social Determinants of Health     Financial Resource Strain: Low Risk    • Difficulty of Paying Living Expenses: Not hard at all   Food Insecurity: No Food Insecurity   • Worried About Running Out of Food in the Last Year: Never true   • Ran Out of Food in the Last Year: Never true   Transportation Needs: No Transportation Needs   • Lack of Transportation (Medical): No   • Lack of Transportation (Non-Medical): No   Physical Activity: Sufficiently Active   • Days of Exercise per Week: 3 days   • Minutes of Exercise per Session: 60 min   Stress: No Stress Concern Present   • Feeling of Stress : Not at all   Social Connections: Not on file   Intimate Partner Violence: Not At Risk   • Fear of Current or Ex-Partner: No   • Emotionally Abused: No   • Physically Abused: No   • Sexually Abused: No   Housing Stability: Low Risk    • Unable to Pay for Housing in the Last Year: No   • Number of Places Lived in the Last Year: 1   • Unstable Housing in the Last Year: No        Physical Exam:     /66 (BP Location: Left arm, Patient Position: Sitting, Cuff Size: Standard)   Pulse 66   Temp 97 7 °F (36 5 °C) (Temporal)   Ht 5' 1" (1 549 m)   Wt 54 3 kg (119 lb 9 6 oz)   SpO2 97%   BMI 22 60 kg/m²     Physical Exam  Constitutional:       General: She is not in acute distress  Appearance: She is not ill-appearing  HENT:      Head: Normocephalic and atraumatic  Mouth/Throat:      Mouth: Mucous membranes are moist       Pharynx: No oropharyngeal exudate or posterior oropharyngeal erythema  Eyes:      General:         Right eye: No discharge  Left eye: No discharge  Extraocular Movements: Extraocular movements intact  Pupils: Pupils are equal, round, and reactive to light  Cardiovascular:      Rate and Rhythm: Normal rate  Pulses: Normal pulses  Pulmonary:      Effort: Pulmonary effort is normal  No respiratory distress  Breath sounds: No wheezing  Abdominal:      General: Bowel sounds are normal  There is no distension  Palpations: Abdomen is soft  Tenderness: There is no abdominal tenderness  There is no guarding  Musculoskeletal:      Cervical back: Normal range of motion  Right lower leg: No edema  Left lower leg: No edema  Lymphadenopathy:      Cervical: No cervical adenopathy  Neurological:      General: No focal deficit present  Mental Status: She is alert  Psychiatric:         Mood and Affect: Mood normal          Behavior: Behavior normal           Data:     Pre-operative work-up    Laboratory Results: I have personally reviewed the pertinent laboratory results/reports      EKG: I have personally reviewed pertinent reports  Previous cardiopulmonary studies within the past year:  · Echocardiogram (8/31/2022): Left ventricular cavity size is normal  Wall thickness is normal  The left ventricular ejection fraction is 65%  Systolic function is normal  Wall motion is normal  Diastolic function is normal         Assessment & Recommendations:     1  Preoperative clearance            Pre-Op Evaluation Assessment  46 y o  female with planned surgery: Bunionectomy  Known risk factors for perioperative complications: Hypertension  Current medications which may produce withdrawal symptoms if withheld perioperatively: None  Pre-Op Evaluation Plan  1  Further preoperative workup as follows:   - None; no further preoperative work-up is required    2  Medication Management/Recommendations:   - Patient has been instructed to avoid herbs or non-directed vitamins the week prior to surgery to ensure no drug interactions with perioperative surgical and anesthetic medications  - Patient should continue antihypertensive medications up through and including the day of surgery  3  Prophylaxis for cardiac events with perioperative beta-blockers: not indicated      4  Patient requires further consultation with: None    Clearance  Patient is medically optimized for surgery without any additional cardiac testing  Pantera Salvador MD  [address] [place] [address]  [address]  [address] [address]  Phone#  [phone]  Fax#  [phone]    Pantera Salvador MD  2023  4:22 PM  Sign when Signing Visit  [address] [place] [address] PRIMARY CARE    NAME: Lisa Moyer  AGE: 46 y o  SEX: female  : 1970     DATE: 2023    [facility] Pre-Operative Evaluation      Chief Complaint: Pre-operative Evaluation     Surgery: ***  Anticipated Date of Surgery: ***  Referring Provider: Beny Mendiola MD       History of Present Illness: Lisa Moyer is a 46 y o  female who presents to the office today for a preoperative consultation at the request of surgeon, ***, who plans on performing *** on ***  Planned anesthesia is {anesthesia type:812}  Patient has a bleeding risk of: {bleeding risk:94521}  Patient {does/does not:78838} have objections to receiving blood products if needed  Current anti-platelet/anti-coagulation medications that the patient is prescribed includes: {preop;antiplatelet,anticoa}   ***     Assessment of Chronic Conditions:   {pre-op; chronic conditions:27997}     Assessment of Cardiac Risk:  · {Actions; denies-reports:23914} unstable or severe angina or MI in the last 6 weeks or history of stent placement in the last year   · {Actions; denies-reports:95368} decompensated heart failure (e g  New onset heart failure, NYHA functional class IV heart failure, or worsening existing heart failure)  · {Actions; denies-reports:75242} significant arrhythmias such as high grade AV block, symptomatic ventricular arrhythmia, newly recognized ventricular tachycardia, supraventricular tachycardia with resting heart rate >100, or symptomatic bradycardia  · {Actions; denies-reports:52021} severe heart valve disease including aortic stenosis or symptomatic mitral stenosis     Exercise Capacity:  · Able to walk 4 blocks without symptoms?: {YES /NO:79441}  · Able to walk 2 flights without symptoms?: {YES /FP:04383}    Prior Anesthesia Reactions: {YES /SL:68726}     Personal history of venous thromboembolic disease? {YES /KLEBER:94246}    History of steroid use for >2 weeks within last year? {YES /DA:52626}         Review of Systems:     Review of Systems    Current Problem List:     Patient Active Problem List   Diagnosis   • Monoclonal gammopathy   • Iron deficiency anemia, unspecified   • Abnormal EKG   • Pure hypercholesterolemia   • Essential hypertension   • GONZALO positive   • Low iron   • Anemia   • Leukopenia   • Allergies   • Chronic nasal congestion   • Chronic diarrhea   • Encounter for screening for HIV   • Bradycardia   • Screening for colon cancer   • Insomnia   • Immunization not carried out because of patient decision   • Disease of pericardium, unspecified   • Urinary urgency   • Urinary frequency   • VU (obstructive sleep apnea)   • Restless leg syndrome   • Excessive daytime sleepiness   • Abnormal urine finding   • Encounter for screening mammogram for malignant neoplasm of breast   • Enlarged lymph node   • Positive GONZALO (antinuclear antibody)   • Pain of left thigh   • Acute bilateral low back pain without sciatica   • Elevated liver function tests   • Abnormal CK   • Chronic left shoulder pain   • Vaginal bleeding   • Microscopic hematuria   • Menstrual period late   • Immunization counseling   • Low thyroid stimulating hormone (TSH) level   • Nonrheumatic aortic valve insufficiency   • Hammer toe of right foot   • Corn   • Abnormal renal function   • Urge incontinence       Allergies:      Allergies   Allergen Reactions   • Atorvastatin GI Intolerance   • Crestor [Rosuvastatin]      Upset stomach and diet       Current Medications:       Current Outpatient Medications:   •  amLODIPine (NORVASC) 5 mg tablet, Take 1 tablet (5 mg total) by mouth daily, Disp: 30 tablet, Rfl: 5  •  levonorgestrel (MIRENA) 20 MCG/24HR IUD, 1 each by Intrauterine route once  , Disp: , Rfl:     Past Medical History:       Past Medical History:   Diagnosis Date   • Anemia     iron deficiency anemia   • Anxiety    • Bradycardia    • Bradycardia    • Chronic diarrhea    • Chronic left shoulder pain    • Chronic nasal congestion    • Depression    • Disease of pericardium    • Elevated LFTs    • Excessive daytime sleepiness    • Hammer toe of right foot    • Hemorrhoids    • Hypercholesterolemia    • Hypertension    • Hypotension    • Insomnia    • Leukopenia    • Low back pain    • Monoclonal gammopathy    • Nonrheumatic aortic (valve) insufficiency    • Restless leg syndrome    • Sleep apnea    • Urinary frequency         Past Surgical History:   Procedure Laterality Date   • BREAST RECONSTRUCTION      "breast lift" June 2020   • HEMORROIDECTOMY     • WISDOM TOOTH EXTRACTION     • WRIST SURGERY Right         Family History   Problem Relation Age of Onset   • Hypertension Maternal Grandmother    • Diabetes Paternal Grandmother    • No Known Problems Mother    • No Known Problems Father    • No Known Problems Sister    • No Known Problems Daughter    • No Known Problems Maternal Grandfather    • No Known Problems Paternal Grandfather    • No Known Problems Sister    • No Known Problems Son    • No Known Problems Son    • No Known Problems Son    • No Known Problems Son         Social History     Socioeconomic History   • Marital status:      Spouse name: Not on file   • Number of children: Not on file   • Years of education: Not on file   • Highest education level: Not on file   Occupational History   • Not on file   Tobacco Use   • Smoking status: Never   • Smokeless tobacco: Never   Vaping Use   • Vaping Use: Never used   Substance and Sexual Activity   • Alcohol use:  Yes     Alcohol/week: 3 0 standard drinks     Types: 3 Glasses of wine per week     Comment: social   • Drug use: No   • Sexual activity: Yes     Partners: Male     Birth control/protection: I U D  Other Topics Concern   • Not on file   Social History Narrative   • Not on file     Social Determinants of Health     Financial Resource Strain: Low Risk    • Difficulty of Paying Living Expenses: Not hard at all   Food Insecurity: No Food Insecurity   • Worried About [address] in the Last Year: Never true   • Ran Out of Food in the Last Year: Never true   Transportation Needs: No Transportation Needs   • Lack of Transportation (Medical): No   • Lack of Transportation (Non-Medical): No   Physical Activity: Sufficiently Active   • Days of Exercise per Week: 3 days   • Minutes of Exercise per Session: 60 min   Stress: No Stress Concern Present   • Feeling of Stress : Not at all   Social Connections: Not on file   Intimate Partner Violence: Not At Risk   • Fear of Current or Ex-Partner: No   • Emotionally Abused: No   • Physically Abused: No   • Sexually Abused: No   Housing Stability: Low Risk    • Unable to Pay for Housing in the Last Year: No   • Number of Places Lived in the Last Year: 1   • Unstable Housing in the Last Year: No        Physical Exam:     /66 (BP Location: Left arm, Patient Position: Sitting, Cuff Size: Standard)   Pulse 66   Temp 97 7 °F (36 5 °C) (Temporal)   Ht 5' 1" (1 549 m)   Wt 54 3 kg (119 lb 9 6 oz)   SpO2 97%   BMI 22 60 kg/m²     Physical Exam     Data:     Pre-operative work-up    Laboratory Results: {Labs reviewed:60179}     EKG: {Results Review Statement:19778}    Chest x-ray: {Results Review Statement:18133}      Previous cardiopulmonary studies within the past year:  · Echocardiogram: ***  · Cardiac Catheterization: ***  · Stress Test: ***  · Pulmonary Function Testing: ***      Assessment & Recommendations:     No diagnosis found  Pre-Op Evaluation Assessment  46 y o  female with planned surgery: ***  Known risk factors for perioperative complications: {preop STOJ:09060::"BZXX"}          Current medications which may produce withdrawal symptoms if withheld perioperatively: ***  Pre-Op Evaluation Plan  1  Further preoperative workup as follows:   {PREOP WORKUP:90342}    2  Medication Management/Recommendations:   {preop meds:49981}    3  Prophylaxis for cardiac events with perioperative beta-blockers: {not indicated/consider:78030}      4  Patient requires further consultation with: {Referrals; pre-op:23097}    Clearance  {Surgical Clearance:74615}     Andreina Zayas MD  [facility] PRIMARY CARE  [address] [place] [id number]  Phone#  [phone]  Fax#  [phone]

## 2023-02-02 NOTE — PROGRESS NOTES
[facility] PRE-OPERATIVE EVALUATION  [facility]IC[facility] - [facility]    NAME: Huber Calabrese  AGE: 46 y o  SEX: female  : 1970     DATE: 2/3/2023    Family Practice Pre-Operative Evaluation      Chief Complaint: Pre-operative Evaluation  Surgery: 2023  Anticipated Date of Surgery: Bunionectomy left foot  Referring Provider: Ba Castellanos MD       History of Present Illness: Huber Calabrese is a 46 y o  female who presents to the office today for a preoperative consultation at the request of surgeon, Dr Pedro Luis Yu, who plans on performing bunionectomy on 2023  Patient has a bleeding risk of: no recent abnormal bleeding  Patient does not have objections to receiving blood products if needed  Current anti-platelet/anti-coagulation medications that the patient is prescribed includes: None  Assessment of Chronic Conditions:   - Hypertension: Well controlled; continue Amlodipine 5mg daily      Assessment of Cardiac Risk:  · Denies unstable or severe angina or MI in the last 6 weeks or history of stent placement in the last year   · Denies decompensated heart failure (e g  New onset heart failure, NYHA functional class IV heart failure, or worsening existing heart failure)  · Denies significant arrhythmias such as high grade AV block, symptomatic ventricular arrhythmia, newly recognized ventricular tachycardia, supraventricular tachycardia with resting heart rate >100, or symptomatic bradycardia  · Denies severe heart valve disease including aortic stenosis or symptomatic mitral stenosis     Exercise Capacity:  · Able to walk 4 blocks without symptoms?: Yes  · Able to walk 2 flights without symptoms?: Yes    Prior Anesthesia Reactions: No     Personal history of venous thromboembolic disease? No    History of steroid use for >2 weeks within last year? No         Review of Systems:     Review of Systems   Constitutional: Negative  HENT: Positive for congestion      Eyes: Negative  Respiratory: Negative  Cardiovascular: Negative  Gastrointestinal: Negative  Genitourinary: Negative  Musculoskeletal:        Left foot pain   Skin: Negative  Neurological: Negative  Psychiatric/Behavioral: Negative  Current Problem List:     Patient Active Problem List   Diagnosis   • Monoclonal gammopathy   • Iron deficiency anemia, unspecified   • Pure hypercholesterolemia   • Essential hypertension   • Allergies   • Chronic nasal congestion   • Chronic diarrhea   • Bradycardia   • Insomnia   • Disease of pericardium, unspecified   • VU (obstructive sleep apnea)   • Restless leg syndrome   • Enlarged lymph node   • Positive GONZALO (antinuclear antibody)   • Chronic left shoulder pain   • Vaginal bleeding   • Microscopic hematuria   • Low thyroid stimulating hormone (TSH) level   • Nonrheumatic aortic valve insufficiency   • Hammer toe of right foot   • Corn   • Urge incontinence       Allergies:      Allergies   Allergen Reactions   • Atorvastatin GI Intolerance   • Crestor [Rosuvastatin]      Upset stomach and diet       Current Medications:       Current Outpatient Medications:   •  amLODIPine (NORVASC) 5 mg tablet, Take 1 tablet (5 mg total) by mouth daily, Disp: 30 tablet, Rfl: 5  •  levonorgestrel (MIRENA) 20 MCG/24HR IUD, 1 each by Intrauterine route once  , Disp: , Rfl:     Past Medical History:       Past Medical History:   Diagnosis Date   • Anemia     iron deficiency anemia   • Anxiety    • Bradycardia    • Bradycardia    • Chronic diarrhea    • Chronic left shoulder pain    • Chronic nasal congestion    • Depression    • Disease of pericardium    • Elevated LFTs    • Excessive daytime sleepiness    • Hammer toe of right foot    • Hemorrhoids    • Hypercholesterolemia    • Hypertension    • Hypotension    • Insomnia    • Leukopenia    • Low back pain    • Monoclonal gammopathy    • Nonrheumatic aortic (valve) insufficiency    • Restless leg syndrome    • Sleep apnea • Urinary frequency         Past Surgical History:   Procedure Laterality Date   • BREAST RECONSTRUCTION      "breast lift" June 2020   • HEMORROIDECTOMY     • WISDOM TOOTH EXTRACTION     • WRIST SURGERY Right         Family History   Problem Relation Age of Onset   • Hypertension Maternal Grandmother    • Diabetes Paternal Grandmother    • No Known Problems Mother    • No Known Problems Father    • No Known Problems Sister    • No Known Problems Daughter    • No Known Problems Maternal Grandfather    • No Known Problems Paternal Grandfather    • No Known Problems Sister    • No Known Problems Son    • No Known Problems Son    • No Known Problems Son    • No Known Problems Son         Social History     Socioeconomic History   • Marital status:      Spouse name: Not on file   • Number of children: Not on file   • Years of education: Not on file   • Highest education level: Not on file   Occupational History   • Not on file   Tobacco Use   • Smoking status: Never   • Smokeless tobacco: Never   Vaping Use   • Vaping Use: Never used   Substance and Sexual Activity   • Alcohol use: Yes     Alcohol/week: 3 0 standard drinks     Types: 3 Glasses of wine per week     Comment: social   • Drug use: No   • Sexual activity: Yes     Partners: Male     Birth control/protection: I U D  Other Topics Concern   • Not on file   Social History Narrative   • Not on file     Social Determinants of Health     Financial Resource Strain: Low Risk    • Difficulty of Paying Living Expenses: Not hard at all   Food Insecurity: No Food Insecurity   • Worried About [employer] in the Last Year: Never true   • Ran Out of Food in the Last Year: Never true   Transportation Needs: No Transportation Needs   • Lack of Transportation (Medical): No   • Lack of Transportation (Non-Medical):  No   Physical Activity: Sufficiently Active   • Days of Exercise per Week: 3 days   • Minutes of Exercise per Session: 60 min   Stress: No Stress Concern Present   • Feeling of Stress : Not at all   Social Connections: Not on file   Intimate Partner Violence: Not At Risk   • Fear of Current or Ex-Partner: No   • Emotionally Abused: No   • Physically Abused: No   • Sexually Abused: No   Housing Stability: Low Risk    • Unable to Pay for Housing in the Last Year: No   • Number of Places Lived in the Last Year: 1   • Unstable Housing in the Last Year: No        Physical Exam:     /66 (BP Location: Left arm, Patient Position: Sitting, Cuff Size: Standard)   Pulse 66   Temp 97 7 °F (36 5 °C) (Temporal)   Ht 5' 1" (1 549 m)   Wt 54 3 kg (119 lb 9 6 oz)   SpO2 97%   BMI 22 60 kg/m²     Physical Exam  Constitutional:       General: She is not in acute distress  Appearance: She is not ill-appearing  HENT:      Head: Normocephalic and atraumatic  Mouth/Throat:      Mouth: Mucous membranes are moist       Pharynx: No oropharyngeal exudate or posterior oropharyngeal erythema  Eyes:      General:         Right eye: No discharge  Left eye: No discharge  Extraocular Movements: Extraocular movements intact  Pupils: Pupils are equal, round, and reactive to light  Cardiovascular:      Rate and Rhythm: Normal rate  Pulses: Normal pulses  Pulmonary:      Effort: Pulmonary effort is normal  No respiratory distress  Breath sounds: No wheezing  Abdominal:      General: Bowel sounds are normal  There is no distension  Palpations: Abdomen is soft  Tenderness: There is no abdominal tenderness  There is no guarding  Musculoskeletal:      Cervical back: Normal range of motion  Right lower leg: No edema  Left lower leg: No edema  Lymphadenopathy:      Cervical: No cervical adenopathy  Neurological:      General: No focal deficit present  Mental Status: She is alert     Psychiatric:         Mood and Affect: Mood normal          Behavior: Behavior normal           Data:     Pre-operative work-up    Laboratory Results: I have personally reviewed the pertinent laboratory results/reports      EKG: I have personally reviewed pertinent reports  Previous cardiopulmonary studies within the past year:  · Echocardiogram (8/31/2022): Left ventricular cavity size is normal  Wall thickness is normal  The left ventricular ejection fraction is 65%  Systolic function is normal  Wall motion is normal  Diastolic function is normal         Assessment & Recommendations:     1  Preoperative clearance            Pre-Op Evaluation Assessment  46 y o  female with planned surgery: Bunionectomy  Known risk factors for perioperative complications: Hypertension  Current medications which may produce withdrawal symptoms if withheld perioperatively: None  Pre-Op Evaluation Plan  1  Further preoperative workup as follows:   - None; no further preoperative work-up is required    2  Medication Management/Recommendations:   - Patient has been instructed to avoid herbs or non-directed vitamins the week prior to surgery to ensure no drug interactions with perioperative surgical and anesthetic medications  - Patient should continue antihypertensive medications up through and including the day of surgery  3  Prophylaxis for cardiac events with perioperative beta-blockers: not indicated  4  Patient requires further consultation with: None    Clearance  Patient is medically optimized for surgery without any additional cardiac testing       Yary Carlos MD  [address] [place] [address]  [employer] [employer]   [address] [address]  Phone#  [phone]  Fax#  [phone]

## 2023-02-03 PROBLEM — E61.1 LOW IRON: Status: RESOLVED | Noted: 2020-03-19 | Resolved: 2023-02-03

## 2023-02-03 PROBLEM — M54.50 ACUTE BILATERAL LOW BACK PAIN WITHOUT SCIATICA: Status: RESOLVED | Noted: 2021-10-14 | Resolved: 2023-02-03

## 2023-02-03 PROBLEM — Z28.20 IMMUNIZATION NOT CARRIED OUT BECAUSE OF PATIENT DECISION: Status: RESOLVED | Noted: 2020-11-11 | Resolved: 2023-02-03

## 2023-02-03 PROBLEM — R94.31 ABNORMAL EKG: Status: RESOLVED | Noted: 2020-03-19 | Resolved: 2023-02-03

## 2023-02-03 PROBLEM — Z11.4 ENCOUNTER FOR SCREENING FOR HIV: Status: RESOLVED | Noted: 2020-06-03 | Resolved: 2023-02-03

## 2023-02-03 PROBLEM — R76.8 ANA POSITIVE: Status: RESOLVED | Noted: 2020-03-19 | Resolved: 2023-02-03

## 2023-02-03 PROBLEM — R74.8 ABNORMAL CK: Status: RESOLVED | Noted: 2021-10-14 | Resolved: 2023-02-03

## 2023-02-03 PROBLEM — R79.89 ELEVATED LIVER FUNCTION TESTS: Status: RESOLVED | Noted: 2021-10-14 | Resolved: 2023-02-03

## 2023-02-03 PROBLEM — N92.6 MENSTRUAL PERIOD LATE: Status: RESOLVED | Noted: 2022-07-12 | Resolved: 2023-02-03

## 2023-02-03 PROBLEM — D72.819 LEUKOPENIA: Status: RESOLVED | Noted: 2020-03-19 | Resolved: 2023-02-03

## 2023-02-03 PROBLEM — Z12.31 ENCOUNTER FOR SCREENING MAMMOGRAM FOR MALIGNANT NEOPLASM OF BREAST: Status: RESOLVED | Noted: 2021-10-14 | Resolved: 2023-02-03

## 2023-02-03 PROBLEM — Z71.85 IMMUNIZATION COUNSELING: Status: RESOLVED | Noted: 2022-07-12 | Resolved: 2023-02-03

## 2023-02-03 PROBLEM — M79.652 PAIN OF LEFT THIGH: Status: RESOLVED | Noted: 2021-10-14 | Resolved: 2023-02-03

## 2023-02-03 PROBLEM — R35.0 URINARY FREQUENCY: Status: RESOLVED | Noted: 2020-12-10 | Resolved: 2023-02-03

## 2023-02-03 PROBLEM — R39.15 URINARY URGENCY: Status: RESOLVED | Noted: 2020-12-10 | Resolved: 2023-02-03

## 2023-02-03 PROBLEM — Z12.11 SCREENING FOR COLON CANCER: Status: RESOLVED | Noted: 2020-11-06 | Resolved: 2023-02-03

## 2023-02-03 PROBLEM — R82.90 ABNORMAL URINE FINDING: Status: RESOLVED | Noted: 2021-10-14 | Resolved: 2023-02-03

## 2023-02-03 PROBLEM — N28.9 ABNORMAL RENAL FUNCTION: Status: RESOLVED | Noted: 2022-08-02 | Resolved: 2023-02-03

## 2023-02-03 PROBLEM — D64.9 ANEMIA: Status: RESOLVED | Noted: 2020-03-19 | Resolved: 2023-02-03

## 2023-02-06 ENCOUNTER — VBI (OUTPATIENT)
Dept: ADMINISTRATIVE | Facility: OTHER | Age: 53
End: 2023-02-06

## 2023-02-16 ENCOUNTER — ANESTHESIA EVENT (OUTPATIENT)
Dept: PERIOP | Facility: HOSPITAL | Age: 53
End: 2023-02-16

## 2023-02-16 RX ORDER — MULTIVITAMIN
1 TABLET ORAL DAILY
COMMUNITY

## 2023-02-16 NOTE — PRE-PROCEDURE INSTRUCTIONS
Pre-Surgery Instructions:   Medication Instructions   • amLODIPine (NORVASC) 5 mg tablet Take day of surgery  with sip of water    • levonorgestrel (MIRENA) 20 MCG/24HR IUD IUD is in place    • Multiple Vitamin (multivitamin) tablet instructed to hold today 2/16 and tomorrow for surgery   • NON FORMULARY oregano oil - instructed to hold today 2/16 and tomorrow for DOS      Reviewed all medications and instructions for DOS  Reviewed all showering instructions and COVID visitation policy  Pt aware that [facility]  is location for DOS, instructed that pt pre op nurse will call on 2/16/23  to give specific instructions for DOS  Pt instructed to bring photo ID and insurance card for DOS, remove all jewelry and  NO valuables for DOS  Pt instructed to use only Tylenol between now 2/16/23  and DOS, NO NSAID products  Pt informed transport is needed for DOS due to receiving anesthesia  Instructed patient to reduce smoking prior to surgery  No smoking day of surgery  Pt also instructed to avoid alcohol use 24 hrs prior to DOS  Pt verbalized understanding of all instructions given and reviewed for DOS  Pt is NOT vaccinated for COVID   Pt has cleanser and cleansing instructions -reviewed with pt for DOS  Pt instructed to reduce use of marijuana and NO SMOKING DOS   Pt instructed if with any health status change (illness, hospitalization and exposure to COVID) between today and tomorrow - notify surgeon office  My Surgical Experience    The following information was developed to assist you to prepare for your operation  What do I need to do before coming to the hospital?  • Arrange for a responsible person to drive you to and from the hospital   • Arrange care for your children at home  Children are not allowed in the recovery areas of the hospital  • Plan to wear clothing that is easy to put on and take off   If you are having shoulder surgery, wear a shirt that buttons or zippers in the front  Bathing  o Shower the evening before and the morning of your surgery with an antibacterial soap  Please refer to the Pre Op Showering Instructions for Surgery Patients Sheet   o Remove nail polish and all body piercing jewelry  o Do not shave any body part for at least 24 hours before surgery-this includes face, arms, legs and upper body  Food  o Nothing to eat or drink after midnight the night before your surgery  This includes candy and chewing gum  o Exception: If your surgery is after 12:00pm (noon), you may have clear liquids such as 7-Up®, ginger ale, apple or cranberry juice, Jell-O®, water, or clear broth until 8:00 am  o Do not drink milk or juice with pulp on the morning before surgery  o Do not drink alcohol 24 hours before surgery  Medicine  o Follow instructions you received from your surgeon about which medicines you may take on the day of surgery  o If instructed to take medicine on the morning of surgery, take pills with just a small sip of water  Call your prescribing doctor for specific infroamtion on what to do if you take insulin    What should I bring to the hospital?    Bring:  • Crutches or a walker, if you have them, for foot or knee surgery  • A list of the daily medicines, vitamins, minerals, herbals and nutritional supplements you take  Include the dosages of medicines and the time you take them each day  • Glasses, dentures or hearing aids  • Minimal clothing; you will be wearing hospital sleepwear  • Photo ID; required to verify your identity  • If you have a Living Will or Power of , bring a copy of the documents  • If you have an ostomy, bring an extra pouch and any supplies you use    Do not bring  • Medicines or inhalers  • Money, valuables or jewelry    What other information should I know about the day of surgery? • Notify your surgeons if you develop a cold, sore throat, cough, fever, rash or any other illness    • Report to the Ambulatory Surgical/Same Day Surgery Unit  • You will be instructed to stop at Registration only if you have not been pre-registered  • Inform your  fi they do not stay that they will be asked by the staff to leave a phone number where they can be reached  • Be available to be reached before surgery  In the event the operating room schedule changes, you may be asked to come in earlier or later than expected    *It is important to tell your doctor and others involved in your health care if you are taking or have been taking any non-prescription drugs, vitamins, minerals, herbals or other nutritional supplements   Any of these may interact with some food or medicines and cause a reaction

## 2023-02-16 NOTE — ANESTHESIA PREPROCEDURE EVALUATION
Procedure:  BUNIONECTOMY KELTON LEFT FOOT (Left: Foot)  OSTEOTOMY OF PROXIMAL PHALANX LEFT GREAT TOE (Left: Foot)    Relevant Problems   CARDIO   (+) Essential hypertension   (+) Nonrheumatic aortic valve insufficiency   (+) Pure hypercholesterolemia      HEMATOLOGY   (+) Iron deficiency anemia, unspecified      NEURO/PSYCH   (+) Chronic left shoulder pain      PULMONARY   (+) VU (obstructive sleep apnea)      Hypotension Bradycardia   Depression Nonrheumatic aortic (valve) insufficiency   Hammer toe of right foot Chronic left shoulder pain   Low back pain Elevated LFTs   Excessive daytime sleepiness Sleep apnea   Restless leg syndrome Urinary frequency   Insomnia Disease of pericardium   Chronic diarrhea Bradycardia   Hypercholesterolemia Hypertension   Anemia Leukopenia   Chronic nasal congestion Monoclonal gammopathy   Anxiety Hemorrhoids   Prediabetes History of COVID-19       Physical Exam    Airway    Mallampati score: I  TM Distance: >3 FB  Neck ROM: full     Dental   No notable dental hx     Cardiovascular  Cardiovascular exam normal    Pulmonary  Pulmonary exam normal     Other Findings        Anesthesia Plan  ASA Score- 2     Anesthesia Type- IV sedation with anesthesia with ASA Monitors  Additional Monitors:   Airway Plan: LMA  Plan Factors-Exercise tolerance (METS): >4 METS  Chart reviewed  EKG reviewed  Existing labs reviewed  Patient is not a current smoker  Patient not instructed to abstain from smoking on day of procedure  Patient did not smoke on day of surgery  Induction- intravenous  Postoperative Plan-     Informed Consent- Anesthetic plan and risks discussed with patient  I personally reviewed this patient with the CRNA  Discussed and agreed on the Anesthesia Plan with the CRNA  Julianna Rush

## 2023-02-17 ENCOUNTER — HOSPITAL ENCOUNTER (OUTPATIENT)
Facility: HOSPITAL | Age: 53
Setting detail: OUTPATIENT SURGERY
Discharge: HOME | End: 2023-02-17
Attending: PODIATRIST | Admitting: PODIATRIST

## 2023-02-17 ENCOUNTER — ANESTHESIA (OUTPATIENT)
Dept: PERIOP | Facility: HOSPITAL | Age: 53
End: 2023-02-17

## 2023-02-17 ENCOUNTER — APPOINTMENT (OUTPATIENT)
Dept: RADIOLOGY | Facility: HOSPITAL | Age: 53
End: 2023-02-17

## 2023-02-17 VITALS
RESPIRATION RATE: 18 BRPM | HEIGHT: 61 IN | OXYGEN SATURATION: 98 % | TEMPERATURE: 97 F | BODY MASS INDEX: 22.47 KG/M2 | SYSTOLIC BLOOD PRESSURE: 136 MMHG | HEART RATE: 48 BPM | DIASTOLIC BLOOD PRESSURE: 63 MMHG | WEIGHT: 119 LBS

## 2023-02-17 DIAGNOSIS — Z98.890 POST-OPERATIVE STATE: Primary | ICD-10-CM

## 2023-02-17 LAB
EXT PREGNANCY TEST URINE: NEGATIVE
EXT. CONTROL: NORMAL

## 2023-02-17 DEVICE — SCREW, HEADLESS 3.0X16MM_TI GREEN
Type: IMPLANTABLE DEVICE | Status: FUNCTIONAL
Brand: DUAL THREAD SCREW

## 2023-02-17 DEVICE — IMPLANTABLE DEVICE: Type: IMPLANTABLE DEVICE | Site: FOOT | Status: FUNCTIONAL

## 2023-02-17 RX ORDER — PROPOFOL 10 MG/ML
INJECTION, EMULSION INTRAVENOUS CONTINUOUS PRN
Status: DISCONTINUED | OUTPATIENT
Start: 2023-02-17 | End: 2023-02-17

## 2023-02-17 RX ORDER — SODIUM CHLORIDE, SODIUM LACTATE, POTASSIUM CHLORIDE, CALCIUM CHLORIDE 600; 310; 30; 20 MG/100ML; MG/100ML; MG/100ML; MG/100ML
125 INJECTION, SOLUTION INTRAVENOUS CONTINUOUS
Status: DISCONTINUED | OUTPATIENT
Start: 2023-02-17 | End: 2023-02-17 | Stop reason: HOSPADM

## 2023-02-17 RX ORDER — LIDOCAINE HYDROCHLORIDE 10 MG/ML
INJECTION, SOLUTION EPIDURAL; INFILTRATION; INTRACAUDAL; PERINEURAL AS NEEDED
Status: DISCONTINUED | OUTPATIENT
Start: 2023-02-17 | End: 2023-02-17

## 2023-02-17 RX ORDER — CEPHALEXIN 500 MG/1
500 CAPSULE ORAL EVERY 6 HOURS SCHEDULED
Qty: 20 CAPSULE | Refills: 0 | Status: SHIPPED | OUTPATIENT
Start: 2023-02-17 | End: 2023-02-22

## 2023-02-17 RX ORDER — PROMETHAZINE HYDROCHLORIDE 25 MG/ML
12.5 INJECTION, SOLUTION INTRAMUSCULAR; INTRAVENOUS ONCE AS NEEDED
Status: DISCONTINUED | OUTPATIENT
Start: 2023-02-17 | End: 2023-02-17 | Stop reason: HOSPADM

## 2023-02-17 RX ORDER — MIDAZOLAM HYDROCHLORIDE 2 MG/2ML
INJECTION, SOLUTION INTRAMUSCULAR; INTRAVENOUS AS NEEDED
Status: DISCONTINUED | OUTPATIENT
Start: 2023-02-17 | End: 2023-02-17

## 2023-02-17 RX ORDER — ONDANSETRON 2 MG/ML
INJECTION INTRAMUSCULAR; INTRAVENOUS AS NEEDED
Status: DISCONTINUED | OUTPATIENT
Start: 2023-02-17 | End: 2023-02-17

## 2023-02-17 RX ORDER — CEFAZOLIN SODIUM 1 G/50ML
1000 SOLUTION INTRAVENOUS ONCE
Status: COMPLETED | OUTPATIENT
Start: 2023-02-17 | End: 2023-02-17

## 2023-02-17 RX ORDER — CHLORHEXIDINE GLUCONATE 0.12 MG/ML
15 RINSE ORAL ONCE
Status: COMPLETED | OUTPATIENT
Start: 2023-02-17 | End: 2023-02-17

## 2023-02-17 RX ORDER — OXYCODONE HYDROCHLORIDE AND ACETAMINOPHEN 5; 325 MG/1; MG/1
1 TABLET ORAL EVERY 4 HOURS PRN
Status: DISCONTINUED | OUTPATIENT
Start: 2023-02-17 | End: 2023-02-17 | Stop reason: HOSPADM

## 2023-02-17 RX ORDER — ONDANSETRON 2 MG/ML
4 INJECTION INTRAMUSCULAR; INTRAVENOUS ONCE AS NEEDED
Status: DISCONTINUED | OUTPATIENT
Start: 2023-02-17 | End: 2023-02-17 | Stop reason: HOSPADM

## 2023-02-17 RX ORDER — CHLORHEXIDINE GLUCONATE 4 G/100ML
SOLUTION TOPICAL DAILY PRN
Status: DISCONTINUED | OUTPATIENT
Start: 2023-02-17 | End: 2023-02-17 | Stop reason: HOSPADM

## 2023-02-17 RX ORDER — FENTANYL CITRATE 50 UG/ML
INJECTION, SOLUTION INTRAMUSCULAR; INTRAVENOUS AS NEEDED
Status: DISCONTINUED | OUTPATIENT
Start: 2023-02-17 | End: 2023-02-17

## 2023-02-17 RX ORDER — OXYCODONE HYDROCHLORIDE AND ACETAMINOPHEN 5; 325 MG/1; MG/1
1 TABLET ORAL EVERY 6 HOURS PRN
Qty: 20 TABLET | Refills: 0 | Status: SHIPPED | OUTPATIENT
Start: 2023-02-17 | End: 2023-02-22

## 2023-02-17 RX ORDER — DEXAMETHASONE SODIUM PHOSPHATE 10 MG/ML
INJECTION, SOLUTION INTRAMUSCULAR; INTRAVENOUS AS NEEDED
Status: DISCONTINUED | OUTPATIENT
Start: 2023-02-17 | End: 2023-02-17

## 2023-02-17 RX ORDER — PROPOFOL 10 MG/ML
INJECTION, EMULSION INTRAVENOUS AS NEEDED
Status: DISCONTINUED | OUTPATIENT
Start: 2023-02-17 | End: 2023-02-17

## 2023-02-17 RX ORDER — EPHEDRINE SULFATE 50 MG/ML
INJECTION INTRAVENOUS AS NEEDED
Status: DISCONTINUED | OUTPATIENT
Start: 2023-02-17 | End: 2023-02-17

## 2023-02-17 RX ORDER — FENTANYL CITRATE/PF 50 MCG/ML
25 SYRINGE (ML) INJECTION
Status: DISCONTINUED | OUTPATIENT
Start: 2023-02-17 | End: 2023-02-17 | Stop reason: HOSPADM

## 2023-02-17 RX ORDER — MAGNESIUM HYDROXIDE 1200 MG/15ML
LIQUID ORAL AS NEEDED
Status: DISCONTINUED | OUTPATIENT
Start: 2023-02-17 | End: 2023-02-17 | Stop reason: HOSPADM

## 2023-02-17 RX ADMIN — SODIUM CHLORIDE, SODIUM LACTATE, POTASSIUM CHLORIDE, AND CALCIUM CHLORIDE: .6; .31; .03; .02 INJECTION, SOLUTION INTRAVENOUS at 06:39

## 2023-02-17 RX ADMIN — EPHEDRINE SULFATE 5 MG: 50 INJECTION, SOLUTION INTRAVENOUS at 07:54

## 2023-02-17 RX ADMIN — EPHEDRINE SULFATE 5 MG: 50 INJECTION, SOLUTION INTRAVENOUS at 08:18

## 2023-02-17 RX ADMIN — ONDANSETRON 4 MG: 2 INJECTION INTRAMUSCULAR; INTRAVENOUS at 08:40

## 2023-02-17 RX ADMIN — PROPOFOL 140 MCG/KG/MIN: 10 INJECTION, EMULSION INTRAVENOUS at 07:39

## 2023-02-17 RX ADMIN — CEFAZOLIN SODIUM 1000 MG: 1 SOLUTION INTRAVENOUS at 07:34

## 2023-02-17 RX ADMIN — SODIUM CHLORIDE, SODIUM LACTATE, POTASSIUM CHLORIDE, AND CALCIUM CHLORIDE: .6; .31; .03; .02 INJECTION, SOLUTION INTRAVENOUS at 08:51

## 2023-02-17 RX ADMIN — FENTANYL CITRATE 50 MCG: 50 INJECTION, SOLUTION INTRAMUSCULAR; INTRAVENOUS at 08:57

## 2023-02-17 RX ADMIN — CHLORHEXIDINE GLUCONATE 0.12% ORAL RINSE 15 ML: 1.2 LIQUID ORAL at 06:40

## 2023-02-17 RX ADMIN — PROPOFOL 50 MG: 10 INJECTION, EMULSION INTRAVENOUS at 07:41

## 2023-02-17 RX ADMIN — MIDAZOLAM 2 MG: 1 INJECTION INTRAMUSCULAR; INTRAVENOUS at 07:33

## 2023-02-17 RX ADMIN — DEXAMETHASONE SODIUM PHOSPHATE 5 MG: 10 INJECTION, SOLUTION INTRAMUSCULAR; INTRAVENOUS at 07:39

## 2023-02-17 RX ADMIN — FENTANYL CITRATE 50 MCG: 50 INJECTION, SOLUTION INTRAMUSCULAR; INTRAVENOUS at 07:33

## 2023-02-17 RX ADMIN — LIDOCAINE HYDROCHLORIDE 50 MG: 10 INJECTION, SOLUTION EPIDURAL; INFILTRATION; INTRACAUDAL; PERINEURAL at 07:39

## 2023-02-17 NOTE — DISCHARGE INSTR - AVS FIRST PAGE
Dr Pedro Luis Yu  Post-Operative Instructions    Pain / Swelling  There is expected to be some discomfort, swelling and bruising of the foot  You might see some blood on the bandage  This is not a cause for alarm  However, if there is active or persistent bleeding (blood running out of the bandage while at rest) - call the office at once  Apply an ice bag to left foot for 30 minutes for each waking hour, for the first 72 hours  This should be discontinued when sleeping  This will also work through your cast if you have one  Ice must not leak and wet the dressings  Also, using the ice inappropriately can cause permanent nerve damage and frostbite  Your foot should be elevated as much as possible for the first 72 hours  The foot should be above heart level  If your foot is below heart level, throbbing and pain will increase  When sleeping, elevation can be accomplished by putting a small hard suitcase between the box spring and mattress at the foot of the bed  Walking and standing will increase pain, throbbing and bleeding  Persistent pain despite elevation and your pain meds can many times be relieved by removing the tight brown compression layer (called the ACE wrap) that is over the white gauze dressing  If you are elevating and taking your pain meds and pain is still severe, remove this brown stretchy layer but leave the gauze intact  Wait 30 minutes  If the pain subsides, reapply the ACE so it’s not so tight  If pain doesn’t get better, call your doctor  Dressings / Casts  Do not remove your surgical dressings - they will be changed at your doctor appointment  Do not allow surgical dressings to get wet  Sponge baths should be used until the sutures are removed  Do not try to keep the foot dry using a garbage bag and tape - this rarely works  If you get your dressings or cast wet - call your doctor immediately  If your cast or dressings feel tight - elevate your foot for 30 minutes   If this doesn’t help and you feel tingling or see toe discoloration - call your doctor  Do not put things in your cast such as powder, coat hangers to scratch, etc  This can cause skin damage and infections  Infection  If you have a fever at or above 100 degrees, chills, sweats, or notice red streaks rising above the dressing or smell odor / see pus (creamy white drainage), call your doctor immediately  Constipation  If you have severe constipation after surgery, this can be due to the pain medication  Notify your doctor and special medication will be prescribed to deal with this  Numbness  It is normal for your foot to be numb until about dinner time  If you’ve had a popliteal block procedure, you might be numb until the following day  When you start to feel pins and needles in the foot - this means the block is wearing off  That is the appropriate time to take your pain medication  Pain Medication  Take your post-operative pain medication as directed by your surgeon  Do not supplement your pain medication with over the counter drugs, old leftover pain medications, or extra Tylenol  You must discuss any additional medications with your doctor prior to taking them for pain  Driving  No driving is allowed without discussion with the doctor  Ambulation  Heel touch to surgical foot  If given a flat, stiff shoe / darco wedge shoe, Do not walk at all without it  Use a device (cane, walker, crutches) to take some weight off of the foot when walking  If instructed not to put weight on the surgical foot, use the following:  Crutches     Putting weight on the foot will lead to complications

## 2023-02-17 NOTE — ANESTHESIA POSTPROCEDURE EVALUATION
Post-Op Assessment Note    CV Status:  Stable  Pain Score: 0    Pain management: adequate     Mental Status:  Alert and awake   Hydration Status:  Stable   PONV Controlled:  None   Airway Patency:  Patent      Post Op Vitals Reviewed: Yes      Staff: CRNA         No notable events documented      /73 (02/17/23 0915)    Temp     Pulse (!) 42 (02/17/23 0915)   Resp 20 (02/17/23 0915)    SpO2 100 % (02/17/23 0915)

## 2023-02-17 NOTE — NURSING NOTE
Tolerating po well  Discharge instructions reviewed and daughter to drive home   Crutches and surgical shoe dispensed

## 2023-02-17 NOTE — OP NOTE
OPERATIVE REPORT - Podiatry  PATIENT NAME: Silvia Gaviria    :  1970  MRN: [id number]  Pt Location:  OR ROOM 12    SURGERY DATE: 2023    Surgeon(s) and Role:     * Rudy Graves DPM - Primary     * Yue Shea, OLIVIA - Assisting    Pre-op Diagnosis:  Acquired hallux valgus of left foot [M20 12]  Acquired deformity of left toe [M20 62]    Post-Op Diagnosis Codes:     * Acquired hallux valgus of left foot [M20 12]     * Acquired deformity of left toe [M20 62]    Procedure(s) (LRB):  BUNIONECTOMY KELTON LEFT FOOT (Left)  OSTEOTOMY OF PROXIMAL PHALANX LEFT GREAT TOE (Left)    Specimen(s):  * No specimens in log *    Estimated Blood Loss:   Minimal    Drains:  * No LDAs found *    Anesthesia Type:   Choice with 20 ml of 1% Lidocaine and 0 5% Bupivacaine in a 1:1 mixture prior to incision  Hemostasis:  Pneumatic ankle tourniquet, anatomic dissection, electrocautery    Materials:  Implant Name Type Inv  Item Serial No   Lot No  LRB No  Used Action   SCREW 3 X 16MM VILEX - [id number]  SCREW 3 X 16MM VILEX  VILEX  Left 1 Implanted   STAPLE DYNANITE KEVIN W/INSTRUS 11W X 10L - [id number] Staple STAPLE DYNANITE KEVIN W/INSTRUS 11W X 10L  [employer] [id number] Left 1 Implanted     Vicryl  Monocryl    Operative Findings:  Consistent with diagnosis    Complications:   None    Procedure and Technique:     Under mild sedation, the patient was brought into the operating room and placed on the operating room table in the supine position  IV sedation was achieved by anesthesia team and a universal timeout was performed where all parties are in agreement of correct patient, correct procedure and correct site  A pneumatic tourniquet was then placed over the patient's left lower extremity with ample padding  A local block was performed consisting of 20 ml of 1% Lidocaine and 0 5% Bupivacaine in a 1:1 mixture  The foot was then prepped and draped in the usual aseptic manner   An esmarch bandage was used to [employer] the foot and the pneumatic tourniquet was then inflated to 250mmHg  Attention was then directed to the dorsal aspect of the first metatarsophalangeal joint medial to the EHL tendon where an approximately 6 cm linear incision was made through skin and subcutaneous tissue  The incision was deepened through the subcutaneous tissues using sharp and blunt dissection  Care was taken to identify and retract all vital neural and vascular structures  All bleeders were cauterized and ligated as necessary  A capsuloptomy was performed over the dorsal aspect of the MPJ  The periosteal and capsular structures were then carefully dissected free of their osseous attachments and reflected medially and laterally, thus exposing the head of the first metatarsal at the operative site  Attention was then directed to the 1st interspace via the original skin incision where the dissection was continued deep using sharp dissection down to the conjoined tendon of the adductor halluces was then identified and transected at its attachment  At this time the lateral contraction presents on the hallux was noted to be reduced and the sesamoid apparatus was noted to float into a more corrected medial position  Attention was then directed to the first met head where the medial prominence was resected by the sagittal bone saw  A k-wire was used as a guidewire at the proximal-medial aspect of the 1st met head  A through and through V type osteotomy was made at a 60 degree angle utilizing a George osteotomy guide  This cut was created in the metataphyseal region of the bone utilizing a sagittal bone saw and the apices of this osteotomy pointing proximal plantarly and proximal dorsally  Upon completion of this osteotomy, the capital fragment was distracted and shifted laterally into a more corrected position and impacted onto the shaft of the first met  2 K wires were used as temp fixation across the osteotomy site   With proper technique, one compression screw (see implants) serves as fixation across the osteotomy site  Attention was directed to the remaining medial bone shelf proximal to the osteotomy site which was resected using a sagittal saw and passed from operative field  There is still a noted deformity in the hallux so at this time attention was directed to the hallux  The skin incision was extended distally onto the proximal phalanx  Incision was carried down through the subcutaneous tissue taking care to retract all vital neurovascular structures  Upon reaching the capsule a linear capsulotomy was performed and the capsule was reflected away from the proximal phalanx  At this time a guidewire was inserted into the lateral cortex of the proximal phalanx and an Akin osteotomy was performed  The lateral hinge remained intact  The osteotomy was then fixated utilizing an Ar[employer] staple (see implants above)  The foot was then loaded at this time there is noted to be excellent reduction of deformity  The surgical incision was irrigated with copious amounts of normal sterile saline  The periosteal and capsular structures were reapproximated using Vicryl  Subcutaneous closure was obtained utilizing Vicryl  Skin edges were reapproximated and closure was obtained utilizing Steri-strips and monocryl  The foot was then cleansed and dried  A postoperative injection consisting of 20 ml of Exparel was performed  The incision site was dressed with Betadine soaked adaptic and a dry sterile dressing  The tourniquet was deflated and normal hyperemic flush was noted to all digits  The patient tolerated the procedure and anesthesia well and was transported to the PACU with vital signs stable  Dr Sue Matt was present during the entire procedure and participated in all key aspects  SIGNATURE: Bert Durham DPM  DATE: February 17, 2023  TIME: 9:22 AM      Portions of the record may have been created with voice recognition software   Occasional wrong word or "sound a like" substitutions may have occurred due to the inherent limitations of voice recognition software  Read the chart carefully and recognize, using context, where substitutions have occurred

## 2023-02-23 ENCOUNTER — OFFICE VISIT (OUTPATIENT)
Dept: PODIATRY | Facility: CLINIC | Age: 53
End: 2023-02-23

## 2023-02-23 VITALS — BODY MASS INDEX: 22.47 KG/M2 | WEIGHT: 119 LBS | HEIGHT: 61 IN

## 2023-02-23 DIAGNOSIS — M20.12 HALLUX VALGUS WITH BUNIONS OF LEFT FOOT: Primary | ICD-10-CM

## 2023-02-23 DIAGNOSIS — M21.612 HALLUX VALGUS WITH BUNIONS OF LEFT FOOT: Primary | ICD-10-CM

## 2023-02-23 NOTE — PROGRESS NOTES
PATIENT:  Cezar East      1970    ASSESSMENT     1  Hallux valgus with bunions of left foot               PLAN  Patient is doing well post-operatively  Sutures left intact  Incision was cleaned with betadine and DSD applied to be kept C/D/I  Continue post-op care as instructed  Stressed on patient compliance about proper off-loading, staying off of feet, and proper dressing care  Call if any increase in pain, fevers, calf pain, shortness of breath, or general distress is noted  Patient instructed to go to ER if call is not returned immediately  HISTORY OF PRESENT ILLNESS  Patient presents for post-op appointment  Post-op pain is under control and resolving well  The patient is feeling well and in good spirits  Patient reported no post-op concern  REVIEW OF SYSTEMS  GENERAL: No fever or chills  HEART: No chest pain, or palpitation  RESPIRATORY:  No SOB or cough  GI: No Nausea, vomit or diarrhea  NEUROLOGIC: No syncope or acute weakness  MUSCULOSKELETAL: No calf pain or edema  PHYSICAL EXAMINATION    Ht 5' 1" (1 549 m) Comment: verbal  Wt 54 kg (119 lb) Comment: verbal, using crutches  BMI 22 48 kg/m²     GENERAL  The patient appears in NAD / non-toxic  Afebrile  VSS    VASCULAR EXAM  Pedal pulses and vascular status are intact  No calf pain or edema bilaterally  No cyanosis  DERMATOLOGIC EXAM  Incision is coapted and healing well  No signs of infection  No active drainage  Normal post-op edema and ecchymosis  No necrosis or dehiscence  NEUROLOGIC EXAM  AAO X 3  No focal neurologic deficit  Neurologic status is intact BLE  MUSCULOSKELETAL EXAM  Good surgical correction  Normal post-op findings  ROM intact  No fluctuation or crepitus

## 2023-03-09 ENCOUNTER — TELEPHONE (OUTPATIENT)
Dept: HEMATOLOGY ONCOLOGY | Facility: MEDICAL CENTER | Age: 53
End: 2023-03-09

## 2023-03-09 NOTE — TELEPHONE ENCOUNTER
Left voicemail for patient regarding her labs needing to be completed prior to her appointment with Dr Hernandez on 3/10  Patient instructed to contact the office if unable to complete and we will reschedule

## 2023-03-10 ENCOUNTER — TELEPHONE (OUTPATIENT)
Dept: OBGYN CLINIC | Facility: OTHER | Age: 53
End: 2023-03-10

## 2023-03-10 NOTE — TELEPHONE ENCOUNTER
Appointment Cancellation or Reschedule     Person calling in Patient   If someone other than patient calling, are they listed on the communication consent form? N/A   Provider Dr Leydi Pereira   Office Visit Date and Time  3/10/23 11am   Office Visit Location [place]   Did patient want to reschedule their visit? If so, when was it scheduled to? pt did not wish to reschedule   Did the patient have STAR scheduled for this appointment? No   Does the patient need STAR scheduled for their new appointment? No   Is this patient calling to reschedule an infusion appointment? No   When is their next infusion appointment? n/a   Is this patient a Chemo patient? No   Reason for Cancellation or Reschedule Pt did not have labs done   Was the No show policy reviewed with patient if patient is canceling within 24 hours? Yes, pt aware appt will be a no show     If the patient is cancelling an appointment and needs their STAR Transport cancelled, please route to [facility]  If the patient is a treatment patient, please route this to the office nurse  If the patient is not on treatment, please route to the Clerical pool based on location  If the patient is a surgical oncology patient, please route to surg/onc clinical pool  Route message as high priority if same day cancellation

## 2023-03-16 NOTE — PROGRESS NOTES
Assessment/Plan:     No problem-specific Assessment & Plan notes found for this encounter  Diagnoses and all orders for this visit:    Encounter for gynecological examination (general) (routine) without abnormal findings    Possible exposure to STD  -     Chlamydia/GC amplified DNA by PCR  -     HIV 1/2 AG/AB W REFLEX [facility] and [facility] only; Future  -     RPR-Syphilis Screening (Total Syphilis IGG/IGM); Future  -     Hepatitis C antibody; Future      Depression Screening Follow-up Plan: Patient's depression screening was positive with a PHQ-2 score of 0  Their PHQ-9 score was 2  Clinically patient does not have depression  No treatment is required  Subjective:      Patient ID: Peggy Orosco is a 46 y o  female who presents for annual exam  Her last pap was 03/08/22 and was negative with negative HPV  Her mammogram is ordered  Her colonoscopy is up to date and normal   She desires STD testing  She has occasional periods  HPI    The following portions of the patient's history were reviewed and updated as appropriate: allergies, current medications, past family history, past medical history, past social history, past surgical history and problem list     Review of Systems      Objective:      /62   Pulse 57   Ht 5' 1 2" (1 554 m)   Wt 54 3 kg (119 lb 9 6 oz)   BMI 22 45 kg/m²          Physical Exam  Vitals and nursing note reviewed  Exam conducted with a chaperone present  Constitutional:       General: She is not in acute distress  Appearance: She is well-developed  HENT:      Head: Normocephalic  Neck:      Thyroid: No thyromegaly  Cardiovascular:      Rate and Rhythm: Normal rate and regular rhythm  Heart sounds: Normal heart sounds  No murmur heard  Pulmonary:      Effort: Pulmonary effort is normal  No respiratory distress  Breath sounds: Normal breath sounds  No wheezing or rales  Chest:      Chest wall: No tenderness     Breasts:     Right: No swelling, bleeding, inverted nipple, mass, nipple discharge, skin change or tenderness  Left: No swelling, bleeding, inverted nipple, mass, nipple discharge, skin change or tenderness  Abdominal:      General: Bowel sounds are normal  There is no distension  Palpations: Abdomen is soft  There is no mass  Tenderness: There is no abdominal tenderness  There is no guarding or rebound  Genitourinary:     Labia:         Right: No rash, tenderness, lesion or injury  Left: No rash, tenderness, lesion or injury  Vagina: Normal       Cervix: Normal       Uterus: Normal        Adnexa:         Right: No mass, tenderness or fullness  Left: No mass, tenderness or fullness  Rectum: No external hemorrhoid  Skin:     General: Skin is warm and dry  Neurological:      Mental Status: She is alert and oriented to person, place, and time  Psychiatric:         Behavior: Behavior normal          Thought Content:  Thought content normal          Judgment: Judgment normal

## 2023-03-21 ENCOUNTER — APPOINTMENT (OUTPATIENT)
Dept: LAB | Facility: HOSPITAL | Age: 53
End: 2023-03-21

## 2023-03-21 ENCOUNTER — ANNUAL EXAM (OUTPATIENT)
Dept: OBGYN CLINIC | Facility: CLINIC | Age: 53
End: 2023-03-21

## 2023-03-21 VITALS
SYSTOLIC BLOOD PRESSURE: 101 MMHG | HEIGHT: 61 IN | HEART RATE: 57 BPM | WEIGHT: 119.6 LBS | DIASTOLIC BLOOD PRESSURE: 62 MMHG | BODY MASS INDEX: 22.58 KG/M2

## 2023-03-21 DIAGNOSIS — D50.8 OTHER IRON DEFICIENCY ANEMIA: ICD-10-CM

## 2023-03-21 DIAGNOSIS — D47.2 MONOCLONAL GAMMOPATHY: ICD-10-CM

## 2023-03-21 DIAGNOSIS — Z01.419 ENCOUNTER FOR GYNECOLOGICAL EXAMINATION (GENERAL) (ROUTINE) WITHOUT ABNORMAL FINDINGS: Primary | ICD-10-CM

## 2023-03-21 DIAGNOSIS — Z20.2 POSSIBLE EXPOSURE TO STD: ICD-10-CM

## 2023-03-21 LAB
ALBUMIN SERPL BCP-MCNC: 4.2 G/DL (ref 3.5–5)
ALP SERPL-CCNC: 67 U/L (ref 34–104)
ALT SERPL W P-5'-P-CCNC: 50 U/L (ref 7–52)
ANION GAP SERPL CALCULATED.3IONS-SCNC: 8 MMOL/L (ref 4–13)
AST SERPL W P-5'-P-CCNC: 92 U/L (ref 13–39)
BASOPHILS # BLD AUTO: 0.03 THOUSANDS/ÂΜL (ref 0–0.1)
BASOPHILS NFR BLD AUTO: 1 % (ref 0–1)
BILIRUB SERPL-MCNC: 0.4 MG/DL (ref 0.2–1)
BUN SERPL-MCNC: 22 MG/DL (ref 5–25)
CALCIUM SERPL-MCNC: 9 MG/DL (ref 8.4–10.2)
CHLORIDE SERPL-SCNC: 104 MMOL/L (ref 96–108)
CO2 SERPL-SCNC: 27 MMOL/L (ref 21–32)
CREAT SERPL-MCNC: 0.85 MG/DL (ref 0.6–1.3)
CRP SERPL QL: <1 MG/L
EOSINOPHIL # BLD AUTO: 0.16 THOUSAND/ÂΜL (ref 0–0.61)
EOSINOPHIL NFR BLD AUTO: 3 % (ref 0–6)
ERYTHROCYTE [DISTWIDTH] IN BLOOD BY AUTOMATED COUNT: 12.7 % (ref 11.6–15.1)
ERYTHROCYTE [SEDIMENTATION RATE] IN BLOOD: 18 MM/HOUR (ref 0–29)
FERRITIN SERPL-MCNC: 89 NG/ML (ref 8–388)
GFR SERPL CREATININE-BSD FRML MDRD: 79 ML/MIN/1.73SQ M
GLUCOSE P FAST SERPL-MCNC: 87 MG/DL (ref 65–99)
HCT VFR BLD AUTO: 37.5 % (ref 34.8–46.1)
HCV AB SER QL: NORMAL
HGB BLD-MCNC: 11.8 G/DL (ref 11.5–15.4)
IGA SERPL-MCNC: 198 MG/DL (ref 70–400)
IGG SERPL-MCNC: 1330 MG/DL (ref 700–1600)
IGM SERPL-MCNC: 127 MG/DL (ref 40–230)
IMM GRANULOCYTES # BLD AUTO: 0.01 THOUSAND/UL (ref 0–0.2)
IMM GRANULOCYTES NFR BLD AUTO: 0 % (ref 0–2)
IRON SATN MFR SERPL: 25 % (ref 15–50)
IRON SERPL-MCNC: 90 UG/DL (ref 50–170)
LDH SERPL-CCNC: 236 U/L (ref 140–271)
LYMPHOCYTES # BLD AUTO: 2.13 THOUSANDS/ÂΜL (ref 0.6–4.47)
LYMPHOCYTES NFR BLD AUTO: 43 % (ref 14–44)
MCH RBC QN AUTO: 30.3 PG (ref 26.8–34.3)
MCHC RBC AUTO-ENTMCNC: 31.5 G/DL (ref 31.4–37.4)
MCV RBC AUTO: 96 FL (ref 82–98)
MONOCYTES # BLD AUTO: 0.38 THOUSAND/ÂΜL (ref 0.17–1.22)
MONOCYTES NFR BLD AUTO: 8 % (ref 4–12)
NEUTROPHILS # BLD AUTO: 2.24 THOUSANDS/ÂΜL (ref 1.85–7.62)
NEUTS SEG NFR BLD AUTO: 45 % (ref 43–75)
NRBC BLD AUTO-RTO: 0 /100 WBCS
PLATELET # BLD AUTO: 191 THOUSANDS/UL (ref 149–390)
PMV BLD AUTO: 10.1 FL (ref 8.9–12.7)
POTASSIUM SERPL-SCNC: 3.8 MMOL/L (ref 3.5–5.3)
PROT SERPL-MCNC: 7 G/DL (ref 6.4–8.4)
RBC # BLD AUTO: 3.9 MILLION/UL (ref 3.81–5.12)
SODIUM SERPL-SCNC: 139 MMOL/L (ref 135–147)
TIBC SERPL-MCNC: 354 UG/DL (ref 250–450)
TREPONEMA PALLIDUM IGG+IGM AB [PRESENCE] IN SERUM OR PLASMA BY IMMUNOASSAY: NORMAL
VIT B12 SERPL-MCNC: 691 PG/ML (ref 100–900)
WBC # BLD AUTO: 4.95 THOUSAND/UL (ref 4.31–10.16)

## 2023-03-22 ENCOUNTER — TELEPHONE (OUTPATIENT)
Dept: FAMILY MEDICINE CLINIC | Facility: CLINIC | Age: 53
End: 2023-03-22

## 2023-03-22 LAB
C TRACH DNA SPEC QL NAA+PROBE: NEGATIVE
HIV 1+2 AB+HIV1 P24 AG SERPL QL IA: NON REACTIVE
KAPPA LC FREE SER-MCNC: 21.5 MG/L (ref 3.3–19.4)
KAPPA LC FREE/LAMBDA FREE SER: 1.18 {RATIO} (ref 0.26–1.65)
LAMBDA LC FREE SERPL-MCNC: 18.2 MG/L (ref 5.7–26.3)
N GONORRHOEA DNA SPEC QL NAA+PROBE: NEGATIVE

## 2023-03-23 ENCOUNTER — OFFICE VISIT (OUTPATIENT)
Dept: PODIATRY | Facility: CLINIC | Age: 53
End: 2023-03-23

## 2023-03-23 VITALS
WEIGHT: 119 LBS | BODY MASS INDEX: 22.47 KG/M2 | HEIGHT: 61 IN | SYSTOLIC BLOOD PRESSURE: 159 MMHG | HEART RATE: 67 BPM | DIASTOLIC BLOOD PRESSURE: 72 MMHG

## 2023-03-23 DIAGNOSIS — M20.12 HALLUX VALGUS WITH BUNIONS OF LEFT FOOT: Primary | ICD-10-CM

## 2023-03-23 DIAGNOSIS — M21.612 HALLUX VALGUS WITH BUNIONS OF LEFT FOOT: Primary | ICD-10-CM

## 2023-03-23 LAB
ALBUMIN SERPL ELPH-MCNC: 4.26 G/DL (ref 3.5–5)
ALBUMIN SERPL ELPH-MCNC: 56.1 % (ref 52–65)
ALPHA1 GLOB SERPL ELPH-MCNC: 0.3 G/DL (ref 0.1–0.4)
ALPHA1 GLOB SERPL ELPH-MCNC: 4 % (ref 2.5–5)
ALPHA2 GLOB SERPL ELPH-MCNC: 0.84 G/DL (ref 0.4–1.2)
ALPHA2 GLOB SERPL ELPH-MCNC: 11 % (ref 7–13)
BETA GLOB ABNORMAL SERPL ELPH-MCNC: 0.43 G/DL (ref 0.4–0.8)
BETA1 GLOB SERPL ELPH-MCNC: 5.7 % (ref 5–13)
BETA2 GLOB SERPL ELPH-MCNC: 4.8 % (ref 2–8)
BETA2+GAMMA GLOB SERPL ELPH-MCNC: 0.36 G/DL (ref 0.2–0.5)
GAMMA GLOB ABNORMAL SERPL ELPH-MCNC: 1.4 G/DL (ref 0.5–1.6)
GAMMA GLOB SERPL ELPH-MCNC: 18.4 % (ref 12–22)
IGG/ALB SER: 1.28 {RATIO} (ref 1.1–1.8)
M PROTEIN 1 MFR SERPL ELPH: 6.5 %
M PROTEIN 1 SERPL ELPH-MCNC: 0.49 G/DL
PROT PATTERN SERPL ELPH-IMP: NORMAL
PROT SERPL-MCNC: 7.6 G/DL (ref 6.4–8.2)

## 2023-03-23 NOTE — PROGRESS NOTES
PATIENT:  Steff Gonzalez      1970    ASSESSMENT     1  Hallux valgus with bunions of left foot  XR foot 3+ vw left             PLAN  Patient is doing well post-operatively  X-ray was reviewed and discussed  Okay to try regular shoes in 1-2 weeks  Continue post-op care as instructed  Call if any increase in pain, fevers, calf pain, shortness of breath, or general distress is noted  Patient instructed to go to ER if call is not returned immediately  RA in 4 weeks  HISTORY OF PRESENT ILLNESS  Patient presents for post-op appointment  Post-op pain is mostly resolved  The patient is feeling well and in good spirits  Patient reported no post-op concern  REVIEW OF SYSTEMS  GENERAL: No fever or chills  HEART: No chest pain, or palpitation  RESPIRATORY:  No SOB or cough  GI: No Nausea, vomit or diarrhea  NEUROLOGIC: No syncope or acute weakness  MUSCULOSKELETAL: No calf pain or edema  PHYSICAL EXAMINATION    /72   Pulse 67   Ht 5' 1" (1 549 m)   Wt 54 kg (119 lb)   BMI 22 48 kg/m²     GENERAL  The patient appears in NAD / non-toxic  Afebrile  VSS    VASCULAR EXAM  Pedal pulses and vascular status are intact  No calf pain or edema bilaterally  No cyanosis  DERMATOLOGIC EXAM  No signs of infection  No drainage  Post-op edema is resolving  No necrosis or dehiscence  NEUROLOGIC EXAM  AAO X 3  No focal neurologic deficit  Neurologic status is intact BLE  MUSCULOSKELETAL EXAM  Good surgical correction  Normal post-op findings  ROM intact  No fluctuation or crepitus

## 2023-03-23 NOTE — TELEPHONE ENCOUNTER
I reviewed the labs that were ordered by hematology/oncology; one of her liver enzymes was elevated so I would like to repeat that and we can discuss further in our next office visit  I also see that her IgG kappa free light chain was a little elevated so I recommend that she schedule a follow up appointment with hematology/oncology to discuss this as she missed the last appointment   Thank you

## 2023-03-28 DIAGNOSIS — R74.01 ELEVATED AST (SGOT): Primary | ICD-10-CM

## 2023-03-28 NOTE — TELEPHONE ENCOUNTER
I let the patient know what your message said, but she is very scared and very worried  She thinks she's going to die  She doesn't want to wait months for tests, because she thinks liver damage will be unreversable  She asked for you to call her directly

## 2023-03-31 ENCOUNTER — APPOINTMENT (OUTPATIENT)
Dept: LAB | Facility: HOSPITAL | Age: 53
End: 2023-03-31

## 2023-03-31 DIAGNOSIS — R74.01 ELEVATED AST (SGOT): ICD-10-CM

## 2023-03-31 LAB
ALBUMIN SERPL BCP-MCNC: 4.3 G/DL (ref 3.5–5)
ALP SERPL-CCNC: 66 U/L (ref 34–104)
ALT SERPL W P-5'-P-CCNC: 23 U/L (ref 7–52)
ANION GAP SERPL CALCULATED.3IONS-SCNC: 7 MMOL/L (ref 4–13)
AST SERPL W P-5'-P-CCNC: 23 U/L (ref 13–39)
BILIRUB SERPL-MCNC: 0.53 MG/DL (ref 0.2–1)
BUN SERPL-MCNC: 13 MG/DL (ref 5–25)
CALCIUM SERPL-MCNC: 9.4 MG/DL (ref 8.4–10.2)
CHLORIDE SERPL-SCNC: 107 MMOL/L (ref 96–108)
CO2 SERPL-SCNC: 26 MMOL/L (ref 21–32)
CREAT SERPL-MCNC: 0.77 MG/DL (ref 0.6–1.3)
GFR SERPL CREATININE-BSD FRML MDRD: 89 ML/MIN/1.73SQ M
GLUCOSE P FAST SERPL-MCNC: 81 MG/DL (ref 65–99)
POTASSIUM SERPL-SCNC: 3.8 MMOL/L (ref 3.5–5.3)
PROT SERPL-MCNC: 7 G/DL (ref 6.4–8.4)
SODIUM SERPL-SCNC: 140 MMOL/L (ref 135–147)

## 2023-05-15 DIAGNOSIS — I10 ESSENTIAL HYPERTENSION: ICD-10-CM

## 2023-05-15 RX ORDER — AMLODIPINE BESYLATE 5 MG/1
5 TABLET ORAL DAILY
Qty: 90 TABLET | Refills: 0 | Status: SHIPPED | OUTPATIENT
Start: 2023-05-15

## 2023-08-01 ENCOUNTER — VBI (OUTPATIENT)
Dept: ADMINISTRATIVE | Facility: OTHER | Age: 53
End: 2023-08-01

## 2023-08-30 DIAGNOSIS — I10 ESSENTIAL HYPERTENSION: ICD-10-CM

## 2023-08-31 RX ORDER — AMLODIPINE BESYLATE 5 MG/1
5 TABLET ORAL DAILY
Qty: 90 TABLET | Refills: 0 | Status: SHIPPED | OUTPATIENT
Start: 2023-08-31

## 2023-10-18 ENCOUNTER — OFFICE VISIT (OUTPATIENT)
Dept: FAMILY MEDICINE CLINIC | Facility: CLINIC | Age: 53
End: 2023-10-18
Payer: COMMERCIAL

## 2023-10-18 VITALS
HEIGHT: 61 IN | HEART RATE: 66 BPM | BODY MASS INDEX: 23.15 KG/M2 | OXYGEN SATURATION: 100 % | DIASTOLIC BLOOD PRESSURE: 70 MMHG | SYSTOLIC BLOOD PRESSURE: 120 MMHG | WEIGHT: 122.6 LBS | TEMPERATURE: 97 F

## 2023-10-18 DIAGNOSIS — R73.03 PREDIABETES: ICD-10-CM

## 2023-10-18 DIAGNOSIS — M79.631 RIGHT FOREARM PAIN: Primary | ICD-10-CM

## 2023-10-18 DIAGNOSIS — I10 ESSENTIAL HYPERTENSION: ICD-10-CM

## 2023-10-18 DIAGNOSIS — E78.5 DYSLIPIDEMIA: ICD-10-CM

## 2023-10-18 PROBLEM — G89.29 CHRONIC LEFT SHOULDER PAIN: Status: RESOLVED | Noted: 2021-11-19 | Resolved: 2023-10-18

## 2023-10-18 PROBLEM — M25.512 CHRONIC LEFT SHOULDER PAIN: Status: RESOLVED | Noted: 2021-11-19 | Resolved: 2023-10-18

## 2023-10-18 PROCEDURE — 99214 OFFICE O/P EST MOD 30 MIN: CPT | Performed by: FAMILY MEDICINE
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
## 2023-10-19 ENCOUNTER — HOSPITAL ENCOUNTER (OUTPATIENT)
Dept: RADIOLOGY | Facility: HOSPITAL | Age: 53
End: 2023-10-19
Payer: COMMERCIAL

## 2023-10-19 ENCOUNTER — APPOINTMENT (OUTPATIENT)
Dept: LAB | Facility: HOSPITAL | Age: 53
End: 2023-10-19
Payer: COMMERCIAL

## 2023-10-19 DIAGNOSIS — E78.5 DYSLIPIDEMIA: ICD-10-CM

## 2023-10-19 DIAGNOSIS — I10 ESSENTIAL HYPERTENSION: ICD-10-CM

## 2023-10-19 DIAGNOSIS — M79.631 RIGHT FOREARM PAIN: ICD-10-CM

## 2023-10-19 DIAGNOSIS — R73.03 PREDIABETES: ICD-10-CM

## 2023-10-19 LAB
ALBUMIN SERPL BCP-MCNC: 4.4 G/DL (ref 3.5–5)
ALP SERPL-CCNC: 79 U/L (ref 34–104)
ALT SERPL W P-5'-P-CCNC: 36 U/L (ref 7–52)
ANION GAP SERPL CALCULATED.3IONS-SCNC: 7 MMOL/L
AST SERPL W P-5'-P-CCNC: 35 U/L (ref 13–39)
BILIRUB SERPL-MCNC: 0.35 MG/DL (ref 0.2–1)
BUN SERPL-MCNC: 17 MG/DL (ref 5–25)
CALCIUM SERPL-MCNC: 9.5 MG/DL (ref 8.4–10.2)
CHLORIDE SERPL-SCNC: 106 MMOL/L (ref 96–108)
CHOLEST SERPL-MCNC: 202 MG/DL
CO2 SERPL-SCNC: 29 MMOL/L (ref 21–32)
CREAT SERPL-MCNC: 0.79 MG/DL (ref 0.6–1.3)
CREAT UR-MCNC: 225.2 MG/DL
ERYTHROCYTE [DISTWIDTH] IN BLOOD BY AUTOMATED COUNT: 12.2 % (ref 11.6–15.1)
GFR SERPL CREATININE-BSD FRML MDRD: 85 ML/MIN/1.73SQ M
GLUCOSE P FAST SERPL-MCNC: 99 MG/DL (ref 65–99)
HCT VFR BLD AUTO: 37.3 % (ref 34.8–46.1)
HDLC SERPL-MCNC: 71 MG/DL
HGB BLD-MCNC: 12.2 G/DL (ref 11.5–15.4)
LDLC SERPL CALC-MCNC: 117 MG/DL (ref 0–100)
MCH RBC QN AUTO: 31 PG (ref 26.8–34.3)
MCHC RBC AUTO-ENTMCNC: 32.7 G/DL (ref 31.4–37.4)
MCV RBC AUTO: 95 FL (ref 82–98)
MICROALBUMIN UR-MCNC: 63.9 MG/L
MICROALBUMIN/CREAT 24H UR: 28 MG/G CREATININE (ref 0–30)
PLATELET # BLD AUTO: 146 THOUSANDS/UL (ref 149–390)
PMV BLD AUTO: 10.6 FL (ref 8.9–12.7)
POTASSIUM SERPL-SCNC: 3.9 MMOL/L (ref 3.5–5.3)
PROT SERPL-MCNC: 7.6 G/DL (ref 6.4–8.4)
RBC # BLD AUTO: 3.94 MILLION/UL (ref 3.81–5.12)
SODIUM SERPL-SCNC: 142 MMOL/L (ref 135–147)
TRIGL SERPL-MCNC: 69 MG/DL
WBC # BLD AUTO: 4.88 THOUSAND/UL (ref 4.31–10.16)

## 2023-10-19 PROCEDURE — 85027 COMPLETE CBC AUTOMATED: CPT

## 2023-10-19 PROCEDURE — 83036 HEMOGLOBIN GLYCOSYLATED A1C: CPT

## 2023-10-19 PROCEDURE — 82043 UR ALBUMIN QUANTITATIVE: CPT

## 2023-10-19 PROCEDURE — 80061 LIPID PANEL: CPT

## 2023-10-19 PROCEDURE — 36415 COLL VENOUS BLD VENIPUNCTURE: CPT

## 2023-10-19 PROCEDURE — 82570 ASSAY OF URINE CREATININE: CPT

## 2023-10-19 PROCEDURE — 80053 COMPREHEN METABOLIC PANEL: CPT

## 2023-10-19 PROCEDURE — 73090 X-RAY EXAM OF FOREARM: CPT

## 2023-10-20 LAB
EST. AVERAGE GLUCOSE BLD GHB EST-MCNC: 126 MG/DL
HBA1C MFR BLD: 6 %

## 2023-10-23 ENCOUNTER — TELEPHONE (OUTPATIENT)
Dept: FAMILY MEDICINE CLINIC | Facility: CLINIC | Age: 53
End: 2023-10-23

## 2023-10-23 DIAGNOSIS — D69.6 LOW PLATELET COUNT (HCC): Primary | ICD-10-CM

## 2023-10-23 NOTE — TELEPHONE ENCOUNTER
----- Message from Alejandro Luevano MD sent at 10/23/2023  7:48 AM EDT -----  Labs reviewed; A1C still in prediabetic range at 6, cholesterol did improve, kidney, liver and electrolytes within normal limits. No significant protein noted in the urine as well. Platelet count slightly low so would like to repeat in a few weeks; still awaiting XR of the forearm results.

## 2023-10-24 ENCOUNTER — VBI (OUTPATIENT)
Dept: ADMINISTRATIVE | Facility: OTHER | Age: 53
End: 2023-10-24

## 2023-10-30 ENCOUNTER — EVALUATION (OUTPATIENT)
Dept: PHYSICAL THERAPY | Facility: CLINIC | Age: 53
End: 2023-10-30
Payer: COMMERCIAL

## 2023-10-30 DIAGNOSIS — M79.631 RIGHT FOREARM PAIN: ICD-10-CM

## 2023-10-30 PROCEDURE — 97162 PT EVAL MOD COMPLEX 30 MIN: CPT | Performed by: PHYSICAL THERAPIST

## 2023-10-30 PROCEDURE — 97112 NEUROMUSCULAR REEDUCATION: CPT | Performed by: PHYSICAL THERAPIST
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
## 2023-11-07 ENCOUNTER — OFFICE VISIT (OUTPATIENT)
Dept: PHYSICAL THERAPY | Facility: CLINIC | Age: 53
End: 2023-11-07
Payer: COMMERCIAL

## 2023-11-07 DIAGNOSIS — M79.631 RIGHT FOREARM PAIN: Primary | ICD-10-CM

## 2023-11-07 PROCEDURE — 97112 NEUROMUSCULAR REEDUCATION: CPT

## 2023-11-07 PROCEDURE — 97110 THERAPEUTIC EXERCISES: CPT

## 2023-11-07 PROCEDURE — 97140 MANUAL THERAPY 1/> REGIONS: CPT

## 2023-11-07 NOTE — PROGRESS NOTES
Daily Note     Today's date: 2023  Patient name: Jesus Holloway  : 1970  MRN: [id number]  Referring provider: Miriam Cook MD  Dx:   Encounter Diagnosis     ICD-10-CM    1. Right forearm pain  M79.631                      Subjective: No changes noted. Arm is very sore. Objective: See treatment diary below     Precautions: none     Manuals 10/30 11/7           R extensor/ supinator mx work  [address] [place]           MFD & IASTM  [address] [facility] [place]           R radial mobs                          Neuro Re-Ed                                                                                                        Ther Ex             Wrist extension/supination isometrics r            Wrist extensor stretch r 10"x5           Doorway pec stretch r Palm down 20"x3                                                                                                      Modalities             MHP  15'                           Assessment: Tolerated treatment well. Patient demonstrated fatigue post treatment, exhibited good technique with therapeutic exercises, and would benefit from continued PT    Plan: Continue per plan of care. Progress treatment as tolerated.     Alexander Manual

## 2023-11-09 ENCOUNTER — OFFICE VISIT (OUTPATIENT)
Dept: PHYSICAL THERAPY | Facility: CLINIC | Age: 53
End: 2023-11-09
Payer: COMMERCIAL

## 2023-11-09 DIAGNOSIS — M79.631 RIGHT FOREARM PAIN: Primary | ICD-10-CM

## 2023-11-09 PROCEDURE — 97140 MANUAL THERAPY 1/> REGIONS: CPT

## 2023-11-09 PROCEDURE — 97110 THERAPEUTIC EXERCISES: CPT

## 2023-11-09 NOTE — PROGRESS NOTES
Daily Note     Today's date: 2023  Patient name: Jad Boyer  : 1970  MRN: [id number]  Referring provider: Maicol Cline MD  Dx:   Encounter Diagnosis     ICD-10-CM    1. Right forearm pain  M79.631                    Subjective: Improving forearm pain noted. Objective: See treatment diary below     Precautions: none     Manuals 10/30 11/7 11/9          R extensor/ supinator mx work  [address] [place] [address] [place]          MFD & IASTM  [address] [place] IASTM [address] [place]          R radial mobs                          Neuro Re-Ed                                      Ther Ex            Wrist extension/supination isometrics r  Red bar 3x20 ea          Wrist extensor stretch r 10"x5 20"x5          Doorway pec stretch r Palm down 20"x3 Palm down 30"x3                       Wrist extension & flexion   1# 2x20 ea                                                                           Modalities            MHP  15' 10'                          Assessment: Tolerated treatment well. Patient demonstrated fatigue post treatment, exhibited good technique with therapeutic exercises, and would benefit from continued PT    Plan: Continue per plan of care. Progress treatment as tolerated.     Lionel Chapin

## 2023-11-14 ENCOUNTER — OFFICE VISIT (OUTPATIENT)
Dept: PHYSICAL THERAPY | Facility: CLINIC | Age: 53
End: 2023-11-14
Payer: COMMERCIAL

## 2023-11-14 DIAGNOSIS — M79.631 RIGHT FOREARM PAIN: Primary | ICD-10-CM

## 2023-11-14 PROCEDURE — 97112 NEUROMUSCULAR REEDUCATION: CPT

## 2023-11-14 PROCEDURE — 97110 THERAPEUTIC EXERCISES: CPT

## 2023-11-14 PROCEDURE — 97140 MANUAL THERAPY 1/> REGIONS: CPT

## 2023-11-14 NOTE — PROGRESS NOTES
Daily Note     Today's date: 2023  Patient name: Mela Rodriguez  : 1970  MRN: [id number]  Referring provider: Manda Rivers MD  Dx:   Encounter Diagnosis     ICD-10-CM    1. Right forearm pain  M79.631                      Subjective: Pt notes biceps tightness and continues to have forearm pain with supination. Objective: See treatment diary below     Precautions: none     Manuals 10/30 11/7 11/9 11/14         R extensor/ supinator mx work  [address] [facility] [place] [address] [facility] [place] [address] [facility] [place]         MFD & IASTM  [address] [facility] [place] IASTM [address] [facility] [place] SH         R radial mobs                          Neuro Re-Ed                                     Ther Ex           Wrist extension/ supination iso r  Red bar 3x20 ea Grn bar  3x20          Wrist extensor stretch r 10"x5 20"x5 20"x5         Doorway pec stretch r Palm down 20"x3 Palm down 30"x3                       Wrist extension & flexion   1# 2x20 ea 2#  2x20 [facility] [employer] web ext    20x                                                Modalities           MHP  15' 10' 10'                         Assessment: Tolerated treatment well. Patient demonstrated fatigue post treatment, exhibited good technique with therapeutic exercises, and would benefit from continued PT    Plan: Continue per plan of care. Progress treatment as tolerated.     Tommi Crigler

## 2023-11-16 ENCOUNTER — OFFICE VISIT (OUTPATIENT)
Dept: PHYSICAL THERAPY | Facility: CLINIC | Age: 53
End: 2023-11-16
Payer: COMMERCIAL

## 2023-11-16 DIAGNOSIS — M79.631 RIGHT FOREARM PAIN: Primary | ICD-10-CM

## 2023-11-16 PROCEDURE — 97140 MANUAL THERAPY 1/> REGIONS: CPT

## 2023-11-16 PROCEDURE — 97110 THERAPEUTIC EXERCISES: CPT

## 2023-11-16 NOTE — PROGRESS NOTES
Daily Note     Today's date: 2023  Patient name: Deena Jane  : 1970  MRN: [id number]  Referring provider: Leydi Cavanaugh MD  Dx:   Encounter Diagnosis     ICD-10-CM    1. Right forearm pain  M79.631                      Subjective: Pt notes slight improvement in biceps pain following LV but had significant pain during manuals today, particularly IASTM. Objective: See treatment diary below     Precautions: none     Manuals 10/30 11/7 11/9 11/14 11/16        R extensor/ supinator mx work  [address] [place] [address] [place] [address] [place] [address] [place]        MFD & IASTM  [address] [place] IASTM [address] [place] SH SH IASTM        R radial mobs                          Neuro Re-Ed                                    Ther Ex          Wrist extension/ supination iso r  Red bar 3x20 ea Grn bar  3x20  Grn bar 2x20 ea        Wrist extensor stretch r 10"x5 20"x5 20"x5 20"x5        Doorway pec stretch r Palm down 20"x3 Palm down 30"x3                       Wrist extension & flexion   1# 2x20 ea 2#  2x20 ea 2# 20x [employer] web ext    20x2 20x3                                               Modalities          MH  15' 10' 10'                         Assessment: Tolerated treatment well. Patient demonstrated fatigue post treatment, exhibited good technique with therapeutic exercises, and would benefit from continued PT    Plan: Continue per plan of care. Progress treatment as tolerated.     Branden Lank

## 2023-11-21 ENCOUNTER — OFFICE VISIT (OUTPATIENT)
Dept: PHYSICAL THERAPY | Facility: CLINIC | Age: 53
End: 2023-11-21
Payer: COMMERCIAL

## 2023-11-21 DIAGNOSIS — M79.631 RIGHT FOREARM PAIN: Primary | ICD-10-CM

## 2023-11-21 PROCEDURE — 97110 THERAPEUTIC EXERCISES: CPT

## 2023-11-21 PROCEDURE — 97140 MANUAL THERAPY 1/> REGIONS: CPT

## 2023-11-21 NOTE — PROGRESS NOTES
Daily Note     Today's date: 2023  Patient name: Rebecca Quevedo  : 1970  MRN: [id number]  Referring provider: Sumit Sheppard MD  Dx:   Encounter Diagnosis     ICD-10-CM    1. Right forearm pain  M79.631                      Subjective: Supinator pain improved and reduced tightness noted. Biceps pain remains but is slightly better than last week. Objective: See treatment diary below     Precautions: none     Manuals 10/30 11/7 11/9 11/14 11/16 11/21       R extensor/ supinator mx work  [address] [place] [address] [place] [address] [place] [address] [place] [address] [place]       MFD & IASTM  [address] [place] IASTM [address] [place] [address] [place] SH IASTM SH  IASTM       R radial mobs                          Neuro Re-Ed                                   Ther Ex         Wrist extension/ supination iso r  Red bar 3x20 ea Grn bar  3x20  Grn bar 2x20 ea Grn bar  3x20       Wrist extensor stretch r 10"x5 20"x5 20"x5 20"x5 20"x5       Doorway pec stretch r Palm down 20"x3 Palm down 30"x3          Hammer curls      2# 2x20       Wrist extension & flexion   1# 2x20 ea 2#  2x20 ea 2# 20x ea 2#  3x20 [employer] web ext    20x2 20x3 3x20                                              Modalities         MHP  15' 10' 10'  15'                       Assessment: Tolerated treatment well. Patient demonstrated fatigue post treatment, exhibited good technique with therapeutic exercises, and would benefit from continued PT    Plan: Continue per plan of care. Progress treatment as tolerated.     Soledad Stevens No pertinent past medical history <<----- Click to add NO pertinent Past Medical History

## 2023-11-28 ENCOUNTER — OFFICE VISIT (OUTPATIENT)
Dept: PHYSICAL THERAPY | Facility: CLINIC | Age: 53
End: 2023-11-28
Payer: COMMERCIAL

## 2023-11-28 DIAGNOSIS — M79.631 RIGHT FOREARM PAIN: Primary | ICD-10-CM

## 2023-11-28 PROCEDURE — 97110 THERAPEUTIC EXERCISES: CPT

## 2023-11-28 PROCEDURE — 97140 MANUAL THERAPY 1/> REGIONS: CPT

## 2023-11-28 NOTE — PROGRESS NOTES
Daily Note     Today's date: 2023  Patient name: Jesus Holloway  : 1970  MRN: [id number]  Referring provider: Miriam Cook MD  Dx:   Encounter Diagnosis     ICD-10-CM    1. Right forearm pain  M79.631                      Subjective: Pt notes decrease in biceps and supinator pain since LV but notes bilateral epicondyle pain that is TTP. Objective: See treatment diary below     Precautions: none     Manuals 10/30 11/7 11/9 11/14 11/16 11/21 11/28      R extensor/ supinator mx work  [address] [place] [address] [place] [address] [place] [address] [place] [address] [place] [address] [place]      MFD & IASTM  [address] [place] IASTM [address] [place] SH SH IASTM SH  IASTM SH IASTM      R radial mobs                          Neuro Re-Ed                                  Ther Ex        Wrist extension/ supination iso r  Red bar 3x20 ea Grn bar  3x20  Grn bar 2x20 ea Grn bar  3x20 Grn bar 2x20 ea      Wrist extensor stretch r 10"x5 20"x5 20"x5 20"x5 20"x5 & flexion 20"x5 ea      Doorway pec stretch r Palm down 20"x3 Palm down 30"x3    Palm down 30"x3      Hammer curls      2# 2x20 3# 2x20      Wrist extension & flexion   1# 2x20 ea 2#  2x20 ea 2# 20x ea 2#  3x20 ea 3#  2x20 [employer] ext    [employer] 20x2 Green 20x3 Green 3x20 Blue 2x20                                             Modalities        MHP  15' 10' 10'  15' 10'                      Assessment: Tolerated treatment well. Patient demonstrated fatigue post treatment, exhibited good technique with therapeutic exercises, and would benefit from continued PT    Plan: Continue per plan of care. Progress treatment as tolerated.     Alexander Momin

## 2023-11-30 ENCOUNTER — OFFICE VISIT (OUTPATIENT)
Dept: PHYSICAL THERAPY | Facility: CLINIC | Age: 53
End: 2023-11-30
Payer: COMMERCIAL

## 2023-11-30 DIAGNOSIS — M79.631 RIGHT FOREARM PAIN: Primary | ICD-10-CM

## 2023-11-30 PROCEDURE — 97140 MANUAL THERAPY 1/> REGIONS: CPT

## 2023-11-30 PROCEDURE — 97110 THERAPEUTIC EXERCISES: CPT

## 2023-11-30 NOTE — PROGRESS NOTES
Daily Note     Today's date: 2023  Patient name: Devin Ribera  : 1970  MRN: [id number]  Referring provider: Umu Olivia MD  Dx:   Encounter Diagnosis     ICD-10-CM    1. Right forearm pain  M79.631                      Subjective: B/l epicondyle pain remains the same with increase soreness in supinator and brachialis. Objective: See treatment diary below     Precautions: none     Manuals 10/30 11/7 11/9 11/14 11/16 11/21 11/28 11/30     R extensor/ supinator mx work  [address] [place] [address] [place] [address] [place] [address] [place] [address] [place] [address] [place] [address] [place]     MFD & IASTM  [address] [place] IASTM SH SH SH IASTM SH  IASTM SH IASTM SH MFD     R radial mobs                          Neuro Re-Ed                                 Ther Ex       Wrist extension/ supination iso r  Red bar 3x20 ea Grn bar  3x20  Grn bar 2x20 ea Grn bar  3x20 Grn bar 2x20 ea Grn bar 2x20 ea     Wrist extensor stretch r 10"x5 20"x5 20"x5 20"x5 20"x5 & flexion 20"x5 ea & flexion 20"x5 ea     Doorway pec stretch r Palm down 20"x3 Palm down 30"x3    Palm down 30"x3 Palm down 30"x3     Hammer curls      2# 2x20 3# 2x20 3# 2x20     Wrist extension & flexion   1# 2x20 ea 2#  2x20 ea 2# 20x ea 2#  3x20 ea 3#  2x20 ea 3#  2x20 [facility] ext    [employer] 20x2 Green 20x3 Green 3x20 Blue 2x20 Blue 2x20                                            Modalities       MH  15' 10' 10'  15' 10' 10'                     Assessment: Tolerated treatment well. Patient demonstrated fatigue post treatment, exhibited good technique with therapeutic exercises, and would benefit from continued PT    Plan: Continue per plan of care. Progress treatment as tolerated.     Daiana Angulo

## 2023-12-01 ENCOUNTER — OFFICE VISIT (OUTPATIENT)
Dept: FAMILY MEDICINE CLINIC | Facility: CLINIC | Age: 53
End: 2023-12-01
Payer: COMMERCIAL

## 2023-12-01 VITALS
DIASTOLIC BLOOD PRESSURE: 78 MMHG | OXYGEN SATURATION: 97 % | WEIGHT: 122.2 LBS | HEART RATE: 57 BPM | TEMPERATURE: 97.5 F | HEIGHT: 61 IN | BODY MASS INDEX: 23.07 KG/M2 | SYSTOLIC BLOOD PRESSURE: 120 MMHG

## 2023-12-01 DIAGNOSIS — G51.32 HEMIFACIAL SPASM OF LEFT SIDE OF FACE: Primary | ICD-10-CM

## 2023-12-01 PROCEDURE — 99213 OFFICE O/P EST LOW 20 MIN: CPT | Performed by: FAMILY MEDICINE

## 2023-12-05 ENCOUNTER — OFFICE VISIT (OUTPATIENT)
Dept: PHYSICAL THERAPY | Facility: CLINIC | Age: 53
End: 2023-12-05
Payer: COMMERCIAL

## 2023-12-05 DIAGNOSIS — M79.631 RIGHT FOREARM PAIN: Primary | ICD-10-CM

## 2023-12-05 DIAGNOSIS — I10 ESSENTIAL HYPERTENSION: ICD-10-CM

## 2023-12-05 PROCEDURE — 97010 HOT OR COLD PACKS THERAPY: CPT

## 2023-12-05 PROCEDURE — 97140 MANUAL THERAPY 1/> REGIONS: CPT

## 2023-12-05 PROCEDURE — 97110 THERAPEUTIC EXERCISES: CPT

## 2023-12-05 RX ORDER — AMLODIPINE BESYLATE 5 MG/1
5 TABLET ORAL DAILY
Qty: 90 TABLET | Refills: 0 | Status: SHIPPED | OUTPATIENT
Start: 2023-12-05

## 2023-12-05 NOTE — PROGRESS NOTES
Daily Note     Today's date: 2023  Patient name: Alcides Suazo  : 1970  MRN: [id number]  Referring provider: Kayley Landry MD  Dx:   Encounter Diagnosis     ICD-10-CM    1. Right forearm pain  M79.631                      Subjective: Continues feeling pain in right arm with activities. Objective: See treatment diary below      Assessment: Tolerated treatment well. Improved symptoms following session with decreased soreness during palpation and with resisted 3rd finger ext and wrist ext. Targeted eccentric strengthening with wrist extension and encouraged patient to add this progression. Emphasized activity modification to lift with palms up and avoid repetitive gripping to give common extensor a break. Patient would benefit from continued PT to address remaining symptoms and deficits. Plan: Progress treatment as tolerated.        Precautions: none     Manuals 10/30 11/7 11/9 11/14 11/16 11/21 11/28 11/30 12/5    R extensor/ supinator mx work  [address] [place] [address] [place] [address] [address] [address] [place] [address] [address] [place] CS    MFD & IASTM  SH IASTM SH SH SH IASTM SH  IASTM SH IASTM SH MFD CS    R radial mobs                          Neuro Re-Ed                                 Ther Ex       Wrist extension/ supination iso r  Red bar 3x20 ea Grn bar  3x20  Grn bar 2x20 ea Grn bar  3x20 Grn bar 2x20 ea Grn bar 2x20 ea     Wrist extensor stretch r 10"x5 20"x5 20"x5 20"x5 20"x5 & flexion 20"x5 ea & flexion 20"x5 ea & flexion 20"x5 ea    Doorway pec stretch r Palm down 20"x3 Palm down 30"x3    Palm down 30"x3 Palm down 30"x3 Palm down 30"x3    Hammer curls      2# 2x20 3# 2x20 3# 2x20     Wrist extension & flexion   1# 2x20 ea 2#  2x20 ea 2# 20x ea 2#  3x20 ea 3#  2x20 ea 3#  2x20 ea 3# 3x10 ext slow ecc                 Web ext    [employer] 20x2 Green 20x3 Green 3x20 Blue 2x20 Blue 2x20 Blue 2x20                                           Modalities   11/30 12/5    [facility]  15' 10' 10'  15' 10' 10' 10'

## 2023-12-07 ENCOUNTER — APPOINTMENT (OUTPATIENT)
Dept: PHYSICAL THERAPY | Facility: CLINIC | Age: 53
End: 2023-12-07
Payer: COMMERCIAL

## 2023-12-12 ENCOUNTER — OFFICE VISIT (OUTPATIENT)
Dept: PHYSICAL THERAPY | Facility: CLINIC | Age: 53
End: 2023-12-12
Payer: COMMERCIAL

## 2023-12-12 DIAGNOSIS — M79.631 RIGHT FOREARM PAIN: Primary | ICD-10-CM

## 2023-12-12 PROCEDURE — 97140 MANUAL THERAPY 1/> REGIONS: CPT

## 2023-12-12 PROCEDURE — 97112 NEUROMUSCULAR REEDUCATION: CPT

## 2023-12-12 PROCEDURE — 97110 THERAPEUTIC EXERCISES: CPT

## 2023-12-12 NOTE — PROGRESS NOTES
Daily Note     Today's date: 2023  Patient name: Jad Boyer  : 1970  MRN: [id number]  Referring provider: Maicol Cline MD  Dx:   Encounter Diagnosis     ICD-10-CM    1. Right forearm pain  M79.631                      Subjective: Improving supination and biceps curls. Continues to have pain in med & lat epicondyles. Objective: See treatment diary below     Precautions: none     Manuals 10/30 11/7 11/9 11/14 11/16 11/21 11/28 11/30 12/5 12/12   R extensor/ supinator mx work  [address] [place] [address] [place] [address] [place] [address] [place] [address] [place] [address] [place] [address] [place] [address] [place]   MFD & IASTM  [address] [place] IASTM [address] [place] SH SH IASTM SH  IASTM SH IASTM SH MFD CS    R radial mobs                          Neuro Re-Ed     Supinateddo radial/ulnar deviation          HEP                Ther Ex     Wrist extension/ supination iso r  Red bar 3x20 ea Grn bar  3x20  Grn bar 2x20 ea Grn bar  3x20 Grn bar 2x20 ea Grn bar 2x20 ea  Grn bar 2x20 ea   Wrist extensor stretch r 10"x5 20"x5 20"x5 20"x5 20"x5 & flexion 20"x5 ea & flexion 20"x5 ea & flexion 20"x5 ea & flexion 20"x5 ea   Doorway pec stretch r Palm down 20"x3 Palm down 30"x3    Palm down 30"x3 Palm down 30"x3 Palm down 30"x3 Palm down 30"x3   Hammer curls      2# 2x20 3# 2x20 3# 2x20  3# 3x10   Wrist extension & flexion   1# 2x20 ea 2#  2x20 ea 2# 20x ea 2#  3x20 ea 3#  2x20 ea 3#  2x20 ea 3# 3x10 ext slow ecc 3# 3x10 ext slow ecc                Web ext    Green 20x2 Green 20x3 Green 3x20 Blue 2x20 Blue 2x20 Blue 2x20    Doorway triceps stretch          20"x5                             Modalities     [facility]  15' 10' 10'  15' 10' 10' 10' 10'                   Assessment: Tolerated treatment well. Patient demonstrated fatigue post treatment, exhibited good technique with therapeutic exercises, and would benefit from continued PT    Plan: Continue per plan of care. Progress treatment as tolerated.      [facility] Kelly Kahn

## 2023-12-14 ENCOUNTER — OFFICE VISIT (OUTPATIENT)
Dept: PHYSICAL THERAPY | Facility: CLINIC | Age: 53
End: 2023-12-14
Payer: COMMERCIAL

## 2023-12-14 DIAGNOSIS — M79.631 RIGHT FOREARM PAIN: Primary | ICD-10-CM

## 2023-12-14 PROCEDURE — 97110 THERAPEUTIC EXERCISES: CPT

## 2023-12-14 PROCEDURE — 97112 NEUROMUSCULAR REEDUCATION: CPT

## 2023-12-14 PROCEDURE — 97140 MANUAL THERAPY 1/> REGIONS: CPT

## 2023-12-14 NOTE — PROGRESS NOTES
Daily Note     Today's date: 2023  Patient name: Jay Cr  : 1970  MRN: [id number]  Referring provider: Rupali Schumacher MD  Dx:   Encounter Diagnosis     ICD-10-CM    1. Right forearm pain  M79.631                      Subjective: Pt reports 40-50% improved. Objective: See treatment diary below     Precautions: none     Manuals      R extensor/ supinator mx work [facility] CS [facility]     MFD & IASTM SH IASTM SH  IASTM SH IASTM SH MFD CS  SH IASTM     R radial mobs                        Neuro Re-Ed      Supinateddo radial/ulnar deviation      HEP      3-way [employer]       10"x5 ea     Ther Ex      Wrist extension/ supination iso Grn bar 2x20 ea Grn bar  3x20 Grn bar 2x20 ea Grn bar 2x20 ea  Grn bar 2x20 ea Grn bar 2x20 ea     Wrist extensor stretch 20"x5 20"x5 & flexion 20"x5 ea & flexion 20"x5 ea & flexion 20"x5 ea & flexion 20"x5 ea & flexion 20"x5 ea     Doorway pec stretch   Palm down 30"x3 Palm down 30"x3 Palm down 30"x3 Palm down 30"x3 Palm down 30"x3     Hammer curls  2# 2x20 3# 2x20 3# 2x20  3# 3x10 4# 3x10     Wrist extension & flexion 2# 20x ea 2#  3x20 ea 3#  2x20 ea 3#  2x20 ea 3# 3x10 ext slow ecc 3# 3x10 ext slow ecc 3# 3x10 ext slow ecc                 Web ext Green 20x3 Green 3x20 Blue 2x20 Blue 2x20 Blue 2x20       Doorway triceps stretch      20"x5 20"x5                             Modalities      MHP  15' 10' 10' 10' 10' 10'                    Assessment: Tolerated treatment well. Patient demonstrated fatigue post treatment, exhibited good technique with therapeutic exercises, and would benefit from continued PT    Plan: Continue per plan of care. Progress treatment as tolerated.     Julio Cesar Delcid

## 2023-12-19 ENCOUNTER — APPOINTMENT (OUTPATIENT)
Dept: PHYSICAL THERAPY | Facility: CLINIC | Age: 53
End: 2023-12-19
Payer: COMMERCIAL

## 2023-12-21 ENCOUNTER — APPOINTMENT (OUTPATIENT)
Dept: PHYSICAL THERAPY | Facility: CLINIC | Age: 53
End: 2023-12-21
Payer: COMMERCIAL

## 2024-01-30 ENCOUNTER — EVALUATION (OUTPATIENT)
Dept: PHYSICAL THERAPY | Facility: CLINIC | Age: 54
End: 2024-01-30
Payer: COMMERCIAL

## 2024-01-30 DIAGNOSIS — M77.11 LATERAL EPICONDYLITIS, RIGHT ELBOW: ICD-10-CM

## 2024-01-30 DIAGNOSIS — M25.521 RIGHT ELBOW PAIN: Primary | ICD-10-CM

## 2024-01-30 PROCEDURE — 97161 PT EVAL LOW COMPLEX 20 MIN: CPT

## 2024-01-30 PROCEDURE — 97110 THERAPEUTIC EXERCISES: CPT
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
## 2024-02-01 ENCOUNTER — OFFICE VISIT (OUTPATIENT)
Dept: PHYSICAL THERAPY | Facility: CLINIC | Age: 54
End: 2024-02-01
Payer: COMMERCIAL

## 2024-02-01 DIAGNOSIS — M77.11 LATERAL EPICONDYLITIS, RIGHT ELBOW: ICD-10-CM

## 2024-02-01 DIAGNOSIS — M25.521 RIGHT ELBOW PAIN: Primary | ICD-10-CM

## 2024-02-01 PROCEDURE — 97110 THERAPEUTIC EXERCISES: CPT

## 2024-02-01 PROCEDURE — 97140 MANUAL THERAPY 1/> REGIONS: CPT

## 2024-02-01 NOTE — PROGRESS NOTES
"Daily Note     Today's date: 2024  Patient name: Aurora Antoine  : 1970  MRN: [id number]  Referring provider: Candy Alonso MD  Dx:   Encounter Diagnosis     ICD-10-CM    1. Right elbow pain  M25.521       2. Lateral epicondylitis, right elbow  M77.11                      Subjective: Patient reports elbow feeling better today compared to previous session, think the joint is feeling better from the mobs.       Objective: See treatment diary below      Assessment: Tolerated treatment well. Patient demonstrated favorable response to manual therapy with decrease in symptoms following lateral epicondyle MWM and radial head mobs. Able to perform 5# dumbbell curl with decreased symptom intensity. Able to progress with resistance during UE exercises without symptom provocation. Patient would benefit from continued PT for improved functional tolerance.       Plan: Progress treatment as tolerated.       Precautions: none     Manuals    R extensor/ supinator mx work SH SH SH SH CS SH SH     MFD & IASTM SH IASTM SH  IASTM SH IASTM SH MFD CS  SH IASTM     R radial mobs            Lateral epicondyle MWM        CS CS   Radial head mobs         CS   Reassess        CS    Neuro Re-Ed      Supinateddo radial/ulnar deviation      HEP      3-way Bodyblade       10\"x5 ea     Ther Ex     Wrist extension/ supination iso Grn bar 2x20 ea Grn bar  3x20 Grn bar 2x20 ea Grn bar 2x20 ea  Grn bar 2x20 ea Grn bar 2x20 ea Grn bar x20 ea Grn bar x20 ea   Wrist extensor stretch 20\"x5 20\"x5 & flexion 20\"x5 ea & flexion 20\"x5 ea & flexion 20\"x5 ea & flexion 20\"x5 ea & flexion 20\"x5 ea 20\"x5 and flexion  20\"x2 flex   Doorway pec stretch   Palm down 30\"x3 Palm down 30\"x3 Palm down 30\"x3 Palm down 30\"x3 Palm down 30\"x3 Palm down 30\"x3 Palm down 30\"x3   Hammer curls  2# 2x20 3# 2x20 3# 2x20  3# 3x10 4# 3x10 3# 2x10 4# " "3x10   Wrist extension & flexion 2# 20x ea 2#  3x20 ea 3#  2x20 ea 3#  2x20 ea 3# 3x10 ext slow ecc 3# 3x10 ext slow ecc 3# 3x10 ext slow ecc 2# 2x10 ext slow ecc 3# 2x10 ext slow ecc               Web ext Green 20x3 Green 3x20 Blue 2x20 Blue 2x20 Blue 2x20       Doorway triceps stretch      20\"x5 20\"x5 20\"x5 20\"x5                           Modalities 11/16 11/21 11/28 11/30 12/5 12/12 12/14 1/30 2/1   [facility]  15' 10' 10' 10' 10' 10' 10' 10'                        "

## 2024-02-06 ENCOUNTER — TELEPHONE (OUTPATIENT)
Dept: FAMILY MEDICINE CLINIC | Facility: CLINIC | Age: 54
End: 2024-02-06

## 2024-02-06 ENCOUNTER — OFFICE VISIT (OUTPATIENT)
Dept: PHYSICAL THERAPY | Facility: CLINIC | Age: 54
End: 2024-02-06
Payer: COMMERCIAL

## 2024-02-06 DIAGNOSIS — M25.521 RIGHT ELBOW PAIN: Primary | ICD-10-CM

## 2024-02-06 DIAGNOSIS — M77.11 LATERAL EPICONDYLITIS, RIGHT ELBOW: ICD-10-CM

## 2024-02-06 PROCEDURE — 97140 MANUAL THERAPY 1/> REGIONS: CPT

## 2024-02-06 PROCEDURE — 97110 THERAPEUTIC EXERCISES: CPT

## 2024-02-06 NOTE — TELEPHONE ENCOUNTER
Patient came in asking if you could put in blood work for STD testing, looking to get tested for every one you can get tested for. Please advise

## 2024-02-06 NOTE — PROGRESS NOTES
"Daily Note     Today's date: 2024  Patient name: Aurora Antoine  : 1970  MRN: [id number]  Referring provider: Candy Alonso MD  Dx:   Encounter Diagnosis     ICD-10-CM    1. Right elbow pain  M25.521       2. Lateral epicondylitis, right elbow  M77.11                      Subjective: Patient reports about 90% improvement now. Went to gym to workout twice this week without symptoms, feels right bicep is more flexed than left.       Objective: See treatment diary below      Assessment: Tolerated treatment well. Patient demonstrated improvement in elbow flexion with radial head mobs, complete resolution of symptoms with elbow flexion following STM of right bicep. Able to progress with resistance during UE strengthening. Appropriate for periscapular strength next visit to progress tolerance to function. Patient would benefit from continued PT to address remaining deficits.       Plan: Progress treatment as tolerated.       Precautions: none     Manuals  26   R extensor/ supinator mx work SH SH SH SH CS SH SH      MFD & IASTM SH IASTM SH  IASTM SH IASTM SH MFD CS  SH IASTM      R radial mobs             Lateral epicondyle MWM        CS CS CS   Radial head mobs         CS CS   Reassess        CS     STM right bicep          CS   Neuro Re-Ed       Supinateddo radial/ulnar deviation      HEP       3-way Bodyblade       10\"x5 ea      Scap 4          Black rows and low rows, 10x3\" ea   Ther Ex      Wrist extension/ supination iso Grn bar 2x20 ea Grn bar  3x20 Grn bar 2x20 ea Grn bar 2x20 ea  Grn bar 2x20 ea Grn bar 2x20 ea Grn bar x20 ea Grn bar x20 ea Grn bar 2x20 ea   Wrist extensor stretch 20\"x5 20\"x5 & flexion 20\"x5 ea & flexion 20\"x5 ea & flexion 20\"x5 ea & flexion 20\"x5 ea & flexion 20\"x5 ea 20\"x5 and flexion  20\"x2 flex 3x30\" flex   Doorway pec stretch   Palm down 30\"x3 Palm down " "30\"x3 Palm down 30\"x3 Palm down 30\"x3 Palm down 30\"x3 Palm down 30\"x3 Palm down 30\"x3 Palm down 30x3\"   Hammer curls  2# 2x20 3# 2x20 3# 2x20  3# 3x10 4# 3x10 3# 2x10 4# 3x10 5# 3x10   Wrist extension & flexion 2# 20x ea 2#  3x20 ea 3#  2x20 ea 3#  2x20 ea 3# 3x10 ext slow ecc 3# 3x10 ext slow ecc 3# 3x10 ext slow ecc 2# 2x10 ext slow ecc 3# 2x10 ext slow ecc 4# 2x10 ext slow ecc                Web ext Green 20x3 Green 3x20 Blue 2x20 Blue 2x20 Blue 2x20        Doorway triceps stretch      20\"x5 20\"x5 20\"x5 20\"x5 20\"x3                             Modalities 11/16 11/21 11/28 11/30 12/5 12/12 12/14 1/30 2/1 2/6   [facility]  15' 10' 10' 10' 10' 10' 10' 10' 8'                           "

## 2024-02-07 ENCOUNTER — LAB (OUTPATIENT)
Dept: LAB | Facility: HOSPITAL | Age: 54
End: 2024-02-07
Payer: COMMERCIAL

## 2024-02-07 DIAGNOSIS — Z11.3 SCREENING EXAMINATION FOR STD (SEXUALLY TRANSMITTED DISEASE): ICD-10-CM

## 2024-02-07 DIAGNOSIS — D69.6 LOW PLATELET COUNT (HCC): ICD-10-CM

## 2024-02-07 DIAGNOSIS — Z11.3 SCREENING EXAMINATION FOR STD (SEXUALLY TRANSMITTED DISEASE): Primary | ICD-10-CM

## 2024-02-07 LAB
ERYTHROCYTE [DISTWIDTH] IN BLOOD BY AUTOMATED COUNT: 13.2 % (ref 11.6–15.1)
HAV IGM SER QL: NORMAL
HBV CORE IGM SER QL: NORMAL
HBV SURFACE AG SER QL: NORMAL
HCT VFR BLD AUTO: 36.3 % (ref 34.8–46.1)
HCV AB SER QL: NORMAL
HGB BLD-MCNC: 11.7 G/DL (ref 11.5–15.4)
MCH RBC QN AUTO: 30.9 PG (ref 26.8–34.3)
MCHC RBC AUTO-ENTMCNC: 32.2 G/DL (ref 31.4–37.4)
MCV RBC AUTO: 96 FL (ref 82–98)
PLATELET # BLD AUTO: 171 THOUSANDS/UL (ref 149–390)
PMV BLD AUTO: 10.4 FL (ref 8.9–12.7)
RBC # BLD AUTO: 3.79 MILLION/UL (ref 3.81–5.12)
TREPONEMA PALLIDUM IGG+IGM AB [PRESENCE] IN SERUM OR PLASMA BY IMMUNOASSAY: NORMAL
WBC # BLD AUTO: 4.26 THOUSAND/UL (ref 4.31–10.16)

## 2024-02-07 PROCEDURE — 86780 TREPONEMA PALLIDUM: CPT

## 2024-02-07 PROCEDURE — 80074 ACUTE HEPATITIS PANEL: CPT

## 2024-02-07 PROCEDURE — 87390 HIV-1 AG IA: CPT

## 2024-02-07 PROCEDURE — 87491 CHLMYD TRACH DNA AMP PROBE: CPT

## 2024-02-07 PROCEDURE — 85027 COMPLETE CBC AUTOMATED: CPT

## 2024-02-07 PROCEDURE — 36415 COLL VENOUS BLD VENIPUNCTURE: CPT

## 2024-02-07 PROCEDURE — 86701 HIV-1ANTIBODY: CPT

## 2024-02-07 PROCEDURE — 87591 N.GONORRHOEAE DNA AMP PROB: CPT

## 2024-02-07 PROCEDURE — 86702 HIV-2 ANTIBODY: CPT

## 2024-02-07 PROCEDURE — 87389 HIV-1 AG W/HIV-1&-2 AB AG IA: CPT

## 2024-02-08 ENCOUNTER — SEXUAL HEALTH (OUTPATIENT)
Dept: SURGERY | Facility: CLINIC | Age: 54
End: 2024-02-08

## 2024-02-08 DIAGNOSIS — Z71.2 ENCOUNTER TO DISCUSS TEST RESULTS: Primary | ICD-10-CM

## 2024-02-08 LAB
C TRACH DNA SPEC QL NAA+PROBE: NEGATIVE
HIV 1+2 AB+HIV1 P24 AG SERPL QL IA: REACTIVE
HIV 2 AB SERPL QL IA: ABNORMAL
HIV1 AB SERPL QL IA: REACTIVE
HIV1 P24 AG SERPL QL IA: ABNORMAL
N GONORRHOEA DNA SPEC QL NAA+PROBE: NEGATIVE

## 2024-02-08 NOTE — PROGRESS NOTES
Patient presented to clinic to discuss recent HIV 1/HIV 2 Antigen/Antibody test result. Awaiting Multispot test result to discuss further.   Patient agreeable and acknowledged understanding.

## 2024-02-13 ENCOUNTER — OFFICE VISIT (OUTPATIENT)
Dept: PHYSICAL THERAPY | Facility: CLINIC | Age: 54
End: 2024-02-13
Payer: COMMERCIAL

## 2024-02-13 ENCOUNTER — TELEPHONE (OUTPATIENT)
Dept: FAMILY MEDICINE CLINIC | Facility: CLINIC | Age: 54
End: 2024-02-13

## 2024-02-13 DIAGNOSIS — M77.11 LATERAL EPICONDYLITIS, RIGHT ELBOW: ICD-10-CM

## 2024-02-13 DIAGNOSIS — M25.521 RIGHT ELBOW PAIN: Primary | ICD-10-CM

## 2024-02-13 PROCEDURE — 97140 MANUAL THERAPY 1/> REGIONS: CPT

## 2024-02-13 PROCEDURE — 97110 THERAPEUTIC EXERCISES: CPT

## 2024-02-13 PROCEDURE — 97112 NEUROMUSCULAR REEDUCATION: CPT

## 2024-02-13 NOTE — PROGRESS NOTES
"Daily Note     Today's date: 2024  Patient name: Aurora Antoine  : 1970  MRN: [id number]  Referring provider: Candy Alonso MD  Dx:   Encounter Diagnosis     ICD-10-CM    1. Right elbow pain  M25.521       2. Lateral epicondylitis, right elbow  M77.11                      Subjective: Patient reports feeling 93% better.       Objective: See treatment diary below      Assessment: Tolerated treatment well. Patient demonstrated improvement in performing DB bicep curl following manual therapy. Able to perform exercises with increased resistance. Addition of periscapular strengthening for improved mobility distal. Patient would benefit from continued PT to improve functional tolerance.       Plan: Progress treatment as tolerated.       Precautions: none     Manuals    R extensor/ supinator mx work CS SH SH       MFD & IASTM CS  SH IASTM       R radial mobs          Lateral epicondyle MWM    CS CS CS    Radial head mobs     CS CS CS   Reassess    CS      STM right bicep      CS    Neuro Re-Ed         Supinateddo radial/ulnar deviation  HEP        3-way Bodyblade   10\"x5 ea       Scap 4      Black rows and low rows, 10x3\" ea 15# rows, low rows   Alt shoulder taps modified plantigrade       2x20 alt   Standing ITY       2x10 3# YT   Ther Ex        Wrist extension/ supination iso  Grn bar 2x20 ea Grn bar 2x20 ea Grn bar x20 ea Grn bar x20 ea Grn bar 2x20 ea Grn bar 2x20 ea   Wrist extensor stretch & flexion 20\"x5 ea & flexion 20\"x5 ea & flexion 20\"x5 ea 20\"x5 and flexion  20\"x2 flex 3x30\" flex 3x30\" flex   Doorway pec stretch Palm down 30\"x3 Palm down 30\"x3 Palm down 30\"x3 Palm down 30\"x3 Palm down 30\"x3 Palm down 30x3\" Palm down 3x30\"   Hammer curls  3# 3x10 4# 3x10 3# 2x10 4# 3x10 5# 3x10 7# 3x10   Wrist extension & flexion 3# 3x10 ext slow ecc 3# 3x10 ext slow ecc 3# 3x10 ext slow ecc 2# 2x10 ext slow ecc 3# 2x10 ext slow ecc 4# 2x10 ext slow ecc 5# " "2x10 ext slow ecc             Web ext Blue 2x20         Doorway triceps stretch  20\"x5 20\"x5 20\"x5 20\"x5 20\"x3 20x3\"                       Modalities 12/5 12/12 12/14 1/30 2/1 2/6 2/13   P 10' 10' 10' 10' 10' 8' 8'                          "

## 2024-02-13 NOTE — TELEPHONE ENCOUNTER
----- Message from Candy Alonso MD sent at 2/12/2024 11:52 AM EST -----  HIV antibody testing came back reactive however this could be a false positive, I am still waiting for confirmatory testing and as soon as I get the results I will be in touch. Rest of the STD testing came back negative

## 2024-02-15 ENCOUNTER — SEXUAL HEALTH (OUTPATIENT)
Dept: SURGERY | Facility: CLINIC | Age: 54
End: 2024-02-15
Payer: COMMERCIAL

## 2024-02-15 DIAGNOSIS — Z01.89 ENCOUNTER FOR ROUTINE LABORATORY TESTING: Primary | ICD-10-CM

## 2024-02-15 PROCEDURE — 99211 OFF/OP EST MAY X REQ PHY/QHP: CPT

## 2024-02-16 ENCOUNTER — DOCUMENTATION (OUTPATIENT)
Dept: SURGERY | Facility: CLINIC | Age: 54
End: 2024-02-16

## 2024-02-20 ENCOUNTER — APPOINTMENT (OUTPATIENT)
Dept: PHYSICAL THERAPY | Facility: CLINIC | Age: 54
End: 2024-02-20
Payer: COMMERCIAL

## 2024-02-20 NOTE — PROGRESS NOTES
"Daily Note     Today's date: 2024  Patient name: Aurora Antoine  : 1970  MRN: [id number]  Referring provider: Candy Alonso MD  Dx: No diagnosis found.               Subjective: ***      Objective: See treatment diary below      Assessment: Tolerated treatment {Tolerated treatment :}. Patient {assessment:}      Plan: {PLAN:}     Precautions: none     Manuals    R extensor/ supinator mx work CS SH SH        MFD & IASTM CS  SH IASTM        R radial mobs           Lateral epicondyle MWM    CS CS CS     Radial head mobs     CS CS CS    Reassess    CS       STM right bicep      CS     Neuro Re-Ed          Supinateddo radial/ulnar deviation  HEP         3-way Bodyblade   10\"x5 ea        Scap 4      Black rows and low rows, 10x3\" ea 15# rows, low rows    Alt shoulder taps modified plantigrade       2x20 alt    Standing ITY       2x10 3# YT    Ther Ex         Wrist extension/ supination iso  Grn bar 2x20 ea Grn bar 2x20 ea Grn bar x20 ea Grn bar x20 ea Grn bar 2x20 ea Grn bar 2x20 ea    Wrist extensor stretch & flexion 20\"x5 ea & flexion 20\"x5 ea & flexion 20\"x5 ea 20\"x5 and flexion  20\"x2 flex 3x30\" flex 3x30\" flex    Doorway pec stretch Palm down 30\"x3 Palm down 30\"x3 Palm down 30\"x3 Palm down 30\"x3 Palm down 30\"x3 Palm down 30x3\" Palm down 3x30\"    Hammer curls  3# 3x10 4# 3x10 3# 2x10 4# 3x10 5# 3x10 7# 3x10    Wrist extension & flexion 3# 3x10 ext slow ecc 3# 3x10 ext slow ecc 3# 3x10 ext slow ecc 2# 2x10 ext slow ecc 3# 2x10 ext slow ecc 4# 2x10 ext slow ecc 5# 2x10 ext slow ecc               Web ext Blue 2x20          Doorway triceps stretch  20\"x5 20\"x5 20\"x5 20\"x5 20\"x3 20x3\"                          Modalities     [facility] 10' 10' 10' 10' 10' 8' 8'                              "

## 2024-02-21 ENCOUNTER — TELEPHONE (OUTPATIENT)
Dept: SURGERY | Facility: CLINIC | Age: 54
End: 2024-02-21

## 2024-02-21 ENCOUNTER — OFFICE VISIT (OUTPATIENT)
Dept: FAMILY MEDICINE CLINIC | Facility: CLINIC | Age: 54
End: 2024-02-21
Payer: COMMERCIAL

## 2024-02-21 VITALS
HEIGHT: 61 IN | DIASTOLIC BLOOD PRESSURE: 60 MMHG | OXYGEN SATURATION: 97 % | SYSTOLIC BLOOD PRESSURE: 90 MMHG | TEMPERATURE: 97.4 F | HEART RATE: 64 BPM | BODY MASS INDEX: 23.49 KG/M2 | WEIGHT: 124.4 LBS

## 2024-02-21 DIAGNOSIS — Z21 HIV TEST POSITIVE (HCC): Primary | ICD-10-CM

## 2024-02-21 PROCEDURE — 99213 OFFICE O/P EST LOW 20 MIN: CPT | Performed by: FAMILY MEDICINE

## 2024-02-21 NOTE — PROGRESS NOTES
"Assessment/Plan:    No problem-specific Assessment & Plan notes found for this encounter.       Diagnoses and all orders for this visit:    HIV test positive ([facility])  Comments:  Referral to Infectious Disease placed given repettive false positive HIV tests  Orders:  -     Ambulatory Referral to Infectious Disease; Future          Subjective:      Patient ID: Aurora Antoine is a 53 y.o. female.    HPI  Aurora Antoine is a very pleasant 53 year old female with a past medical history of hypertension who presents today to discuss recent test results. Patient had STD testing 2/7 and HIV test was positive. Unfortunately not enough blood was collected for confirmatory testing. Patient did go to the [facility] and had repeat HIV testing 2/15 which was negative. Of note patient had STD testing in 2020 and HIV testing was reactive at that time however confirmatory testing was negative. Patient is requesting a referral to infectious disease for further evaluation given these repetitive false positive HIV tests.    The following portions of the patient's history were reviewed and updated as appropriate: allergies, current medications, past family history, past medical history, past social history, past surgical history, and problem list.    Review of Systems   Constitutional: Negative.    HENT: Negative.     Eyes: Negative.    Respiratory: Negative.     Cardiovascular: Negative.    Gastrointestinal: Negative.    Genitourinary: Negative.    Musculoskeletal: Negative.    Skin: Negative.    Neurological: Negative.    Psychiatric/Behavioral: Negative.           Objective:      BP 90/60 (BP Location: Left arm, Patient Position: Sitting, Cuff Size: Adult)   Pulse 64   Temp (!) 97.4 °F (36.3 °C) (Temporal)   Ht 5' 1\" (1.549 m)   Wt 56.4 kg (124 lb 6.4 oz)   SpO2 97%   BMI 23.51 kg/m²          Physical Exam  Constitutional:       General: She is not in acute distress.     Appearance: She is not ill-appearing.   HENT:      Head: " Normocephalic and atraumatic.   Eyes:      General:         Right eye: No discharge.         Left eye: No discharge.      Extraocular Movements: Extraocular movements intact.   Cardiovascular:      Rate and Rhythm: Normal rate.   Pulmonary:      Effort: No respiratory distress.   Neurological:      General: No focal deficit present.      Mental Status: She is alert.   Psychiatric:         Mood and Affect: Mood normal.         Behavior: Behavior normal.

## 2024-02-21 NOTE — TELEPHONE ENCOUNTER
Patient given results for HIV/STI testing. All tests, Gonorrhea, Chlamydia, Syphilis, and HIV were negative. Test results reviewed with the patient. Patient acknowledged understanding of such. Patient encouraged to return with any new symptoms, new sexual partners and at least once yearly.

## 2024-02-22 ENCOUNTER — APPOINTMENT (OUTPATIENT)
Dept: PHYSICAL THERAPY | Facility: CLINIC | Age: 54
End: 2024-02-22
Payer: COMMERCIAL

## 2024-02-22 ENCOUNTER — OFFICE VISIT (OUTPATIENT)
Dept: PHYSICAL THERAPY | Facility: CLINIC | Age: 54
End: 2024-02-22
Payer: COMMERCIAL

## 2024-02-22 DIAGNOSIS — M25.521 RIGHT ELBOW PAIN: Primary | ICD-10-CM

## 2024-02-22 DIAGNOSIS — M77.11 LATERAL EPICONDYLITIS, RIGHT ELBOW: ICD-10-CM

## 2024-02-22 PROCEDURE — 97110 THERAPEUTIC EXERCISES: CPT

## 2024-02-22 PROCEDURE — 97140 MANUAL THERAPY 1/> REGIONS: CPT

## 2024-02-22 NOTE — PROGRESS NOTES
"Daily Note     Today's date: 2024  Patient name: Aurora Antoine  : 1970  MRN: [id number]  Referring provider: Candy Alonso MD  Dx:   Encounter Diagnosis     ICD-10-CM    1. Right elbow pain  M25.521       2. Lateral epicondylitis, right elbow  M77.11                      Subjective: Pt reporting pain stiffness on left upper trap area, states it comes and goes but may worsen depending on the activity.      Objective: See treatment diary below      Assessment: Tolerated treatment well.  Added in cervical stretches and mobility exercises this session, favorable response to chin tucks w/ overpressure.  Pt would benefit from additional intervention addressing mobility and strength of cervical spine segments and musculature,  PT will progress current exercises as appropriate.  Patient exhibited good technique with therapeutic exercises and would benefit from continued PT      Plan: Continue per plan of care.      Precautions: none     Manuals    R extensor/ supinator mx work CS SH SH         MFD & IASTM CS  SH IASTM         R radial mobs            Lateral epicondyle MWM    CS CS CS      Radial head mobs     CS CS CS  JK   Reassess    CS        STM right bicep      CS      Neuro Re-Ed           Supinateddo radial/ulnar deviation  HEP          3-way Bodyblade   10\"x5 ea         Scap 4      Black rows and low rows, 10x3\" ea 15# rows, low rows  15# rows, low rows   Alt shoulder taps modified plantigrade       2x20 alt  2x20 alt   Standing ITY       2x10 3# YT     Ther Ex          Wrist extension/ supination iso  Grn bar 2x20 ea Grn bar 2x20 ea Grn bar x20 ea Grn bar x20 ea Grn bar 2x20 ea Grn bar 2x20 ea     Wrist extensor stretch & flexion 20\"x5 ea & flexion 20\"x5 ea & flexion 20\"x5 ea 20\"x5 and flexion  20\"x2 flex 3x30\" flex 3x30\" flex     Doorway pec stretch Palm down 30\"x3 Palm down 30\"x3 Palm down 30\"x3 Palm down 30\"x3 Palm down 30\"x3 " "Palm down 30x3\" Palm down 3x30\"     Hammer curls  3# 3x10 4# 3x10 3# 2x10 4# 3x10 5# 3x10 7# 3x10     Wrist extension & flexion 3# 3x10 ext slow ecc 3# 3x10 ext slow ecc 3# 3x10 ext slow ecc 2# 2x10 ext slow ecc 3# 2x10 ext slow ecc 4# 2x10 ext slow ecc 5# 2x10 ext slow ecc                 Web ext Blue 2x20           Doorway triceps stretch  20\"x5 20\"x5 20\"x5 20\"x5 20\"x3 20x3\"     Chin tucks w/ pt overpressure         25'               Modalities 12/5 12/12 12/14 1/30 2/1 2/6 2/13     [facility] 10' 10' 10' 10' 10' 8' 8'                                  "

## 2024-02-23 ENCOUNTER — TELEPHONE (OUTPATIENT)
Dept: HEMATOLOGY ONCOLOGY | Facility: CLINIC | Age: 54
End: 2024-02-23

## 2024-02-23 DIAGNOSIS — D50.8 OTHER IRON DEFICIENCY ANEMIA: ICD-10-CM

## 2024-02-23 DIAGNOSIS — D47.2 MONOCLONAL GAMMOPATHY: Primary | ICD-10-CM

## 2024-02-23 NOTE — TELEPHONE ENCOUNTER
Lab Inquiry   Who are you speaking with? Patient     If it is not the patient, are they listed on an active communication consent form? N/A   Name of ordering provider Dr. Hernandez   What is being requested? Lab orders need to be entered   Lab draw location [facility]   What is the best call back number? [phone]- please call patient when orders are in chart.     If patient at the lab, Was a live attempts to contact the team made?

## 2024-02-23 NOTE — TELEPHONE ENCOUNTER
Attempted call to patient x2. Not able to leave a vm as vm box full. My chart message sent to patient in regards to labs prior to follow up.

## 2024-02-23 NOTE — TELEPHONE ENCOUNTER
Appointment Schedule   Who are you speaking with? Patient   If it is not the patient, are they listed on an active communication consent form? N/A   Which provider is the appointment scheduled with? Dr. Hernandez   At which location is the appointment scheduled for? [place]   When is the appointment scheduled?  Please list date and time 3/4/24 @ 11am   What is the reason for this appointment? Follow up /labs   Did patient voice understanding of the details of this appointment? Yes   Was the no show policy reviewed with patient? Yes

## 2024-02-29 ENCOUNTER — OFFICE VISIT (OUTPATIENT)
Dept: PHYSICAL THERAPY | Facility: CLINIC | Age: 54
End: 2024-02-29
Payer: COMMERCIAL

## 2024-02-29 DIAGNOSIS — M25.521 RIGHT ELBOW PAIN: Primary | ICD-10-CM

## 2024-02-29 DIAGNOSIS — M77.11 LATERAL EPICONDYLITIS, RIGHT ELBOW: ICD-10-CM

## 2024-02-29 PROCEDURE — 97112 NEUROMUSCULAR REEDUCATION: CPT

## 2024-02-29 PROCEDURE — 97110 THERAPEUTIC EXERCISES: CPT

## 2024-02-29 NOTE — PROGRESS NOTES
"Daily Note     Today's date: 2024  Patient name: Aurora Antoine  : 1970  MRN: [id number]  Referring provider: Candy Alonso MD  Dx:   Encounter Diagnosis     ICD-10-CM    1. Right elbow pain  M25.521       2. Lateral epicondylitis, right elbow  M77.11                      Subjective: Patient reports feeling 98% better, continues to check for pinching with elbow flexion. Able to open jar and \"do things normally\" with affected hand because she's gotten so much stronger.       Objective: See treatment diary below      Assessment: Tolerated treatment well. Minimal symptoms with weighted elbow flex, resolved with repeated cervical retraction with extension. Encouraged patient to continue at home for symptom management and complete resolution. Able to progress with shoulder stability exercises without adverse reaction. Patient would benefit from continued PT to address remaining deficits.       Plan: Progress treatment as tolerated.       Precautions: none     Manuals    R extensor/ supinator mx work          MFD & IASTM          R radial mobs          Lateral epicondyle MWM CS CS CS       Radial head mobs  CS CS CS  JK    Reassess CS         STM right bicep   CS       Neuro Re-Ed          Supinateddo radial/ulnar deviation          3-way Bodyblade          Scap 4   Black rows and low rows, 10x3\" ea 15# rows, low rows  15# rows, low rows 17.5# low rows 2x10 and rows   Alt shoulder taps modified plantigrade    2x20 alt  2x20 alt 3x20 alt   Standing ITY    2x10 3# YT   2x10 5# ITY   Ther Ex 1/30         Wrist extension/ supination iso Grn bar x20 ea Grn bar x20 ea Grn bar 2x20 ea Grn bar 2x20 ea      Wrist extensor stretch 20\"x5 and flexion  20\"x2 flex 3x30\" flex 3x30\" flex      Doorway pec stretch Palm down 30\"x3 Palm down 30\"x3 Palm down 30x3\" Palm down 3x30\"      Hammer curls 3# 2x10 4# 3x10 5# 3x10 7# 3x10      Wrist extension & flexion 2# 2x10 ext slow ecc 3# 2x10 ext slow " "ecc 4# 2x10 ext slow ecc 5# 2x10 ext slow ecc                Web ext          Doorway triceps stretch 20\"x5 20\"x5 20\"x3 20x3\"      Chin tucks w/ pt overpressure      25' 2X10  X10 pt OP and w/ ext             Modalities 1/30 2/1 2/6 2/13      [facility] 10' 10' 8' 8'                                   "

## 2024-03-04 ENCOUNTER — OFFICE VISIT (OUTPATIENT)
Dept: HEMATOLOGY ONCOLOGY | Facility: CLINIC | Age: 54
End: 2024-03-04
Payer: COMMERCIAL

## 2024-03-04 ENCOUNTER — APPOINTMENT (OUTPATIENT)
Dept: LAB | Facility: HOSPITAL | Age: 54
End: 2024-03-04
Payer: COMMERCIAL

## 2024-03-04 VITALS
HEIGHT: 61 IN | WEIGHT: 123 LBS | TEMPERATURE: 97 F | BODY MASS INDEX: 23.22 KG/M2 | OXYGEN SATURATION: 97 % | SYSTOLIC BLOOD PRESSURE: 118 MMHG | DIASTOLIC BLOOD PRESSURE: 64 MMHG | HEART RATE: 48 BPM | RESPIRATION RATE: 18 BRPM

## 2024-03-04 DIAGNOSIS — D50.8 OTHER IRON DEFICIENCY ANEMIA: ICD-10-CM

## 2024-03-04 DIAGNOSIS — Z11.3 SCREENING EXAMINATION FOR STD (SEXUALLY TRANSMITTED DISEASE): ICD-10-CM

## 2024-03-04 DIAGNOSIS — Z21 HIV ANTIBODY POSITIVE (HCC): Primary | ICD-10-CM

## 2024-03-04 DIAGNOSIS — D47.2 MONOCLONAL GAMMOPATHY: ICD-10-CM

## 2024-03-04 LAB
ALBUMIN SERPL BCP-MCNC: 4.2 G/DL (ref 3.5–5)
ALP SERPL-CCNC: 65 U/L (ref 34–104)
ALT SERPL W P-5'-P-CCNC: 24 U/L (ref 7–52)
ANION GAP SERPL CALCULATED.3IONS-SCNC: 10 MMOL/L
AST SERPL W P-5'-P-CCNC: 28 U/L (ref 13–39)
BASOPHILS # BLD AUTO: 0.04 THOUSANDS/ÂΜL (ref 0–0.1)
BASOPHILS NFR BLD AUTO: 1 % (ref 0–1)
BILIRUB SERPL-MCNC: 0.51 MG/DL (ref 0.2–1)
BUN SERPL-MCNC: 17 MG/DL (ref 5–25)
CALCIUM SERPL-MCNC: 9.1 MG/DL (ref 8.4–10.2)
CHLORIDE SERPL-SCNC: 106 MMOL/L (ref 96–108)
CO2 SERPL-SCNC: 25 MMOL/L (ref 21–32)
CREAT SERPL-MCNC: 0.82 MG/DL (ref 0.6–1.3)
CRP SERPL QL: <1 MG/L
EOSINOPHIL # BLD AUTO: 0.09 THOUSAND/ÂΜL (ref 0–0.61)
EOSINOPHIL NFR BLD AUTO: 3 % (ref 0–6)
ERYTHROCYTE [DISTWIDTH] IN BLOOD BY AUTOMATED COUNT: 12.3 % (ref 11.6–15.1)
ERYTHROCYTE [SEDIMENTATION RATE] IN BLOOD: 15 MM/HOUR (ref 0–29)
FERRITIN SERPL-MCNC: 55 NG/ML (ref 11–307)
GFR SERPL CREATININE-BSD FRML MDRD: 81 ML/MIN/1.73SQ M
GLUCOSE P FAST SERPL-MCNC: 94 MG/DL (ref 65–99)
HCT VFR BLD AUTO: 36 % (ref 34.8–46.1)
HGB BLD-MCNC: 11.8 G/DL (ref 11.5–15.4)
IGA SERPL-MCNC: 181 MG/DL (ref 66–433)
IGG SERPL-MCNC: 1191 MG/DL (ref 635–1741)
IGM SERPL-MCNC: 125 MG/DL (ref 45–281)
IMM GRANULOCYTES # BLD AUTO: 0.01 THOUSAND/UL (ref 0–0.2)
IMM GRANULOCYTES NFR BLD AUTO: 0 % (ref 0–2)
IRON SATN MFR SERPL: 28 % (ref 15–50)
IRON SERPL-MCNC: 96 UG/DL (ref 50–212)
LDH SERPL-CCNC: 171 U/L (ref 140–271)
LYMPHOCYTES # BLD AUTO: 1.69 THOUSANDS/ÂΜL (ref 0.6–4.47)
LYMPHOCYTES NFR BLD AUTO: 48 % (ref 14–44)
MCH RBC QN AUTO: 30.8 PG (ref 26.8–34.3)
MCHC RBC AUTO-ENTMCNC: 32.8 G/DL (ref 31.4–37.4)
MCV RBC AUTO: 94 FL (ref 82–98)
MONOCYTES # BLD AUTO: 0.36 THOUSAND/ÂΜL (ref 0.17–1.22)
MONOCYTES NFR BLD AUTO: 10 % (ref 4–12)
NEUTROPHILS # BLD AUTO: 1.36 THOUSANDS/ÂΜL (ref 1.85–7.62)
NEUTS SEG NFR BLD AUTO: 38 % (ref 43–75)
NRBC BLD AUTO-RTO: 0 /100 WBCS
PLATELET # BLD AUTO: 160 THOUSANDS/UL (ref 149–390)
PMV BLD AUTO: 10.1 FL (ref 8.9–12.7)
POTASSIUM SERPL-SCNC: 3.6 MMOL/L (ref 3.5–5.3)
PROT SERPL-MCNC: 7.4 G/DL (ref 6.4–8.4)
RBC # BLD AUTO: 3.83 MILLION/UL (ref 3.81–5.12)
SODIUM SERPL-SCNC: 141 MMOL/L (ref 135–147)
TIBC SERPL-MCNC: 341 UG/DL (ref 250–450)
UIBC SERPL-MCNC: 245 UG/DL (ref 155–355)
VIT B12 SERPL-MCNC: 560 PG/ML (ref 180–914)
WBC # BLD AUTO: 3.55 THOUSAND/UL (ref 4.31–10.16)

## 2024-03-04 PROCEDURE — 83550 IRON BINDING TEST: CPT

## 2024-03-04 PROCEDURE — 82728 ASSAY OF FERRITIN: CPT

## 2024-03-04 PROCEDURE — 99214 OFFICE O/P EST MOD 30 MIN: CPT | Performed by: INTERNAL MEDICINE

## 2024-03-04 PROCEDURE — 82607 VITAMIN B-12: CPT

## 2024-03-04 PROCEDURE — 83540 ASSAY OF IRON: CPT

## 2024-03-04 PROCEDURE — 87535 HIV-1 PROBE&REVERSE TRNSCRPJ: CPT

## 2024-03-04 PROCEDURE — 86334 IMMUNOFIX E-PHORESIS SERUM: CPT

## 2024-03-04 PROCEDURE — 82784 ASSAY IGA/IGD/IGG/IGM EACH: CPT

## 2024-03-04 PROCEDURE — 84165 PROTEIN E-PHORESIS SERUM: CPT

## 2024-03-04 PROCEDURE — 80053 COMPREHEN METABOLIC PANEL: CPT

## 2024-03-04 PROCEDURE — 87538 HIV-2 PROBE&REVRSE TRNSCRIPJ: CPT

## 2024-03-04 PROCEDURE — 86140 C-REACTIVE PROTEIN: CPT

## 2024-03-04 PROCEDURE — 86701 HIV-1ANTIBODY: CPT

## 2024-03-04 PROCEDURE — 85025 COMPLETE CBC W/AUTO DIFF WBC: CPT

## 2024-03-04 PROCEDURE — 36415 COLL VENOUS BLD VENIPUNCTURE: CPT

## 2024-03-04 PROCEDURE — 83521 IG LIGHT CHAINS FREE EACH: CPT

## 2024-03-04 PROCEDURE — 86702 HIV-2 ANTIBODY: CPT

## 2024-03-04 PROCEDURE — 83615 LACTATE (LD) (LDH) ENZYME: CPT

## 2024-03-04 PROCEDURE — 85652 RBC SED RATE AUTOMATED: CPT

## 2024-03-04 NOTE — PROGRESS NOTES
Hematology/Oncology Outpatient Follow-up  Aurora Antoine 53 y.o. female 1970 [id number]    Date:  3/4/2024        Assessment and Plan:  1. HIV antibody positive (HCC)  She had multiple questions regarding the most recent HIV test which showed a reactive process for HIV1 and he body and antigen.  I asked the patient to consider him seeing the infectious disease team for further evaluation since she stated that her HIV test comes back positive and sometimes negative.  I deferred all the questions to the ID team.    - Ambulatory Referral to Infectious Disease; Future    2. Monoclonal gammopathy  She seems to have established diagnosis of MGUS IgG kappa gammopathy.  I did ask her to go ahead and get the blood work done as soon as possible to see if there is any obvious hint of endorgan damage or any hint of transformation into active disease.  Otherwise, we will continue to monitor her on a yearly basis.  - CBC and differential; Future  - Comprehensive metabolic panel; Future  - IgG, IgA, IgM; Future  - Protein electrophoresis, serum; Future  - Immunoglobulin free LT chains blood; Future  - Kappa/Lambda Light Chains Free With Ratio,Urine; Future  - Protein, Total and Protein Electrophoresis with Immunofixation; Future  - LD,Blood; Future    3. Other iron deficiency anemia  She seems to have low normal hemoglobin level.  The iron panel will be checked prior to her next visit.  - CBC and differential; Future  - Iron Panel (Includes Ferritin, Iron Sat%, Iron, and TIBC); Future  - Ferritin; Future        HPI:    This is a 53-year-old female with a history of bradycardia, hypotension, and 6 pregnancies with 5 full-term deliveries. The patient also seems to have chronic anemia with iron deficiency which is being treated with oral iron supplements on and off.  She also had extensive workup by her PCP 2019 including a serum protein electrophoresis which showed an M spike.  The serum immunofixation showed IgG kappa  monoclonal gammopathy at the 0.5 grams/deciliter.  The UPEP was negative for monoclonal protein.      Interval history:  The patient came there for follow-up visit after a lengthy interruption of care.  Her last visit to us was from September 2022.  She was supposed to get blood work prior to this office visit for further evaluation of her MGUS which was not done.  She had multiple HIV tests in the past.  Most recent 1 was on 2/7/2024 which showed reactive HIV-1 antibody and HIV antigen.  She stated her test came back positive and negative for the HIV in the past on multiple occasions.  Recent CBC on 2/7/2024 showed white cell count of 4.2 with hemoglobin of 11.7.  Platelet count was 171.  ROS: Review of Systems   Constitutional:  Positive for fatigue. Negative for chills and fever.   HENT:  Negative for ear pain and sore throat.    Eyes:  Negative for pain and visual disturbance.   Respiratory:  Positive for cough. Negative for shortness of breath.    Cardiovascular:  Negative for chest pain and palpitations.   Gastrointestinal:  Positive for diarrhea. Negative for abdominal pain and vomiting.   Genitourinary:  Negative for dysuria and hematuria.   Musculoskeletal:  Negative for arthralgias and back pain.   Skin:  Negative for color change and rash.   Neurological:  Positive for headaches. Negative for seizures and syncope.   Psychiatric/Behavioral:  Positive for sleep disturbance.    All other systems reviewed and are negative.      Past Medical History:   Diagnosis Date    Anemia     iron deficiency anemia    Anxiety     Bradycardia     Bradycardia     Chronic diarrhea     Chronic left shoulder pain     Chronic nasal congestion     Depression     Disease of pericardium     Elevated LFTs     Excessive daytime sleepiness     Hammer toe of right foot     Hemorrhoids     History of COVID-19     3/2022    Hypercholesterolemia     Hypertension     Hypotension     Insomnia     Leukopenia     Low back pain     Monoclonal  "gammopathy     Nonrheumatic aortic (valve) insufficiency     Prediabetes     Restless leg syndrome     Sleep apnea     Urinary frequency        Past Surgical History:   Procedure Laterality Date    BREAST RECONSTRUCTION      \"breast lift\" June 2020    COLONOSCOPY      HEMORROIDECTOMY      [place] CORRJ HLX VLGS BNCTY SESMDC DSTL METAR OSTEOT Left 2/17/2023    Procedure: BUNIONECTOMY KELTON LEFT FOOT;  Surgeon: Delonte Trinidad DPM;  Location:  MAIN OR;  Service: Podiatry    TOE OSTEOTOMY Left 2/17/2023    Procedure: OSTEOTOMY OF PROXIMAL PHALANX LEFT GREAT TOE;  Surgeon: Delonte Trinidad DPM;  Location:  MAIN OR;  Service: Podiatry    WISDOM TOOTH EXTRACTION      WRIST SURGERY Right        Social History     Socioeconomic History    Marital status:      Spouse name: None    Number of children: None    Years of education: None    Highest education level: None   Occupational History    None   Tobacco Use    Smoking status: Every Day    Smokeless tobacco: Never    Tobacco comments:     Smokes marijuana - has medical card - smokes daily    Vaping Use    Vaping status: Never Used   Substance and Sexual Activity    Alcohol use: Yes     Alcohol/week: 2.0 - 6.0 standard drinks of alcohol     Types: 1 - 3 Glasses of wine, 1 - 3 Shots of liquor per week     Comment: Not daily, sometimes weekly or longer    Drug use: Yes     Types: Marijuana     Comment: Per pt daily use of medical marijuana - has card    Sexual activity: Yes     Partners: Male     Birth control/protection: I.U.D.     Comment: Mirena IUD in place - denies any chest pain or shortness of breath with activity   Other Topics Concern    None   Social History Narrative    None     Social Determinants of Health     Financial Resource Strain: Low Risk  (3/21/2023)    Overall Financial Resource Strain (CARDIA)     Difficulty of Paying Living Expenses: Not very hard   Food Insecurity: No Food Insecurity (3/21/2023)    Hunger Vital Sign     Worried About Running Out of Food in " the Last Year: Never true     Ran Out of Food in the Last Year: Never true   Transportation Needs: No Transportation Needs (3/21/2023)    PRAPARE - Transportation     Lack of Transportation (Medical): No     Lack of Transportation (Non-Medical): No   Physical Activity: Sufficiently Active (3/8/2022)    Exercise Vital Sign     Days of Exercise per Week: 3 days     Minutes of Exercise per Session: 60 min   Stress: No Stress Concern Present (3/8/2022)    [employer] [place] [employer] - Occupational Stress Questionnaire     Feeling of Stress : Not at all   Social Connections: Not on file   Intimate Partner Violence: Not At Risk (3/8/2022)    Humiliation, Afraid, Rape, and Kick questionnaire     Fear of Current or Ex-Partner: No     Emotionally Abused: No     Physically Abused: No     Sexually Abused: No   Housing Stability: Low Risk  (3/8/2022)    Housing Stability Vital Sign     Unable to Pay for Housing in the Last Year: No     Number of Places Lived in the Last Year: 1     Unstable Housing in the Last Year: No       Family History   Problem Relation Age of Onset    No Known Problems Mother     No Known Problems Father     No Known Problems Sister     No Known Problems Sister     No Known Problems Daughter     No Known Problems Son     No Known Problems Son     No Known Problems Son     No Known Problems Son     Hypertension Maternal Grandmother     No Known Problems Maternal Grandfather     Diabetes Paternal Grandmother     No Known Problems Paternal Grandfather     Anesthesia problems Neg Hx        Allergies   Allergen Reactions    Atorvastatin GI Intolerance     Pt reports having liver testing elevated     Crestor [Rosuvastatin] Other (See Comments)     Upset stomach and diet         Current Outpatient Medications:     amLODIPine (NORVASC) 5 mg tablet, Take 1 tablet by mouth once daily, Disp: 90 tablet, Rfl: 0    GUAIFENESIN PO, Take 400 mg by mouth 2 (two) times a day as needed, Disp: , Rfl:      "levonorgestrel (MIRENA) 20 MCG/24HR IUD, 1 each by Intrauterine route once  , Disp: , Rfl:     Multiple Vitamin (multivitamin) tablet, Take 1 tablet by mouth daily Alive MVI, Disp: , Rfl:     NON FORMULARY, in the morning 2/16/23 per pt - takes oregano oil, Disp: , Rfl:     Pseudoephedrine-guaiFENesin (GUAIFENESIN-P PO), Take by mouth, Disp: , Rfl:       Physical Exam:  /64 (BP Location: Left arm)   Pulse (!) 48   Temp (!) 97 °F (36.1 °C) (Tympanic)   Resp 18   Ht 5' 1\" (1.549 m)   Wt 55.8 kg (123 lb)   SpO2 97%   BMI 23.24 kg/m²     Physical Exam  Constitutional:       General: She is not in acute distress.     Appearance: She is well-developed. She is not diaphoretic.   HENT:      Head: Normocephalic and atraumatic.      Nose: Nose normal.   Eyes:      General: No scleral icterus.        Right eye: No discharge.         Left eye: No discharge.      Conjunctiva/sclera: Conjunctivae normal.      Pupils: Pupils are equal, round, and reactive to light.   Neck:      Thyroid: No thyromegaly.      Vascular: No JVD.      Trachea: No tracheal deviation.   Cardiovascular:      Rate and Rhythm: Normal rate and regular rhythm.      Heart sounds: Normal heart sounds. No murmur heard.     No friction rub.   Pulmonary:      Effort: Pulmonary effort is normal. No respiratory distress.      Breath sounds: Normal breath sounds. No stridor. No wheezing or rales.   Chest:      Chest wall: No tenderness.   Abdominal:      General: There is no distension.      Palpations: Abdomen is soft. There is no hepatomegaly or splenomegaly.      Tenderness: There is no abdominal tenderness. There is no guarding or rebound.   Musculoskeletal:         General: No tenderness or deformity. Normal range of motion.      Cervical back: Normal range of motion and neck supple.   Lymphadenopathy:      Cervical: No cervical adenopathy.   Skin:     General: Skin is warm and dry.      Coloration: Skin is not pale.      Findings: No erythema or " rash.   Neurological:      Mental Status: She is alert and oriented to person, place, and time.      Cranial Nerves: No cranial nerve deficit.      Coordination: Coordination normal.      Deep Tendon Reflexes: Reflexes are normal and symmetric.   Psychiatric:         Behavior: Behavior normal.         Thought Content: Thought content normal.         Judgment: Judgment normal.           Labs:  Lab Results   Component Value Date    WBC 4.26 (L) 02/07/2024    HGB 11.7 02/07/2024    HCT 36.3 02/07/2024    MCV 96 02/07/2024     02/07/2024     Lab Results   Component Value Date    K 3.9 10/19/2023     10/19/2023    CO2 29 10/19/2023    BUN 17 10/19/2023    CREATININE 0.79 10/19/2023    GLUF 99 10/19/2023    CALCIUM 9.5 10/19/2023    AST 35 10/19/2023    ALT 36 10/19/2023    ALKPHOS 79 10/19/2023    EGFR 85 10/19/2023     Lab Results   Component Value Date    TSH 0.62 08/09/2022       Patient voiced understanding and agreement in the above discussion. Aware to contact our office with questions/symptoms in the interim.

## 2024-03-05 LAB
ALBUMIN SERPL ELPH-MCNC: 4.03 G/DL (ref 3.2–5.1)
ALBUMIN SERPL ELPH-MCNC: 59.3 % (ref 48–70)
ALPHA1 GLOB SERPL ELPH-MCNC: 0.25 G/DL (ref 0.15–0.47)
ALPHA1 GLOB SERPL ELPH-MCNC: 3.7 % (ref 1.8–7)
ALPHA2 GLOB SERPL ELPH-MCNC: 0.65 G/DL (ref 0.42–1.04)
ALPHA2 GLOB SERPL ELPH-MCNC: 9.5 % (ref 5.9–14.9)
BETA GLOB ABNORMAL SERPL ELPH-MCNC: 0.4 G/DL (ref 0.31–0.57)
BETA1 GLOB SERPL ELPH-MCNC: 5.9 % (ref 4.7–7.7)
BETA2 GLOB SERPL ELPH-MCNC: 4.9 % (ref 3.1–7.9)
BETA2+GAMMA GLOB SERPL ELPH-MCNC: 0.33 G/DL (ref 0.2–0.58)
GAMMA GLOB ABNORMAL SERPL ELPH-MCNC: 1.14 G/DL (ref 0.4–1.66)
GAMMA GLOB SERPL ELPH-MCNC: 16.7 % (ref 6.9–22.3)
IGG/ALB SER: 1.46 {RATIO} (ref 1.1–1.8)
INTERPRETATION UR IFE-IMP: NORMAL
KAPPA LC FREE SER-MCNC: 15.1 MG/L (ref 3.3–19.4)
KAPPA LC FREE/LAMBDA FREE SER: 1.14 {RATIO} (ref 0.26–1.65)
LAMBDA LC FREE SERPL-MCNC: 13.2 MG/L (ref 5.7–26.3)
M PROTEIN 1 MFR SERPL ELPH: 4.5 %
M PROTEIN 1 SERPL ELPH-MCNC: 0.31 G/DL
PROT PATTERN SERPL ELPH-IMP: NORMAL
PROT SERPL-MCNC: 6.8 G/DL (ref 6.4–8.2)

## 2024-03-05 PROCEDURE — 84165 PROTEIN E-PHORESIS SERUM: CPT | Performed by: STUDENT IN AN ORGANIZED HEALTH CARE EDUCATION/TRAINING PROGRAM

## 2024-03-05 PROCEDURE — 86334 IMMUNOFIX E-PHORESIS SERUM: CPT | Performed by: STUDENT IN AN ORGANIZED HEALTH CARE EDUCATION/TRAINING PROGRAM

## 2024-03-06 ENCOUNTER — CONSULT (OUTPATIENT)
Dept: INFECTIOUS DISEASES | Facility: CLINIC | Age: 54
End: 2024-03-06
Payer: COMMERCIAL

## 2024-03-06 ENCOUNTER — APPOINTMENT (OUTPATIENT)
Dept: LAB | Facility: CLINIC | Age: 54
End: 2024-03-06
Payer: COMMERCIAL

## 2024-03-06 VITALS
BODY MASS INDEX: 23.22 KG/M2 | WEIGHT: 123 LBS | HEIGHT: 61 IN | DIASTOLIC BLOOD PRESSURE: 75 MMHG | SYSTOLIC BLOOD PRESSURE: 118 MMHG | RESPIRATION RATE: 18 BRPM | HEART RATE: 59 BPM | OXYGEN SATURATION: 98 % | TEMPERATURE: 97.5 F

## 2024-03-06 DIAGNOSIS — Z21 HIV ANTIBODY POSITIVE (HCC): ICD-10-CM

## 2024-03-06 DIAGNOSIS — Z21 HIV ANTIBODY POSITIVE (HCC): Primary | ICD-10-CM

## 2024-03-06 PROCEDURE — 36415 COLL VENOUS BLD VENIPUNCTURE: CPT

## 2024-03-06 PROCEDURE — 87536 HIV-1 QUANT&REVRSE TRNSCRPJ: CPT

## 2024-03-06 PROCEDURE — 99244 OFF/OP CNSLTJ NEW/EST MOD 40: CPT | Performed by: STUDENT IN AN ORGANIZED HEALTH CARE EDUCATION/TRAINING PROGRAM

## 2024-03-06 NOTE — PROGRESS NOTES
Outpatient Consultation - Infectious Disease   Aurora Antoine 53 y.o. female MRN: [id number]  Unit/Bed#:  Encounter: [id number]      IMPRESSION & RECOMMENDATIONS:     1.  Positive HIV-1 antibody.  The patient has now had 2 tests since March 2022 where HIV-1 antibody was detected on initial screening but confirmatory testing was negative.  3/8/2022 fourth-generation testing was positive due to HIV-1 antibody but confirmatory testing including HIV 1/2 PCR were negative.  Fourth-generation HIV screen in March 2023 was negative.  2/7 fifth generation HIV screen positive due to HIV 1 antibody.  Multi swab is pending but the patient had repeat testing performed through [facility] 2/15 which was sent to a different lab and this was negative.  Prior testing is thus far consistent with false positive results.  I explained to the patient the different types of HIV testing and issues regarding sensitivity and specificity of the test.  I also reviewed possible reasons for false positive test results.  Although I cannot say definitively the specific cause in this patient, I suspect cross-reactivity with the HIV 1 antibody is related to abnormal paraprotein formation with her MGUS, formation of a similar appearing autoantibody during one of her pregnancies, versus reactivity to one of the antigens used in the testing platform.   -Follow-up multispot   -Check HIV 1 PCR for completeness   -If above are negative, this is consistent with a false positive test result, not active HIV    2.  MGUS.  Patient follows with hematology.  Not on any current therapies.    Above management plan was discussed with the patient in detail and all questions answered to her satisfaction.  Follow-up with ID as needed.    HISTORY OF PRESENT ILLNESS:  Reason for Consult: false positive HIV testing  HPI: Aurora Antoine is a 53 y.o. year old female with a history of MGUS, iron deficiency anemia who presents for evaluation of a positive HIV 1 antibody on testing.   The patient has several questions about her HIV testing.  3/8/2022 the patient had routine HIV screening and fourth-generation screen was positive based on HIV-1 antibody.  Confirmatory testing was negative and HIV 1/2 RNA was also negative.  The patient had repeat fourth-generation testing performed 3/21/2023 and this was negative.  She underwent repeat screening 2/7/2024 since she discovered that her partner at the time was unfaithful with other sexual partners throughout their relationship over the past few years.  HIV 1 antibody was again positive.  The patient underwent repeat testing through the [facility] 2/15 which was sent to the state lab and was negative.  The patient denies any history of weight loss, current fever, chills, shortness of breath.  She feels overall well.  The repeat positive testing is causing her anxiety.  The patient follows with hematology for MGUS and is not currently on any therapies.  She has no personal or family history of autoimmune conditions.  The patient has had 6 pregnancies last in 2009, with 5 full-term deliveries and 1 pregnancy termination.  She denies issues requiring blood transfusions and does not remember receiving RhoGAM during pregnancy.    REVIEW OF SYSTEMS:  A complete review of systems are negative other than that noted in the HPI     PAST MEDICAL HISTORY:  Past Medical History:   Diagnosis Date    Anemia     iron deficiency anemia    Anxiety     Bradycardia     Bradycardia     Chronic diarrhea     Chronic left shoulder pain     Chronic nasal congestion     Depression     Disease of pericardium     Elevated LFTs     Excessive daytime sleepiness     Hammer toe of right foot     Hemorrhoids     History of COVID-19     3/2022    Hypercholesterolemia     Hypertension     Hypotension     Insomnia     Leukopenia     Low back pain     Monoclonal gammopathy     Nonrheumatic aortic (valve) insufficiency     Prediabetes     Restless leg syndrome     Sleep apnea     Urinary  "frequency      Past Surgical History:   Procedure Laterality Date    BREAST RECONSTRUCTION      \"breast lift\" June 2020    COLONOSCOPY      HEMORROIDECTOMY      [place] CORRJ HLX VLGS BNCTY SESMDC DSTL METAR OSTEOT Left 2/17/2023    Procedure: BUNIONECTOMY KELTON LEFT FOOT;  Surgeon: Delonte Trinidad DPM;  Location:  MAIN OR;  Service: Podiatry    TOE OSTEOTOMY Left 2/17/2023    Procedure: OSTEOTOMY OF PROXIMAL PHALANX LEFT GREAT TOE;  Surgeon: Delonte Trinidad DPM;  Location:  MAIN OR;  Service: Podiatry    WISDOM TOOTH EXTRACTION      WRIST SURGERY Right        FAMILY HISTORY:  Non-contributory    SOCIAL HISTORY:  Social History   Social History     Substance and Sexual Activity   Alcohol Use Yes    Alcohol/week: 2.0 - 6.0 standard drinks of alcohol    Types: 1 - 3 Glasses of wine, 1 - 3 Shots of liquor per week    Comment: Not daily, sometimes weekly or longer     Social History     Substance and Sexual Activity   Drug Use Yes    Types: Marijuana    Comment: Per pt daily use of medical marijuana - has card     Social History     Tobacco Use   Smoking Status Every Day   Smokeless Tobacco Never   Tobacco Comments    Smokes marijuana - has medical card - smokes daily        ALLERGIES:  Allergies   Allergen Reactions    Atorvastatin GI Intolerance     Pt reports having liver testing elevated     Crestor [Rosuvastatin] Other (See Comments)     Upset stomach and diet       MEDICATIONS:  All current active medications have been reviewed.      PHYSICAL EXAM:  Vitals:    03/06/24 0826   Resp: 18   Weight: 55.8 kg (123 lb)   Height: 5' 1\" (1.549 m)         General Appearance:  Appearing well, nontoxic, and in no distress   Head:  Normocephalic, without obvious abnormality, atraumatic   Eyes:  Conjunctiva pink and sclera anicteric, both eyes   Nose: Nares normal, mucosa normal, no drainage   Throat: Oropharynx moist without lesions   Neck: Supple, symmetrical, no adenopathy, no tenderness/mass/nodules   Back:   Symmetric, no curvature, " "ROM normal, no CVA tenderness   Lungs:   Clear to auscultation bilaterally, respirations unlabored   Chest Wall:  No tenderness or deformity   Heart:  RRR; no murmur, rub or gallop   Abdomen:   Soft, non-tender, non-distended, positive bowel sounds,    Extremities: No cyanosis, clubbing or edema   Skin: No rashes or lesions. No draining wounds noted.   Lymph nodes: Cervical, supraclavicular nodes normal   Neurologic: Alert and oriented times 3, extremity strength 5/5 and symmetric       LABS, IMAGING, & OTHER STUDIES:  Lab Results:  I have personally reviewed pertinent labs.  Lab Results   Component Value Date    K 3.6 03/04/2024     03/04/2024    CO2 25 03/04/2024    BUN 17 03/04/2024    CREATININE 0.82 03/04/2024    GLUF 94 03/04/2024    CALCIUM 9.1 03/04/2024    AST 28 03/04/2024    ALT 24 03/04/2024    ALKPHOS 65 03/04/2024    EGFR 81 03/04/2024     Lab Results   Component Value Date    WBC 3.55 (L) 03/04/2024    HGB 11.8 03/04/2024    HCT 36.0 03/04/2024    MCV 94 03/04/2024     03/04/2024   No results found for: \"SEDRATE\"    Prior HIV testing personally reviewed     "

## 2024-03-06 NOTE — PATIENT INSTRUCTIONS
This is likely a false positive from an antibody your body produced that looks similar to the HIV antibody  Your confirmatory testing has always been negative  We will check the PCR that looks for the viral genetic material to be sure!

## 2024-03-07 ENCOUNTER — HOSPITAL ENCOUNTER (OUTPATIENT)
Dept: MAMMOGRAPHY | Facility: MEDICAL CENTER | Age: 54
Discharge: HOME | End: 2024-03-07
Payer: COMMERCIAL

## 2024-03-07 ENCOUNTER — OFFICE VISIT (OUTPATIENT)
Dept: PHYSICAL THERAPY | Facility: CLINIC | Age: 54
End: 2024-03-07
Payer: COMMERCIAL

## 2024-03-07 VITALS — WEIGHT: 123 LBS | BODY MASS INDEX: 23.22 KG/M2 | HEIGHT: 61 IN

## 2024-03-07 DIAGNOSIS — M77.11 LATERAL EPICONDYLITIS, RIGHT ELBOW: ICD-10-CM

## 2024-03-07 DIAGNOSIS — Z12.31 ENCOUNTER FOR SCREENING MAMMOGRAM FOR BREAST CANCER: ICD-10-CM

## 2024-03-07 DIAGNOSIS — M25.521 RIGHT ELBOW PAIN: Primary | ICD-10-CM

## 2024-03-07 PROCEDURE — 77063 BREAST TOMOSYNTHESIS BI: CPT

## 2024-03-07 PROCEDURE — 97110 THERAPEUTIC EXERCISES: CPT

## 2024-03-07 PROCEDURE — 77067 SCR MAMMO BI INCL CAD: CPT

## 2024-03-07 PROCEDURE — 97112 NEUROMUSCULAR REEDUCATION: CPT

## 2024-03-07 PROCEDURE — 97010 HOT OR COLD PACKS THERAPY: CPT

## 2024-03-07 NOTE — PROGRESS NOTES
"Discharge Summary     Today's date: 3/7/2024  Patient name: Aurora Antoine  : 1970  MRN: [id number]  Referring provider: Candy Alonos MD  Dx:   Encounter Diagnosis     ICD-10-CM    1. Right elbow pain  M25.521       2. Lateral epicondylitis, right elbow  M77.11                        Subjective: Patient reports feeling like she's ready to be discharged from therapy, able to perform all functional activities without pinching, back to lifting. She feels like residual soreness is just from it being bothersome for so long.       Objective: See treatment diary below      Assessment: Patient demonstrates improvement in functional activities and has decreased symptoms since beginning therapy. Patient is able to perform all exercises without adverse reaction and demonstrates good form. At this time, patient is appropriate and agreeable to discharge. Patient provided with updated HEP and advised to call with any questions/concerns, verbalizes understanding.      Goals  STG  1. Patient will be independent with completion of HEP throughout therapy Met  2. Patient will have at worst 5/10 pain so patient can sleep with less discomfort in 3 weeks. Met  3. Patient will have no pain with AROM wrist extension/forearm supination in 3 weeks. Met  LTG  1. Patient will reach arm overhead without increase in symptoms in 4 weeks. Met  2. Patient will open objects without increase in symptoms in 6 weeks. Met  3. Patient will lift objects without increase in pain in 6 weeks. Met      Plan: Discharge POC.      Precautions: none     Manuals  3/7   R extensor/ supinator mx work           MFD & IASTM           R radial mobs           Lateral epicondyle MWM CS CS CS        Radial head mobs  CS CS CS  JK     Reassess CS          STM right bicep   CS        Neuro Re-Ed           Supinateddo radial/ulnar deviation           3-way Bodyblade           Scap 4   Black rows and low rows, 10x3\" ea 15# rows, low " "rows  15# rows, low rows 17.5# low rows 2x10 and rows 21# rows 2x10, 17.5# low rows 3x10, 2x10 W's 10#    Alt shoulder taps modified plantigrade    2x20 alt  2x20 alt 3x20 alt 3x20 on floor alt    Standing ITY    2x10 3# YT   2x10 5# ITY 2x10 7# ITY   Banded b/l ER        3x10 gtb   Horizontal abd        2x10 gtb   Ther Ex 1/30          Wrist extension/ supination iso Grn bar x20 ea Grn bar x20 ea Grn bar 2x20 ea Grn bar 2x20 ea       Wrist extensor stretch 20\"x5 and flexion  20\"x2 flex 3x30\" flex 3x30\" flex       Doorway pec stretch Palm down 30\"x3 Palm down 30\"x3 Palm down 30x3\" Palm down 3x30\"       Hammer curls 3# 2x10 4# 3x10 5# 3x10 7# 3x10       Wrist extension & flexion 2# 2x10 ext slow ecc 3# 2x10 ext slow ecc 4# 2x10 ext slow ecc 5# 2x10 ext slow ecc                  Web ext           Doorway triceps stretch 20\"x5 20\"x5 20\"x3 20x3\"       Chin tucks w/ pt overpressure      25' 2X10  X10 pt OP and w/ ext 2x10 w/ ext              Modalities 1/30 2/1 2/6 2/13       MHP 10' 10' 8' 8'                                     "

## 2024-03-08 DIAGNOSIS — I10 ESSENTIAL HYPERTENSION: ICD-10-CM

## 2024-03-08 LAB
HGB FRACT BLD-IMP: NEGATIVE
HIV 2 RNA SERPL QL NAA+PROBE: NON REACTIVE
HIV IA ALGORITHM INTERP SERPLBLD-IMP: NORMAL
HIV1 AB SERPL QL IA: NON REACTIVE
HIV1 RNA # SERPL NAA+PROBE: <20 COPIES/ML
HIV1 RNA SERPL NAA+PROBE-LOG#: NORMAL LOG10COPY/ML
HIV1 RNA SERPL QL NAA+PROBE: NON REACTIVE
SL AMB HIV 2 AB: NON REACTIVE

## 2024-03-08 RX ORDER — AMLODIPINE BESYLATE 5 MG/1
5 TABLET ORAL DAILY
Qty: 90 TABLET | Refills: 1 | Status: SHIPPED | OUTPATIENT
Start: 2024-03-08

## 2024-04-18 ENCOUNTER — OFFICE VISIT (OUTPATIENT)
Dept: FAMILY MEDICINE CLINIC | Facility: CLINIC | Age: 54
End: 2024-04-18
Payer: COMMERCIAL

## 2024-04-18 VITALS
HEIGHT: 61 IN | WEIGHT: 122.2 LBS | SYSTOLIC BLOOD PRESSURE: 118 MMHG | TEMPERATURE: 96.9 F | DIASTOLIC BLOOD PRESSURE: 80 MMHG | OXYGEN SATURATION: 99 % | BODY MASS INDEX: 23.07 KG/M2 | HEART RATE: 61 BPM

## 2024-04-18 DIAGNOSIS — Z00.00 ANNUAL PHYSICAL EXAM: Primary | ICD-10-CM

## 2024-04-18 DIAGNOSIS — E78.5 DYSLIPIDEMIA: ICD-10-CM

## 2024-04-18 DIAGNOSIS — R73.03 PREDIABETES: ICD-10-CM

## 2024-04-18 PROCEDURE — 99396 PREV VISIT EST AGE 40-64: CPT | Performed by: FAMILY MEDICINE

## 2024-04-18 NOTE — PROGRESS NOTES
ADULT ANNUAL PHYSICAL  [facility]    NAME: Aurora Antoine  AGE: 53 y.o. SEX: female  : 1970   DATE: 2024     Assessment and Plan:     Problem List Items Addressed This Visit    None  Visit Diagnoses       Annual physical exam    -  Primary    Dyslipidemia        Relevant Orders    Lipid Panel with Direct LDL reflex    Prediabetes        Relevant Orders    Hemoglobin A1C          - Satisfactory physical examination   - Per chart review last pap smear was in  and was normal  - Patient up to date with breast cancer screening and colon cancer screening   - Follow up in 6 months or as needed         Return in about 6 months (around 10/18/2024) for Next scheduled follow up.     Chief Complaint:     Chief Complaint   Patient presents with    Annual Exam      History of Present Illness:     Adult Annual Physical   Aurora Antoine is a very pleasant 53 year old female who presents today for a comprehensive physical exam. Overall she feels well. She does admit to being under some stress at home with her youngest son who has been having issues with mental health and was taken to the ED yesterday for a psychiatric evaluation. Apart from that she endorses no other complaints at this time. She would like to get repeat lab testing to monitor her prediabetes and cholesterol.     Diet and Physical Activity  Diet/Nutrition: well balanced diet.   Exercise: vigorous cardiovascular exercise; patient works as a      Depression Screening  PHQ-2/9 Depression Screening    Little interest or pleasure in doing things: 0 - not at all  Feeling down, depressed, or hopeless: 0 - not at all  PHQ-2 Score: 0  PHQ-2 Interpretation: Negative depression screen       General Health  Sleep:  Sleep varies .   Hearing:  Does not require use of hearing aids .  Vision: goes for regular eye exams.   Dental: regular dental visits.       /GYN Health  Follows with gynecology?  yes   Last menstrual period: No LMP recorded. Patient is premenopausal.       Review of Systems:     Review of Systems   Constitutional: Negative.    HENT: Negative.     Eyes: Negative.    Respiratory: Negative.     Cardiovascular: Negative.    Gastrointestinal: Negative.    Genitourinary: Negative.    Musculoskeletal: Negative.    Skin: Negative.    Neurological: Negative.    Psychiatric/Behavioral: Negative.        Past Medical History:     Past Medical History:   Diagnosis Date    Anemia     iron deficiency anemia    Anxiety     Bradycardia     Bradycardia     Chronic diarrhea     Chronic left shoulder pain     Chronic nasal congestion     Depression     Disease of pericardium     Elevated LFTs     Excessive daytime sleepiness     Hammer toe of right foot     Hemorrhoids     History of COVID-19     3/2022    Hypercholesterolemia     Hypertension     Hypotension     Insomnia     Leukopenia     Low back pain     Monoclonal gammopathy     Nonrheumatic aortic (valve) insufficiency     Prediabetes     Restless leg syndrome     Sleep apnea     Urinary frequency       Past Surgical History:     Past Surgical History:   Procedure Laterality Date    COLONOSCOPY      HEMORROIDECTOMY      MASTOPEXY Bilateral     2020    SD CORRJ HLX VLGS BNCTY SESMDC DSTL METAR OSTEOT Left 02/17/2023    Procedure: BUNIONECTOMY KELTON LEFT FOOT;  Surgeon: Delonte Trinidad DPM;  Location:  MAIN OR;  Service: Podiatry    TOE OSTEOTOMY Left 02/17/2023    Procedure: OSTEOTOMY OF PROXIMAL PHALANX LEFT GREAT TOE;  Surgeon: Delonte Trinidad DPM;  Location:  MAIN OR;  Service: Podiatry    WISDOM TOOTH EXTRACTION      WRIST SURGERY Right       Social History:     Social History     Socioeconomic History    Marital status:      Spouse name: None    Number of children: None    Years of education: None    Highest education level: None   Occupational History    None   Tobacco Use    Smoking status: Every Day    Smokeless tobacco: Never    Tobacco comments:      Smokes marijuana - has medical card - smokes daily    Vaping Use    Vaping status: Never Used   Substance and Sexual Activity    Alcohol use: Yes     Alcohol/week: 2.0 - 6.0 standard drinks of alcohol     Types: 1 - 3 Glasses of wine, 1 - 3 Shots of liquor per week     Comment: Not daily, sometimes weekly or longer    Drug use: Yes     Types: Marijuana     Comment: Per pt daily use of medical marijuana - has card    Sexual activity: Yes     Partners: Male     Birth control/protection: I.U.D.     Comment: Mirena IUD in place - denies any chest pain or shortness of breath with activity   Other Topics Concern    None   Social History Narrative    None     Social Determinants of Health     Financial Resource Strain: Low Risk  (3/21/2023)    Overall Financial Resource Strain (CARDIA)     Difficulty of Paying Living Expenses: Not very hard   Food Insecurity: No Food Insecurity (3/21/2023)    Hunger Vital Sign     Worried About Running Out of Food in the Last Year: Never true     Ran Out of Food in the Last Year: Never true   Transportation Needs: No Transportation Needs (3/21/2023)    PRAPARE - Transportation     Lack of Transportation (Medical): No     Lack of Transportation (Non-Medical): No   Physical Activity: Sufficiently Active (3/8/2022)    Exercise Vital Sign     Days of Exercise per Week: 3 days     Minutes of Exercise per Session: 60 min   Stress: No Stress Concern Present (3/8/2022)    [employer] [place] [employer] - Occupational Stress Questionnaire     Feeling of Stress : Not at all   Social Connections: Not on file   Intimate Partner Violence: Not At Risk (3/8/2022)    Humiliation, Afraid, Rape, and Kick questionnaire     Fear of Current or Ex-Partner: No     Emotionally Abused: No     Physically Abused: No     Sexually Abused: No   Housing Stability: Low Risk  (3/8/2022)    Housing Stability Vital Sign     Unable to Pay for Housing in the Last Year: No     Number of Places Lived in the Last  "Year: 1     Unstable Housing in the Last Year: No      Family History:     Family History   Problem Relation Age of Onset    No Known Problems Mother     No Known Problems Father     No Known Problems Sister     No Known Problems Sister     No Known Problems Daughter     Hypertension Maternal Grandmother     No Known Problems Maternal Grandfather     Diabetes Paternal Grandmother     No Known Problems Paternal Grandfather     No Known Problems Son     No Known Problems Son     No Known Problems Son     No Known Problems Son     Anesthesia problems Neg Hx     Breast cancer Neg Hx       Current Medications:     Current Outpatient Medications   Medication Sig Dispense Refill    amLODIPine (NORVASC) 5 mg tablet Take 1 tablet by mouth once daily 90 tablet 1    levonorgestrel (MIRENA) 20 MCG/24HR IUD 1 each by Intrauterine route once        Multiple Vitamin (multivitamin) tablet Take 1 tablet by mouth daily Alive MVI      NON FORMULARY in the morning 2/16/23 per pt - takes oregano oil      Pseudoephedrine-guaiFENesin (GUAIFENESIN-P PO) Take by mouth      GUAIFENESIN PO Take 400 mg by mouth 2 (two) times a day as needed       No current facility-administered medications for this visit.      Allergies:     Allergies   Allergen Reactions    Atorvastatin GI Intolerance     Pt reports having liver testing elevated     Crestor [Rosuvastatin] Other (See Comments)     Upset stomach and diet      Physical Exam:     /80 (BP Location: Left arm, Patient Position: Sitting, Cuff Size: Adult)   Pulse 61   Temp (!) 96.9 °F (36.1 °C) (Temporal)   Ht 5' 1\" (1.549 m)   Wt 55.4 kg (122 lb 3.2 oz)   SpO2 99%   BMI 23.09 kg/m²     Physical Exam  Constitutional:       General: She is not in acute distress.     Appearance: She is not ill-appearing.   HENT:      Head: Normocephalic and atraumatic.      Mouth/Throat:      Mouth: Mucous membranes are moist.   Eyes:      General:         Right eye: No discharge.         Left eye: No " discharge.      Extraocular Movements: Extraocular movements intact.      Pupils: Pupils are equal, round, and reactive to light.   Cardiovascular:      Rate and Rhythm: Normal rate.   Pulmonary:      Effort: Pulmonary effort is normal. No respiratory distress.      Breath sounds: No wheezing.   Abdominal:      General: Bowel sounds are normal. There is no distension.      Palpations: Abdomen is soft.      Tenderness: There is no abdominal tenderness.   Musculoskeletal:      Right lower leg: No edema.      Left lower leg: No edema.   Neurological:      General: No focal deficit present.      Mental Status: She is alert.      Motor: No weakness.      Coordination: Coordination normal.      Gait: Gait normal.      Deep Tendon Reflexes: Reflexes normal.   Psychiatric:         Mood and Affect: Mood normal.         Behavior: Behavior normal.          Candy Alonso MD  [place]

## 2024-05-29 ENCOUNTER — APPOINTMENT (EMERGENCY)
Dept: RADIOLOGY | Facility: HOSPITAL | Age: 54
End: 2024-05-29
Payer: COMMERCIAL

## 2024-05-29 ENCOUNTER — HOSPITAL ENCOUNTER (EMERGENCY)
Facility: HOSPITAL | Age: 54
Discharge: HOME | End: 2024-05-29
Attending: EMERGENCY MEDICINE
Payer: COMMERCIAL

## 2024-05-29 VITALS
OXYGEN SATURATION: 98 % | SYSTOLIC BLOOD PRESSURE: 159 MMHG | DIASTOLIC BLOOD PRESSURE: 70 MMHG | TEMPERATURE: 97.9 F | RESPIRATION RATE: 16 BRPM

## 2024-05-29 DIAGNOSIS — M79.675 TOE PAIN, LEFT: Primary | ICD-10-CM

## 2024-05-29 PROCEDURE — 73660 X-RAY EXAM OF TOE(S): CPT

## 2024-05-29 PROCEDURE — 99283 EMERGENCY DEPT VISIT LOW MDM: CPT

## 2024-05-29 PROCEDURE — 99284 EMERGENCY DEPT VISIT MOD MDM: CPT

## 2024-05-30 NOTE — DISCHARGE INSTRUCTIONS
May use Tylenol and Motrin for pain. Follow-up with PCP and return to ED for any worsening symptoms.

## 2024-05-30 NOTE — ED PROVIDER NOTES
History  Chief Complaint   Patient presents with    Toe Pain     Pt hit toe on heel of boot.  Pt now with swelling and pain.     Patient is a 53-year-old female with a past medical history including anemia, hypertension, and prediabetes.  Presents today for evaluation of left toe pain.  Patient states that she was rushing getting ready when she hit her left third toe off of the boot in her closet.  States that the toe is now painful to move with slight swelling.  No medications taken prior to arrival.       Toe Pain  Associated symptoms: no chest pain, no fever and no shortness of breath        Prior to Admission Medications   Prescriptions Last Dose Informant Patient Reported? Taking?   GUAIFENESIN PO  Self Yes No   Sig: Take 400 mg by mouth 2 (two) times a day as needed   Multiple Vitamin (multivitamin) tablet  Self Yes No   Sig: Take 1 tablet by mouth daily Alive MVI   NON FORMULARY  Self Yes No   Sig: in the morning 23 per pt - takes oregano oil   Pseudoephedrine-guaiFENesin (GUAIFENESIN-P PO)  Self Yes No   Sig: Take by mouth   amLODIPine (NORVASC) 5 mg tablet  Self No No   Sig: Take 1 tablet by mouth once daily   levonorgestrel (MIRENA) 20 MCG/24HR IUD  Self Yes No   Si each by Intrauterine route once        Facility-Administered Medications: None       Past Medical History:   Diagnosis Date    Anemia     iron deficiency anemia    Anxiety     Bradycardia     Bradycardia     Chronic diarrhea     Chronic left shoulder pain     Chronic nasal congestion     Depression     Disease of pericardium     Elevated LFTs     Excessive daytime sleepiness     Hammer toe of right foot     Hemorrhoids     History of COVID-19     3/2022    Hypercholesterolemia     Hypertension     Hypotension     Insomnia     Leukopenia     Low back pain     Monoclonal gammopathy     Nonrheumatic aortic (valve) insufficiency     Prediabetes     Restless leg syndrome     Sleep apnea     Urinary frequency        Past Surgical History:    Procedure Laterality Date    COLONOSCOPY      HEMORROIDECTOMY      MASTOPEXY Bilateral     2020    CA CORRJ HLX VLGS BNCTY SESMDC DSTL METAR OSTEOT Left 02/17/2023    Procedure: BUNIONECTOMY KELTON LEFT FOOT;  Surgeon: Delonte Trinidad DPM;  Location:  MAIN OR;  Service: Podiatry    TOE OSTEOTOMY Left 02/17/2023    Procedure: OSTEOTOMY OF PROXIMAL PHALANX LEFT GREAT TOE;  Surgeon: Delonte Trinidad DPM;  Location:  MAIN OR;  Service: Podiatry    WISDOM TOOTH EXTRACTION      WRIST SURGERY Right        Family History   Problem Relation Age of Onset    No Known Problems Mother     No Known Problems Father     No Known Problems Sister     No Known Problems Sister     No Known Problems Daughter     Hypertension Maternal Grandmother     No Known Problems Maternal Grandfather     Diabetes Paternal Grandmother     No Known Problems Paternal Grandfather     No Known Problems Son     No Known Problems Son     No Known Problems Son     No Known Problems Son     Anesthesia problems Neg Hx     Breast cancer Neg Hx      I have reviewed and agree with the history as documented.    E-Cigarette/Vaping    E-Cigarette Use Never User     Comments Denies any use      E-Cigarette/Vaping Substances    Nicotine No     THC No     CBD No     Flavoring No      Social History     Tobacco Use    Smoking status: Every Day    Smokeless tobacco: Never    Tobacco comments:     Smokes marijuana - has medical card - smokes daily    Vaping Use    Vaping status: Never Used   Substance Use Topics    Alcohol use: Yes     Alcohol/week: 2.0 - 6.0 standard drinks of alcohol     Types: 1 - 3 Glasses of wine, 1 - 3 Shots of liquor per week     Comment: Not daily, sometimes weekly or longer    Drug use: Yes     Types: Marijuana     Comment: Per pt daily use of medical marijuana - has card       Review of Systems   Constitutional:  Negative for chills and fever.   Respiratory:  Negative for chest tightness and shortness of breath.    Cardiovascular:  Negative for chest  pain and palpitations.   Musculoskeletal:  Positive for arthralgias (left toe).   All other systems reviewed and are negative.      Physical Exam  Physical Exam  Vitals and nursing note reviewed.   Constitutional:       Appearance: Normal appearance.   HENT:      Head: Normocephalic and atraumatic.   Cardiovascular:      Rate and Rhythm: Normal rate and regular rhythm.      Pulses: Normal pulses.      Heart sounds: Normal heart sounds.   Pulmonary:      Effort: Pulmonary effort is normal.      Breath sounds: Normal breath sounds.   Musculoskeletal:         General: Tenderness present.      Left foot: Normal capillary refill. Tenderness present. No swelling. Normal pulse.      Comments: Tenderness upon palpation of left 3rd toe. Patient able to curl and extend toe but reports pain. 2+ pedal and DP. Sensation intact.   Skin:     General: Skin is warm and dry.      Capillary Refill: Capillary refill takes less than 2 seconds.   Neurological:      General: No focal deficit present.      Mental Status: She is alert.   Psychiatric:         Mood and Affect: Mood normal.         Behavior: Behavior normal.         Vital Signs  ED Triage Vitals   Temperature Pulse Respirations Blood Pressure SpO2   05/29/24 2054 -- 05/29/24 2054 05/29/24 2056 05/29/24 2056   97.9 °F (36.6 °C)  16 159/70 98 %      Temp Source Heart Rate Source Patient Position - Orthostatic VS BP Location FiO2 (%)   05/29/24 2054 05/29/24 2054 05/29/24 2054 05/29/24 2054 --   Oral Monitor Sitting Right arm       Pain Score       05/29/24 2054       5           Vitals:    05/29/24 2054 05/29/24 2056   BP:  159/70   Patient Position - Orthostatic VS: Sitting          Visual Acuity      ED Medications  Medications - No data to display    Diagnostic Studies  Results Reviewed       None                   XR toe third min 2 views LEFT   ED Interpretation by WALTER Monterroso (05/29 2142)   No obvious fracture noted.                 Procedures  Procedures          ED Course                               SBIRT 20yo+      Flowsheet Row Most Recent Value   Initial Alcohol Screen: US AUDIT-C     1. How often do you have a drink containing alcohol? 0 Filed at: 05/29/2024 2107   2. How many drinks containing alcohol do you have on a typical day you are drinking?  0 Filed at: 05/29/2024 2107   3b. FEMALE Any Age, or MALE 65+: How often do you have 4 or more drinks on one occassion? 0 Filed at: 05/29/2024 2107   Audit-C Score 0 Filed at: 05/29/2024 2107   SHAKILA: How many times in the past year have you...    Used an illegal drug or used a prescription medication for non-medical reasons? Never Filed at: 05/29/2024 2107                      Medical Decision Making  Patient is a 53-year-old female presenting for evaluation of left toe pain.  Discussed with patient treatment plan to include an x-ray of the toe to rule out fracture.  Pain medications denies offered and patient declined at this time.    No obvious fracture or abnormality noted on my interpretation of x-ray. Discussed result with patient. Encouraged Tylenol and Motrin for pain and may apply ice as needed for pain relief and swelling. Encouraged follow-up with PCP and to return to ED for any worsening symptoms.     Amount and/or Complexity of Data Reviewed  Radiology: ordered and independent interpretation performed.     Details: Reviewed.              Disposition  Final diagnoses:   Toe pain, left     Time reflects when diagnosis was documented in both MDM as applicable and the Disposition within this note       Time User Action Codes Description Comment    5/29/2024  9:43 PM Nuvia Agosto Add [M79.675] Toe pain, left           ED Disposition       ED Disposition   Discharge    Condition   Stable    Date/Time   Wed May 29, 2024 2142    Comment   Aurora Antoine discharge to home/self care.                   Follow-up Information       Follow up With Specialties Details Why Contact Info Additional Information    Vera  MD [facility] Call in 1 week  [address]  [address]  [place] [id number] [phone]       [facility] Emergency Department Emergency Medicine  If symptoms worsen [address] [id number] [phone] [facility] Emergency Department, [address], [place], [address]            Discharge Medication List as of 5/29/2024  9:43 PM        CONTINUE these medications which have NOT CHANGED    Details   amLODIPine (NORVASC) 5 mg tablet Take 1 tablet by mouth once daily, Starting Fri 3/8/2024, Normal      GUAIFENESIN PO Take 400 mg by mouth 2 (two) times a day as needed, Historical Med      levonorgestrel (MIRENA) 20 MCG/24HR IUD 1 each by Intrauterine route once  , Historical Med      Multiple Vitamin (multivitamin) tablet Take 1 tablet by mouth daily Alive MVI, Historical Med      NON FORMULARY in the morning 2/16/23 per pt - takes oregano oil, Historical Med      Pseudoephedrine-guaiFENesin (GUAIFENESIN-P PO) Take by mouth, Historical Med             No discharge procedures on file.    PDMP Review         Value Time User    PDMP Reviewed  Yes 12/28/2020 10:00 AM WALTER Schreiber            ED Provider  Electronically Signed by             WALTER Monterroso  05/29/24 8069

## 2024-09-10 ENCOUNTER — TELEPHONE (OUTPATIENT)
Age: 54
End: 2024-09-10

## 2024-09-10 NOTE — TELEPHONE ENCOUNTER
Patient daughter is calling because she had some question and concerns about her mother who has been admitted to the [facility]. Please follow up and advise thank you in advance

## 2024-09-11 NOTE — TELEPHONE ENCOUNTER
Looks like she's hospitalized at [facility], plus she signed a 201 for voluntary admission. Not much I can do I'm afraid

## 2024-09-11 NOTE — TELEPHONE ENCOUNTER
I called the patient's daughter to get more information.  She is not listed on Aurora's medical consent, but Aurora wanted to relay a message to the doctor.  She feels she doesn't belong in the psychiatric [facility] and does not want to be there.  I said I would relay the message, but I don't know if Dr. Alonso can do anything about that?  She understood and did think this as well. No other phone call to the daughter is needed.

## 2024-10-15 ENCOUNTER — APPOINTMENT (OUTPATIENT)
Dept: LAB | Facility: HOSPITAL | Age: 54
End: 2024-10-15
Payer: COMMERCIAL

## 2024-10-15 DIAGNOSIS — R73.03 PREDIABETES: ICD-10-CM

## 2024-10-15 DIAGNOSIS — E78.5 DYSLIPIDEMIA: ICD-10-CM

## 2024-10-15 LAB
CHOLEST SERPL-MCNC: 238 MG/DL
EST. AVERAGE GLUCOSE BLD GHB EST-MCNC: 131 MG/DL
HBA1C MFR BLD: 6.2 %
HDLC SERPL-MCNC: 78 MG/DL
LDLC SERPL CALC-MCNC: 147 MG/DL (ref 0–100)
TRIGL SERPL-MCNC: 65 MG/DL

## 2024-10-15 PROCEDURE — 36415 COLL VENOUS BLD VENIPUNCTURE: CPT

## 2024-10-15 PROCEDURE — 80061 LIPID PANEL: CPT

## 2024-10-15 PROCEDURE — 83036 HEMOGLOBIN GLYCOSYLATED A1C: CPT

## 2024-10-16 ENCOUNTER — TELEPHONE (OUTPATIENT)
Dept: FAMILY MEDICINE CLINIC | Facility: CLINIC | Age: 54
End: 2024-10-16

## 2024-10-16 NOTE — TELEPHONE ENCOUNTER
I left a message for patient to called back to help her reschedule her apt for 10/18 since Dr Alonso will not be in the office on this date

## 2024-10-25 ENCOUNTER — OFFICE VISIT (OUTPATIENT)
Dept: FAMILY MEDICINE CLINIC | Facility: CLINIC | Age: 54
End: 2024-10-25
Payer: COMMERCIAL

## 2024-10-25 VITALS
SYSTOLIC BLOOD PRESSURE: 118 MMHG | HEIGHT: 61 IN | WEIGHT: 120.4 LBS | DIASTOLIC BLOOD PRESSURE: 68 MMHG | OXYGEN SATURATION: 96 % | BODY MASS INDEX: 22.73 KG/M2 | HEART RATE: 46 BPM

## 2024-10-25 DIAGNOSIS — I10 ESSENTIAL HYPERTENSION: Primary | ICD-10-CM

## 2024-10-25 DIAGNOSIS — G44.52 NDPH (NEW DAILY PERSISTENT HEADACHE): ICD-10-CM

## 2024-10-25 DIAGNOSIS — F32.A ANXIETY AND DEPRESSION: ICD-10-CM

## 2024-10-25 DIAGNOSIS — F41.9 ANXIETY AND DEPRESSION: ICD-10-CM

## 2024-10-25 DIAGNOSIS — R41.89 BRAIN FOG: ICD-10-CM

## 2024-10-25 DIAGNOSIS — K52.9 CHRONIC DIARRHEA: ICD-10-CM

## 2024-10-25 DIAGNOSIS — E78.00 PURE HYPERCHOLESTEROLEMIA: ICD-10-CM

## 2024-10-25 DIAGNOSIS — R73.03 PREDIABETES: ICD-10-CM

## 2024-10-25 DIAGNOSIS — R09.81 CHRONIC NASAL CONGESTION: ICD-10-CM

## 2024-10-25 PROCEDURE — 99214 OFFICE O/P EST MOD 30 MIN: CPT | Performed by: FAMILY MEDICINE

## 2024-10-25 RX ORDER — OLANZAPINE 10 MG/1
10 TABLET ORAL
COMMUNITY
Start: 2024-10-16

## 2024-10-25 RX ORDER — TRAZODONE HYDROCHLORIDE 50 MG/1
50 TABLET, FILM COATED ORAL
COMMUNITY
Start: 2024-10-16

## 2024-10-25 RX ORDER — HYDROXYZINE HYDROCHLORIDE 25 MG/1
25 TABLET, FILM COATED ORAL 2 TIMES DAILY PRN
COMMUNITY
Start: 2024-10-16

## 2024-10-25 RX ORDER — HYDROXYZINE PAMOATE 50 MG/1
50 CAPSULE ORAL 2 TIMES DAILY PRN
COMMUNITY
Start: 2024-09-13

## 2024-10-25 RX ORDER — BUSPIRONE HYDROCHLORIDE 7.5 MG/1
1 TABLET ORAL 2 TIMES DAILY
COMMUNITY
Start: 2024-10-16

## 2024-10-26 NOTE — PROGRESS NOTES
Ambulatory Visit  Name: Aurora Antoine      : 1970      MRN: [id number]  Encounter Provider: Candy Alonso MD  Encounter Date: 10/25/2024   Encounter department: [facility]    Assessment & Plan  Essential hypertension  - Well controlled; continue amlodipine 5 mg daily        Prediabetes  Discussed starting metformin in conjunction with continued diet and lifestyle modification and patient is agreeable. Start metformin 500mg daily with breakfast; discussed potential side effects of the medication. Repeat A1C in 3 months   Orders:    metFORMIN (GLUCOPHAGE) 500 mg tablet; Take 1 tablet (500 mg total) by mouth daily with breakfast    Insulin, fasting; Future    Cortisol Level,7-9 AM Specimen; Future    Hemoglobin A1C; Future    Pure hypercholesterolemia  Continue to manage with diet and lifestyle modifications        Anxiety and depression  Continue management per psychiatry         NDPH (new daily persistent headache)  Likely secondary to underlying stress, none the less with order MRI brain to rule out structural abnormality   Orders:    MRI brain wo contrast; Future    Brain fog  Likely related to underlying depression however will order TSH, vitamin B12 and folate to rule out vitamin deficiency and thyroid disorder  Orders:    TSH, 3rd generation with Free T4 reflex; Future    Vitamin B12; Future    Folate; Future    Chronic diarrhea  Notes from GI, previous stool studies and colonoscopy results reviewed; symptoms may be secondary to IBS with diarrhea . Discussed referral to GI for re-evaluation   Orders:    Ambulatory Referral to Gastroenterology; Future    Chronic nasal congestion  Start trial of flonase nasal spray and antihistamine          History of Present Illness     Aurora Antoine is a very pleasant 54 year old female who presents today for a follow up. In September she presented to the [facility] for depression and signed a 201 for inpatient psychiatric admission. She was  "subsequently admitted to [facility] and upon discharge has started following with a therapist and psychiatrist at [facility].She has been started on medication for her depression and is dealing with trauma from her past. She is worried about her recent lab results in particular her prediabetes. Patient does her best to limit her carbohydrate intake and works out regularly but is still having a hard time controlling her A1C. She believes that it may be related to her underlying stress and cortisol levels. She is open to starting medication to see if it helps to bring her A1C down. She also reports symptoms of brain fog and daily headaches. She states that she does not typically get headaches and again thinks it may be related to her stress but is still concerned there may be an underlying cause of this. She also reports that she has been dealing with diarrhea which has been a chronic issue for the past 8 years. She had seen GI in 2020 and underwent numerous stool testing and a colonoscopy however no cause was identified. She has also been dealing with a lot of nasal congestion and post nasal drip but is not taking anything at this time for her symptoms.       Review of Systems   Constitutional: Negative.    HENT:  Positive for congestion and postnasal drip.    Eyes: Negative.    Respiratory: Negative.     Cardiovascular: Negative.    Gastrointestinal:  Positive for diarrhea.   Genitourinary: Negative.    Musculoskeletal: Negative.    Skin: Negative.    Neurological:  Positive for headaches.        Brain fog   Psychiatric/Behavioral:  Positive for dysphoric mood. The patient is nervous/anxious.            Objective     /68 (BP Location: Left arm, Patient Position: Sitting, Cuff Size: Adult)   Pulse (!) 46   Ht 5' 1\" (1.549 m)   Wt 54.6 kg (120 lb 6.4 oz)   SpO2 96%   BMI 22.75 kg/m²     Physical Exam  Constitutional:       General: She is not in acute distress.     Appearance: She is not " ill-appearing.   HENT:      Head: Normocephalic and atraumatic.   Eyes:      General:         Right eye: No discharge.         Left eye: No discharge.      Extraocular Movements: Extraocular movements intact.   Cardiovascular:      Rate and Rhythm: Bradycardia present.   Pulmonary:      Effort: Pulmonary effort is normal. No respiratory distress.   Neurological:      General: No focal deficit present.      Mental Status: She is alert.   Psychiatric:         Mood and Affect: Mood is depressed. Affect is tearful.

## 2024-10-26 NOTE — ASSESSMENT & PLAN NOTE
Notes from GI, previous stool studies and colonoscopy results reviewed; symptoms may be secondary to IBS with diarrhea . Discussed referral to GI for re-evaluation   Orders:    Ambulatory Referral to Gastroenterology; Future

## 2024-11-05 NOTE — PROGRESS NOTES
Ambulatory Visit  Name: Aurora Antoine      : 1970      MRN: [id number]  Encounter Provider: Judd Walker MD  Encounter Date: 2024   Encounter department: [facility] [place]    Assessment & Plan  Chronic diarrhea  Chronic diarrhea. No abdominal pain. Evaluated in  s/p negative GI PCR, C diff and giardia, negative celiac serology. S/p colonoscopy in  with negative MC on path. Given recent travel outside of country this year, will r/o Giardia again. Will check for inflammation and fecal pancreatic elastase as well. Diarrhea does not appear to be related to metformin as she had diarrhea prior to initiation of metformin  - obtain stool tests ie fecal calprotectin and pancreatic elastase   - trial of fiber supplementation such as Benefiber (2 teaspoon in 8 oz water TID)  - avoid dairy  - if loose stool not better, trial of Imodium  Orders:    Ambulatory Referral to Gastroenterology    Calprotectin,Fecal; Future    Pancreatic elastase, fecal; Future    Giardia antigen; Future      CRC screening  - repeat in  for screening    History of Present Illness     Aurora Antoine is a 54 y.o. female PMH HTN, preDM, HLD, anxiety/depression, chronic diarrhea, hemorrhoidectomy (2017), who presents chronic diarrhea    Last seen by Dr. Kendrick on 2020 re: chronic diarrhea s/p colonoscopy w/ MC bx - negative for MC. Celiac screening serology negative    Just received steroid injection of her left arm for calcific tendonitis. Feeling better now.   Reports loose stool for 8+ years. BS type 5-7. Sometimes straight diarrhea. No abdominal pain. Bubbling/gurgling sound in her abdomen more so in the past. Not much now. 4-6 BM times daily.  No fevers, chills, sick contact. Reports traveling outside of country this year.  No cig. Occasional EtOH. Heavy EtOH in the past in her 20s (5-6 drinks a night/weekend). Medical marijuana for PTSD/anxeity. She is a .   Med rec  completed  Took ibuprofen recently for left arm. Did not affect her diarrhea.   Started on metformin about a week or two weeks ago.  Stopped using creamer but now using nondairy creamer which does not upset her stomach  No Fhx of cancer  Reports no unintentional weight loss. Her weight fluctuates between 115 and 125 lbs  Wt Readings from Last 10 Encounters:   11/07/24 52.8 kg (116 lb 6.4 oz)   10/25/24 54.6 kg (120 lb 6.4 oz)   04/18/24 55.4 kg (122 lb 3.2 oz)   03/07/24 55.8 kg (123 lb)   03/06/24 55.8 kg (123 lb)   03/04/24 55.8 kg (123 lb)   02/21/24 56.4 kg (124 lb 6.4 oz)   12/01/23 55.4 kg (122 lb 3.2 oz)   10/18/23 55.6 kg (122 lb 9.6 oz)   04/20/23 52.2 kg (115 lb)       Fhx: no FDR/SDR cancer, no IBD      History obtained from : patient  Review of Systems  Current Outpatient Medications on File Prior to Visit   Medication Sig Dispense Refill    amLODIPine (NORVASC) 5 mg tablet Take 1 tablet by mouth once daily 90 tablet 1    busPIRone (BUSPAR) 7.5 mg tablet Take 1 tablet by mouth 2 (two) times a day      FLUoxetine (PROzac) 20 mg capsule Take 20 mg by mouth every morning      hydrOXYzine HCL (ATARAX) 25 mg tablet Take 25 mg by mouth 2 (two) times a day as needed for anxiety      levonorgestrel (MIRENA) 20 MCG/24HR IUD 1 each by Intrauterine route once        metFORMIN (GLUCOPHAGE) 500 mg tablet Take 1 tablet (500 mg total) by mouth daily with breakfast 90 tablet 1    Multiple Vitamin (multivitamin) tablet Take 1 tablet by mouth daily Alive MVI      NON FORMULARY in the morning 2/16/23 per pt - takes oregano oil      OLANZapine (ZyPREXA) 10 mg tablet Take 10 mg by mouth daily at bedtime      Pseudoephedrine-guaiFENesin (GUAIFENESIN-P PO) Take by mouth      traZODone (DESYREL) 50 mg tablet Take 50 mg by mouth daily at bedtime as needed      [DISCONTINUED] hydrOXYzine pamoate (VISTARIL) 50 mg capsule Take 50 mg by mouth 2 (two) times a day as needed      [DISCONTINUED] GUAIFENESIN PO Take 400 mg by mouth 2  (two) times a day as needed       No current facility-administered medications on file prior to visit.          Objective     /62   Pulse (!) 52   Temp (!) 96.2 °F (35.7 °C)   Wt 52.8 kg (116 lb 6.4 oz)   BMI 21.99 kg/m²     Physical Exam  Vitals reviewed.   Constitutional:       Appearance: Normal appearance.   HENT:      Head: Normocephalic and atraumatic.   Cardiovascular:      Rate and Rhythm: Bradycardia present.   Pulmonary:      Effort: Pulmonary effort is normal. No respiratory distress.   Abdominal:      General: There is no distension.      Palpations: Abdomen is soft.      Tenderness: There is no abdominal tenderness.   Musculoskeletal:         General: No swelling.   Skin:     General: Skin is warm and dry.   Neurological:      General: No focal deficit present.      Mental Status: She is alert.

## 2024-11-07 ENCOUNTER — APPOINTMENT (OUTPATIENT)
Dept: LAB | Facility: HOSPITAL | Age: 54
End: 2024-11-07
Payer: COMMERCIAL

## 2024-11-07 ENCOUNTER — OFFICE VISIT (OUTPATIENT)
Dept: GASTROENTEROLOGY | Facility: MEDICAL CENTER | Age: 54
End: 2024-11-07
Payer: COMMERCIAL

## 2024-11-07 VITALS
DIASTOLIC BLOOD PRESSURE: 62 MMHG | SYSTOLIC BLOOD PRESSURE: 101 MMHG | WEIGHT: 116.4 LBS | HEART RATE: 52 BPM | TEMPERATURE: 96.2 F | BODY MASS INDEX: 21.99 KG/M2

## 2024-11-07 DIAGNOSIS — R41.89 BRAIN FOG: ICD-10-CM

## 2024-11-07 DIAGNOSIS — K52.9 CHRONIC DIARRHEA: ICD-10-CM

## 2024-11-07 DIAGNOSIS — R73.03 PREDIABETES: ICD-10-CM

## 2024-11-07 LAB
CORTIS AM PEAK SERPL-MCNC: 13.1 UG/DL (ref 6.7–22.6)
FOLATE SERPL-MCNC: 16.2 NG/ML
INSULIN SERPL-ACNC: 3.78 UIU/ML (ref 1.9–23)
TSH SERPL DL<=0.05 MIU/L-ACNC: 1.44 UIU/ML (ref 0.45–4.5)
VIT B12 SERPL-MCNC: 1009 PG/ML (ref 180–914)

## 2024-11-07 PROCEDURE — 82607 VITAMIN B-12: CPT

## 2024-11-07 PROCEDURE — 82746 ASSAY OF FOLIC ACID SERUM: CPT

## 2024-11-07 PROCEDURE — 82533 TOTAL CORTISOL: CPT

## 2024-11-07 PROCEDURE — 84443 ASSAY THYROID STIM HORMONE: CPT

## 2024-11-07 PROCEDURE — 99243 OFF/OP CNSLTJ NEW/EST LOW 30: CPT | Performed by: INTERNAL MEDICINE

## 2024-11-07 PROCEDURE — 36415 COLL VENOUS BLD VENIPUNCTURE: CPT

## 2024-11-07 PROCEDURE — 83525 ASSAY OF INSULIN: CPT

## 2024-11-07 NOTE — ASSESSMENT & PLAN NOTE
Chronic diarrhea. No abdominal pain. Evaluated in 2020 s/p negative GI PCR, C diff and giardia, negative celiac serology. S/p colonoscopy in 2022 with negative MC on path. Given recent travel outside of country this year, will r/o Giardia again. Will check for inflammation and fecal pancreatic elastase as well. Diarrhea does not appear to be related to metformin as she had diarrhea prior to initiation of metformin  - obtain stool tests ie fecal calprotectin and pancreatic elastase   - trial of fiber supplementation such as Benefiber (2 teaspoon in 8 oz water TID)  - avoid dairy  - if loose stool not better, trial of Imodium  Orders:    Ambulatory Referral to Gastroenterology    Calprotectin,Fecal; Future    Pancreatic elastase, fecal; Future    Giardia antigen; Future

## 2024-11-26 ENCOUNTER — HOSPITAL ENCOUNTER (OUTPATIENT)
Dept: MRI IMAGING | Facility: HOSPITAL | Age: 54
Discharge: HOME | End: 2024-11-26
Payer: COMMERCIAL

## 2024-11-26 ENCOUNTER — APPOINTMENT (OUTPATIENT)
Dept: LAB | Facility: HOSPITAL | Age: 54
End: 2024-11-26
Payer: COMMERCIAL

## 2024-11-26 DIAGNOSIS — G44.52 NDPH (NEW DAILY PERSISTENT HEADACHE): ICD-10-CM

## 2024-11-26 DIAGNOSIS — K52.9 CHRONIC DIARRHEA: ICD-10-CM

## 2024-11-26 PROCEDURE — 70551 MRI BRAIN STEM W/O DYE: CPT

## 2024-12-02 ENCOUNTER — RESULTS FOLLOW-UP (OUTPATIENT)
Dept: FAMILY MEDICINE CLINIC | Facility: CLINIC | Age: 54
End: 2024-12-02

## 2025-01-31 ENCOUNTER — TELEPHONE (OUTPATIENT)
Age: 55
End: 2025-01-31

## 2025-01-31 DIAGNOSIS — F32.A ANXIETY AND DEPRESSION: Primary | ICD-10-CM

## 2025-01-31 DIAGNOSIS — F41.9 ANXIETY AND DEPRESSION: Primary | ICD-10-CM

## 2025-01-31 RX ORDER — ALPRAZOLAM 0.25 MG/1
0.25 TABLET ORAL
Qty: 10 TABLET | Refills: 0 | Status: SHIPPED | OUTPATIENT
Start: 2025-01-31

## 2025-01-31 NOTE — TELEPHONE ENCOUNTER
Pt called today she said her son just passed away and she has not been able to sleep and asked if Dr. Alonso can please prescribe medicine to sleep. She also asked if she can put labs to check her A1c and when she gets that done she will schedule her appt. Please advise [phone] thank you.

## 2025-02-15 DIAGNOSIS — F41.9 ANXIETY AND DEPRESSION: Primary | ICD-10-CM

## 2025-02-15 DIAGNOSIS — R73.03 PREDIABETES: ICD-10-CM

## 2025-02-15 DIAGNOSIS — F32.A ANXIETY AND DEPRESSION: Primary | ICD-10-CM

## 2025-02-15 RX ORDER — LORAZEPAM 0.5 MG/1
0.5 TABLET ORAL
Qty: 15 TABLET | Refills: 0 | Status: SHIPPED | OUTPATIENT
Start: 2025-02-15

## 2025-02-26 ENCOUNTER — APPOINTMENT (OUTPATIENT)
Dept: LAB | Facility: HOSPITAL | Age: 55
End: 2025-02-26
Payer: COMMERCIAL

## 2025-02-26 DIAGNOSIS — D47.2 MONOCLONAL GAMMOPATHY: Primary | ICD-10-CM

## 2025-02-26 DIAGNOSIS — R73.03 PREDIABETES: ICD-10-CM

## 2025-02-26 LAB
EST. AVERAGE GLUCOSE BLD GHB EST-MCNC: 117 MG/DL
HBA1C MFR BLD: 5.7 %
PROT SERPL-MCNC: 7.1 G/DL (ref 6.4–8.4)

## 2025-02-26 PROCEDURE — 36415 COLL VENOUS BLD VENIPUNCTURE: CPT

## 2025-02-26 PROCEDURE — 84155 ASSAY OF PROTEIN SERUM: CPT

## 2025-02-26 PROCEDURE — 83521 IG LIGHT CHAINS FREE EACH: CPT

## 2025-02-26 PROCEDURE — 84165 PROTEIN E-PHORESIS SERUM: CPT

## 2025-02-26 PROCEDURE — 86334 IMMUNOFIX E-PHORESIS SERUM: CPT

## 2025-02-27 LAB
ALBUMIN SERPL ELPH-MCNC: 4.18 G/DL (ref 3.2–5.1)
ALBUMIN SERPL ELPH-MCNC: 62.4 % (ref 48–70)
ALPHA1 GLOB SERPL ELPH-MCNC: 0.24 G/DL (ref 0.15–0.47)
ALPHA1 GLOB SERPL ELPH-MCNC: 3.6 % (ref 1.8–7)
ALPHA2 GLOB SERPL ELPH-MCNC: 0.64 G/DL (ref 0.42–1.04)
ALPHA2 GLOB SERPL ELPH-MCNC: 9.5 % (ref 5.9–14.9)
BETA GLOB ABNORMAL SERPL ELPH-MCNC: 0.4 G/DL (ref 0.31–0.57)
BETA1 GLOB SERPL ELPH-MCNC: 5.9 % (ref 4.7–7.7)
BETA2 GLOB SERPL ELPH-MCNC: 4.1 % (ref 3.1–7.9)
BETA2+GAMMA GLOB SERPL ELPH-MCNC: 0.27 G/DL (ref 0.2–0.58)
GAMMA GLOB ABNORMAL SERPL ELPH-MCNC: 0.97 G/DL (ref 0.4–1.66)
GAMMA GLOB SERPL ELPH-MCNC: 14.5 % (ref 6.9–22.3)
IGG/ALB SER: 1.66 {RATIO} (ref 1.1–1.8)
INTERPRETATION UR IFE-IMP: NORMAL
M PROTEIN 1 MFR SERPL ELPH: 4.7 %
M PROTEIN 1 SERPL ELPH-MCNC: 0.31 G/DL
PROT PATTERN SERPL ELPH-IMP: NORMAL
PROT SERPL-MCNC: 6.7 G/DL (ref 6.4–8.2)

## 2025-02-27 PROCEDURE — 84165 PROTEIN E-PHORESIS SERUM: CPT | Performed by: PATHOLOGY

## 2025-02-27 PROCEDURE — 86334 IMMUNOFIX E-PHORESIS SERUM: CPT | Performed by: PATHOLOGY

## 2025-02-28 LAB
KAPPA LC UR-MCNC: 69.13 MG/L (ref 1.17–86.46)
KAPPA LC/LAMBDA UR: 12.21 {RATIO} (ref 1.83–14.26)
LAMBDA LC UR-MCNC: 5.66 MG/L (ref 0.27–15.21)

## 2025-03-03 ENCOUNTER — OFFICE VISIT (OUTPATIENT)
Dept: HEMATOLOGY ONCOLOGY | Facility: CLINIC | Age: 55
End: 2025-03-03
Payer: COMMERCIAL

## 2025-03-03 VITALS
RESPIRATION RATE: 18 BRPM | BODY MASS INDEX: 22.28 KG/M2 | TEMPERATURE: 97.5 F | SYSTOLIC BLOOD PRESSURE: 118 MMHG | HEART RATE: 55 BPM | DIASTOLIC BLOOD PRESSURE: 76 MMHG | HEIGHT: 61 IN | OXYGEN SATURATION: 99 % | WEIGHT: 118 LBS

## 2025-03-03 DIAGNOSIS — D47.2 MONOCLONAL GAMMOPATHY: Primary | ICD-10-CM

## 2025-03-03 PROCEDURE — 99214 OFFICE O/P EST MOD 30 MIN: CPT | Performed by: INTERNAL MEDICINE

## 2025-03-03 NOTE — PROGRESS NOTES
Name: Aurora Antoine      : 1970      MRN: [id number]  Encounter Provider: Ferny Hernandez MD  Encounter Date: 3/3/2025   Encounter department: [facility]  :  Assessment & Plan  Monoclonal gammopathy  She has well-known diagnosis of IgG kappa gammopathy most likely compatible with MGUS.  Recent blood work continues to show the same gammopathy.  She does not seem to have obvious endorgan damage according to her recent blood work.  Will continue to monitor closely.  Orders:    CBC and differential; Future    Comprehensive metabolic panel; Future    Magnesium; Future    Vitamin B12; Future    IgG, IgA, IgM; Future    Immunoglobulin free LT chains blood; Future    Kappa/Lambda Light Chains Free With Ratio,Urine; Future    IMMUNOFIXATION (ADAMA) AND PROTEIN ELECTROPHORESIS, RANDOM URINE; Future    Protein, Total and Protein Electrophoresis with Immunofixation; Future        Return in about 52 weeks (around 3/2/2026) for Office Visit 20 min, Labs.    History of Present Illness   Chief Complaint   Patient presents with    Follow-up   HPI:     This is a 54-year-old female with a history of bradycardia, hypotension, and 6 pregnancies with 5 full-term deliveries. The patient also seems to have chronic anemia with iron deficiency which is being treated with oral iron supplements on and off.  She also had extensive workup by her PCP  including a serum protein electrophoresis which showed an M spike.  The serum immunofixation showed IgG kappa monoclonal gammopathy at the 0.5 grams/deciliter.  The UPEP was negative for monoclonal protein.       Interval history:  The patient came today for a follow-up visit.  She did complain about multiple comorbid conditions including significant fatigue, dizziness and arthralgia.  She stated that she did go to see the infectious disease team who are reassured her that there is no obvious hint of HIV and that the positive antibody titer is false  positive.  Recent blood work from 2/26/2025 was reviewed with the patient.     Review of Systems   Constitutional:  Positive for appetite change and fatigue. Negative for chills and fever.   HENT:  Negative for ear pain and sore throat.    Eyes:  Negative for pain and visual disturbance.   Respiratory:  Positive for cough. Negative for shortness of breath.    Cardiovascular:  Negative for chest pain and palpitations.   Gastrointestinal:  Positive for diarrhea. Negative for abdominal pain and vomiting.   Genitourinary:  Negative for dysuria and hematuria.   Musculoskeletal:  Positive for arthralgias. Negative for back pain.   Skin:  Negative for color change and rash.   Neurological:  Positive for dizziness and headaches. Negative for seizures and syncope.   Psychiatric/Behavioral:  Positive for sleep disturbance.    All other systems reviewed and are negative.    Medical History Reviewed by provider this encounter:     .  Current Outpatient Medications on File Prior to Visit   Medication Sig Dispense Refill    levonorgestrel (MIRENA) 20 MCG/24HR IUD 1 each by Intrauterine route once        LORazepam (Ativan) 0.5 mg tablet Take 1 tablet (0.5 mg total) by mouth daily at bedtime 15 tablet 0    metFORMIN (GLUCOPHAGE) 500 mg tablet Take 1 tablet (500 mg total) by mouth daily with breakfast 90 tablet 1    Multiple Vitamin (multivitamin) tablet Take 1 tablet by mouth daily Alive MVI      NON FORMULARY in the morning 2/16/23 per pt - takes oregano oil      Pseudoephedrine-guaiFENesin (GUAIFENESIN-P PO) Take by mouth      amLODIPine (NORVASC) 5 mg tablet Take 1 tablet by mouth once daily (Patient not taking: Reported on 3/3/2025) 90 tablet 1    busPIRone (BUSPAR) 7.5 mg tablet Take 1 tablet by mouth 2 (two) times a day (Patient not taking: Reported on 3/3/2025)      FLUoxetine (PROzac) 20 mg capsule Take 20 mg by mouth every morning (Patient not taking: Reported on 3/3/2025)      hydrOXYzine HCL (ATARAX) 25 mg tablet Take 25  "mg by mouth 2 (two) times a day as needed for anxiety (Patient not taking: Reported on 3/3/2025)      OLANZapine (ZyPREXA) 10 mg tablet Take 10 mg by mouth daily at bedtime (Patient not taking: Reported on 3/3/2025)      traZODone (DESYREL) 50 mg tablet Take 50 mg by mouth daily at bedtime as needed (Patient not taking: Reported on 3/3/2025)       No current facility-administered medications on file prior to visit.      Social History     Tobacco Use    Smoking status: Every Day    Smokeless tobacco: Never    Tobacco comments:     Smokes marijuana - has medical card - smokes daily    Vaping Use    Vaping status: Never Used   Substance and Sexual Activity    Alcohol use: Yes     Alcohol/week: 2.0 - 6.0 standard drinks of alcohol     Types: 1 - 3 Glasses of wine, 1 - 3 Shots of liquor per week     Comment: Not daily, sometimes weekly or longer    Drug use: Yes     Types: Marijuana     Comment: Per pt daily use of medical marijuana - has card    Sexual activity: Yes     Partners: Male     Birth control/protection: I.U.D.     Comment: Mirena IUD in place - denies any chest pain or shortness of breath with activity         Objective   /76 (BP Location: Left arm, Patient Position: Sitting, Cuff Size: Adult)   Pulse 55   Temp 97.5 °F (36.4 °C)   Resp 18   Ht 5' 1\" (1.549 m)   Wt 53.5 kg (118 lb)   SpO2 99%   BMI 22.30 kg/m²     Physical Exam  Constitutional:       General: She is not in acute distress.     Appearance: She is well-developed. She is not diaphoretic.   HENT:      Head: Normocephalic and atraumatic.      Nose: Nose normal.   Eyes:      General: No scleral icterus.        Right eye: No discharge.         Left eye: No discharge.      Conjunctiva/sclera: Conjunctivae normal.      Pupils: Pupils are equal, round, and reactive to light.   Neck:      Thyroid: No thyromegaly.      Vascular: No JVD.      Trachea: No tracheal deviation.   Cardiovascular:      Rate and Rhythm: Normal rate and regular " rhythm.      Heart sounds: Normal heart sounds. No murmur heard.     No friction rub.   Pulmonary:      Effort: Pulmonary effort is normal. No respiratory distress.      Breath sounds: Normal breath sounds. No stridor. No wheezing or rales.   Chest:      Chest wall: No tenderness.   Abdominal:      General: There is no distension.      Palpations: Abdomen is soft. There is no hepatomegaly or splenomegaly.      Tenderness: There is no abdominal tenderness. There is no guarding or rebound.   Musculoskeletal:         General: No tenderness or deformity. Normal range of motion.      Cervical back: Normal range of motion and neck supple.   Lymphadenopathy:      Cervical: No cervical adenopathy.   Skin:     General: Skin is warm and dry.      Coloration: Skin is not pale.      Findings: No erythema or rash.   Neurological:      Mental Status: She is alert and oriented to person, place, and time.      Cranial Nerves: No cranial nerve deficit.      Coordination: Coordination normal.      Deep Tendon Reflexes: Reflexes are normal and symmetric.   Psychiatric:         Behavior: Behavior normal.         Thought Content: Thought content normal.         Judgment: Judgment normal.         Labs: I have reviewed the following labs:  Results for orders placed or performed in visit on 02/26/25   KAPPA/LAMBDA LIGHT CHAINS, URINE, RANDOM OR 24 HOUR   Result Value Ref Range    Free [place] Lt Chains,Ur 69.13 1.17 - 86.46 mg/L    Free Lambda Lt Chains,Ur 5.66 0.27 - 15.21 mg/L    Kappa/Lambda Ratio,U 12.21 1.83 - 14.26   Protein, total   Result Value Ref Range    Total Protein 7.1 6.4 - 8.4 g/dL   Protein electrophoresis, serum   Result Value Ref Range    A/G Ratio 1.66 1.10 - 1.80    Albumin Electrophoresis 62.4 48.0 - 70.0 %    Albumin CONC 4.18 3.20 - 5.10 g/dl    Alpha 1 3.6 1.8 - 7.0 %    ALPHA 1 CONC 0.24 0.15 - 0.47 g/dL    Alpha 2 9.5 5.9 - 14.9 %    ALPHA 2 CONC 0.64 0.42 - 1.04 g/dL    Beta-1 5.9 4.7 - 7.7 %    BETA 1 CONC 0.40  "0.31 - 0.57 g/dL    Beta-2 4.1 3.1 - 7.9 %    BETA 2 CONC 0.27 0.20 - 0.58 g/dL    Gamma Globulin 14.5 6.9 - 22.3 %    GAMMA CONC 0.97 0.40 - 1.66 g/dL    M Peak ID 1 4.70 %    M PEAK 1 CONC 0.31 g/dL    Total Protein 6.7 6.4 - 8.2 g/dL    SPEP Interpretation See Comment    Immunofixation, Serum(Reflex Only-Do Not Order)   Result Value Ref Range    Immunofixation Interpretation See Comment      *Note: Due to a large number of results and/or encounters for the requested time period, some results have not been displayed. A complete set of results can be found in Results Review.   No results found for: \"WBC\", \"RBC\", \"HGB\", \"HCT\", \"MCV\", \"MCH\", \"RDW\", \"PLT\", \"NEUTOPHILPCT\", \"BANDSPCT\", \"LYMPHOPCT\", \"MONOPCT\", \"EOSPCT\", \"BASOPCT\", \"IMMGRANS\", \"NEUTROABS\"   Lab Results   Component Value Date/Time    Sodium 137 09/09/2024 06:00 PM    Potassium 4.3 09/09/2024 06:00 PM    Chloride 104 09/09/2024 06:00 PM    Carbon Dioxide 27 09/09/2024 06:00 PM    ANION GAP 6 09/09/2024 06:00 PM    BUN 21 09/09/2024 06:00 PM    Creatinine 0.85 09/09/2024 06:00 PM    Glucose 87 09/09/2024 06:00 PM    Calcium 9.2 09/09/2024 06:00 PM    AST 27 09/09/2024 06:00 PM    ALT 20 09/09/2024 06:00 PM    Alkaline Phosphatase 100 09/09/2024 06:00 PM    Total Protein 7.1 02/26/2025 10:52 AM    Total Protein 6.7 02/26/2025 10:52 AM    Protein, Total 7.5 09/09/2024 06:00 PM    ALBUMIN 4.2 09/09/2024 06:00 PM    Total Bilirubin 0.3 09/09/2024 06:00 PM    eGFRcr 81 09/09/2024 06:00 PM      Lab Results   Component Value Date/Time    SPEP Interpretation See Comment 02/26/2025 10:52 AM    Beta-2 4.1 02/26/2025 10:52 AM    Gamma Globulin 14.5 02/26/2025 10:52 AM              "

## 2025-03-03 NOTE — ASSESSMENT & PLAN NOTE
She has well-known diagnosis of IgG kappa gammopathy most likely compatible with MGUS.  Recent blood work continues to show the same gammopathy.  She does not seem to have obvious endorgan damage according to her recent blood work.  Will continue to monitor closely.  Orders:    CBC and differential; Future    Comprehensive metabolic panel; Future    Magnesium; Future    Vitamin B12; Future    IgG, IgA, IgM; Future    Immunoglobulin free LT chains blood; Future    Kappa/Lambda Light Chains Free With Ratio,Urine; Future    IMMUNOFIXATION (ADAMA) AND PROTEIN ELECTROPHORESIS, RANDOM URINE; Future    Protein, Total and Protein Electrophoresis with Immunofixation; Future

## 2025-03-20 ENCOUNTER — APPOINTMENT (OUTPATIENT)
Dept: LAB | Facility: HOSPITAL | Age: 55
End: 2025-03-20
Payer: COMMERCIAL

## 2025-03-20 DIAGNOSIS — D47.2 MONOCLONAL GAMMOPATHY: ICD-10-CM

## 2025-03-21 ENCOUNTER — RESULTS FOLLOW-UP (OUTPATIENT)
Age: 55
End: 2025-03-21

## 2025-03-21 DIAGNOSIS — R73.03 PREDIABETES: Primary | ICD-10-CM

## 2025-03-21 DIAGNOSIS — E78.00 PURE HYPERCHOLESTEROLEMIA: ICD-10-CM

## 2025-03-21 DIAGNOSIS — I10 ESSENTIAL HYPERTENSION: ICD-10-CM

## 2025-03-21 DIAGNOSIS — G47.00 INSOMNIA, UNSPECIFIED TYPE: ICD-10-CM

## 2025-03-21 LAB
CALPROTECTIN STL-MCNC: 11.6 ÂΜG/G
ELASTASE PANC STL-MCNT: >800 UG/G
G LAMBLIA AG STL QL IA: NEGATIVE

## 2025-03-21 RX ORDER — ZOLPIDEM TARTRATE 5 MG/1
5 TABLET ORAL
Qty: 30 TABLET | Refills: 0 | Status: SHIPPED | OUTPATIENT
Start: 2025-03-21

## 2025-03-28 ENCOUNTER — APPOINTMENT (OUTPATIENT)
Dept: LAB | Facility: HOSPITAL | Age: 55
End: 2025-03-28
Payer: COMMERCIAL

## 2025-03-28 DIAGNOSIS — E78.00 PURE HYPERCHOLESTEROLEMIA: ICD-10-CM

## 2025-03-28 DIAGNOSIS — I10 ESSENTIAL HYPERTENSION: ICD-10-CM

## 2025-03-28 LAB
ALBUMIN SERPL BCG-MCNC: 4.4 G/DL (ref 3.5–5)
ALP SERPL-CCNC: 50 U/L (ref 34–104)
ALT SERPL W P-5'-P-CCNC: 9 U/L (ref 7–52)
ANION GAP SERPL CALCULATED.3IONS-SCNC: 8 MMOL/L (ref 4–13)
AST SERPL W P-5'-P-CCNC: 16 U/L (ref 13–39)
BASOPHILS # BLD AUTO: 0.03 THOUSANDS/ÂΜL (ref 0–0.1)
BASOPHILS NFR BLD AUTO: 1 % (ref 0–1)
BILIRUB SERPL-MCNC: 0.46 MG/DL (ref 0.2–1)
BUN SERPL-MCNC: 14 MG/DL (ref 5–25)
CALCIUM SERPL-MCNC: 9.6 MG/DL (ref 8.4–10.2)
CHLORIDE SERPL-SCNC: 106 MMOL/L (ref 96–108)
CHOLEST SERPL-MCNC: 205 MG/DL (ref ?–200)
CO2 SERPL-SCNC: 25 MMOL/L (ref 21–32)
CREAT SERPL-MCNC: 0.94 MG/DL (ref 0.6–1.3)
CREAT UR-MCNC: 287.9 MG/DL
EOSINOPHIL # BLD AUTO: 0.08 THOUSAND/ÂΜL (ref 0–0.61)
EOSINOPHIL NFR BLD AUTO: 2 % (ref 0–6)
ERYTHROCYTE [DISTWIDTH] IN BLOOD BY AUTOMATED COUNT: 12.2 % (ref 11.6–15.1)
GFR SERPL CREATININE-BSD FRML MDRD: 68 ML/MIN/1.73SQ M
GLUCOSE P FAST SERPL-MCNC: 87 MG/DL (ref 65–99)
HCT VFR BLD AUTO: 37.2 % (ref 34.8–46.1)
HDLC SERPL-MCNC: 69 MG/DL
HGB BLD-MCNC: 12.6 G/DL (ref 11.5–15.4)
IMM GRANULOCYTES # BLD AUTO: 0.01 THOUSAND/UL (ref 0–0.2)
IMM GRANULOCYTES NFR BLD AUTO: 0 % (ref 0–2)
LDLC SERPL CALC-MCNC: 120 MG/DL (ref 0–100)
LYMPHOCYTES # BLD AUTO: 2.31 THOUSANDS/ÂΜL (ref 0.6–4.47)
LYMPHOCYTES NFR BLD AUTO: 49 % (ref 14–44)
MCH RBC QN AUTO: 31.2 PG (ref 26.8–34.3)
MCHC RBC AUTO-ENTMCNC: 33.9 G/DL (ref 31.4–37.4)
MCV RBC AUTO: 92 FL (ref 82–98)
MICROALBUMIN UR-MCNC: 51.2 MG/L
MICROALBUMIN/CREAT 24H UR: 18 MG/G CREATININE (ref 0–30)
MONOCYTES # BLD AUTO: 0.33 THOUSAND/ÂΜL (ref 0.17–1.22)
MONOCYTES NFR BLD AUTO: 7 % (ref 4–12)
NEUTROPHILS # BLD AUTO: 1.93 THOUSANDS/ÂΜL (ref 1.85–7.62)
NEUTS SEG NFR BLD AUTO: 41 % (ref 43–75)
NRBC BLD AUTO-RTO: 0 /100 WBCS
PLATELET # BLD AUTO: 156 THOUSANDS/UL (ref 149–390)
PMV BLD AUTO: 9.4 FL (ref 8.9–12.7)
POTASSIUM SERPL-SCNC: 3.8 MMOL/L (ref 3.5–5.3)
PROT SERPL-MCNC: 7 G/DL (ref 6.4–8.4)
RBC # BLD AUTO: 4.04 MILLION/UL (ref 3.81–5.12)
SODIUM SERPL-SCNC: 139 MMOL/L (ref 135–147)
TRIGL SERPL-MCNC: 79 MG/DL (ref ?–150)
WBC # BLD AUTO: 4.69 THOUSAND/UL (ref 4.31–10.16)

## 2025-03-28 PROCEDURE — 82043 UR ALBUMIN QUANTITATIVE: CPT

## 2025-03-28 PROCEDURE — 80061 LIPID PANEL: CPT

## 2025-03-28 PROCEDURE — 80053 COMPREHEN METABOLIC PANEL: CPT

## 2025-03-28 PROCEDURE — 85025 COMPLETE CBC W/AUTO DIFF WBC: CPT

## 2025-03-28 PROCEDURE — 36415 COLL VENOUS BLD VENIPUNCTURE: CPT

## 2025-03-28 PROCEDURE — 82570 ASSAY OF URINE CREATININE: CPT

## 2025-03-30 ENCOUNTER — RESULTS FOLLOW-UP (OUTPATIENT)
Dept: FAMILY MEDICINE CLINIC | Facility: CLINIC | Age: 55
End: 2025-03-30

## 2025-06-13 DIAGNOSIS — G47.00 INSOMNIA, UNSPECIFIED TYPE: ICD-10-CM

## 2025-06-13 RX ORDER — ZOLPIDEM TARTRATE 5 MG/1
5 TABLET ORAL
Qty: 30 TABLET | Refills: 0 | Status: SHIPPED | OUTPATIENT
Start: 2025-06-13

## 2025-06-23 DIAGNOSIS — F41.9 ANXIETY: ICD-10-CM

## 2025-06-23 DIAGNOSIS — M54.9 BACK PAIN, UNSPECIFIED BACK LOCATION, UNSPECIFIED BACK PAIN LATERALITY, UNSPECIFIED CHRONICITY: Primary | ICD-10-CM

## 2025-06-23 RX ORDER — CYCLOBENZAPRINE HCL 5 MG
5 TABLET ORAL 3 TIMES DAILY PRN
Qty: 30 TABLET | Refills: 0 | Status: SHIPPED | OUTPATIENT
Start: 2025-06-23

## 2025-06-23 RX ORDER — LORAZEPAM 0.5 MG/1
0.5 TABLET ORAL DAILY PRN
Qty: 15 TABLET | Refills: 0 | Status: SHIPPED | OUTPATIENT
Start: 2025-06-23

## 2025-08-02 DIAGNOSIS — G47.00 INSOMNIA, UNSPECIFIED TYPE: ICD-10-CM

## 2025-08-02 DIAGNOSIS — F41.9 ANXIETY: ICD-10-CM

## 2025-08-05 RX ORDER — LORAZEPAM 0.5 MG/1
TABLET ORAL
Qty: 15 TABLET | Refills: 0 | Status: SHIPPED | OUTPATIENT
Start: 2025-08-05

## 2025-08-05 RX ORDER — ZOLPIDEM TARTRATE 5 MG/1
5 TABLET ORAL
Qty: 30 TABLET | Refills: 0 | Status: SHIPPED | OUTPATIENT
Start: 2025-08-05

## (undated) DEVICE — STRETCH BANDAGE: Brand: CURITY

## (undated) DEVICE — DISPOSABLE OR TOWEL: Brand: CARDINAL HEALTH

## (undated) DEVICE — THIN OFFSET (9.0 X 0.38 X 25.0MM)

## (undated) DEVICE — SUT VICRYL 4-0 PS-2 27 IN J426H

## (undated) DEVICE — STERILE POLYISOPRENE POWDER-FREE SURGICAL GLOVES: Brand: PROTEXIS

## (undated) DEVICE — SYRINGE 10ML LL

## (undated) DEVICE — NEEDLE BLUNT 18 G X 1 1/2IN

## (undated) DEVICE — KERLIX BANDAGE ROLL: Brand: KERLIX

## (undated) DEVICE — BETHLEHEM UNIVERSAL  MIONR EXT: Brand: CARDINAL HEALTH

## (undated) DEVICE — 3M™ STERI-STRIP™ REINFORCED ADHESIVE SKIN CLOSURES, R1546, 1/4 IN X 4 IN (6 MM X 100 MM), 10 STRIPS/ENVELOPE: Brand: 3M™ STERI-STRIP™

## (undated) DEVICE — INTENDED FOR TISSUE SEPARATION, AND OTHER PROCEDURES THAT REQUIRE A SHARP SURGICAL BLADE TO PUNCTURE OR CUT.: Brand: BARD-PARKER ® SAFETYLOCK CARBON RIB-BACK BLADES

## (undated) DEVICE — CHLORAPREP HI-LITE 26ML ORANGE

## (undated) DEVICE — CURITY NON-ADHERENT STRIPS: Brand: CURITY

## (undated) DEVICE — ACE WRAP 4 IN UNSTERILE

## (undated) DEVICE — GLOVE SRG LF STRL BGL SKNSNS 7.5 PF

## (undated) DEVICE — STOCKINETTE 2P PREROLLD 6X60

## (undated) DEVICE — 40529 DERMAPROX PAD 11'' X 15'' X 1'': Brand: 40529 DERMAPROX PAD 11'' X 15'' X 1''

## (undated) DEVICE — CAST PADDING 4 IN SYNTHETIC NON-STRL

## (undated) DEVICE — POV-IOD SOLUTION 4OZ BT

## (undated) DEVICE — PENCIL ELECTROSURG E-Z CLEAN -0035H

## (undated) DEVICE — SUT VICRYL 3-0 PS-2 18 IN J497G

## (undated) DEVICE — SCD SEQUENTIAL COMPRESSION COMFORT SLEEVE MEDIUM KNEE LENGTH: Brand: KENDALL SCD

## (undated) DEVICE — GLOVE PI ULTRA TOUCH SZ.7.5

## (undated) DEVICE — STANDARD SURGICAL GOWN, L: Brand: CONVERTORS

## (undated) DEVICE — STERILE POLYISOPRENE POWDER-FREE SURGICAL GLOVES WITH EMOLLIENT COATING: Brand: PROTEXIS

## (undated) DEVICE — GLOVE INDICATOR PI UNDERGLOVE SZ 8 BLUE

## (undated) DEVICE — GLOVE INDICATOR PI UNDERGLOVE SZ 7.5 BLUE

## (undated) DEVICE — SUT MONOCRYL 4-0 PS-2 27 IN Y426H

## (undated) DEVICE — SURGICAL GOWN, XL SMARTSLEEVE: Brand: CONVERTORS

## (undated) DEVICE — GLOVE INDICATOR PI UNDERGLOVE SZ 7 BLUE

## (undated) DEVICE — NEEDLE 25G X 1 1/2

## (undated) DEVICE — CUFF TOURNIQUET 18 X 4 IN QUICK CONNECT DISP 1 BLADDER

## (undated) DEVICE — GOWN,SLEEVE,STERILE,W/CSR WRAP,1/P: Brand: MEDLINE

## (undated) DEVICE — SINGLE PORT MANIFOLD: Brand: NEPTUNE 2

## (undated) DEVICE — GUIDEWIRE 0.9X100MM SGL TROCAR
Type: IMPLANTABLE DEVICE | Site: FOOT | Status: NON-FUNCTIONAL
Brand: VILEX GUIDEWIRE
Removed: 2023-02-17